# Patient Record
Sex: MALE | Race: WHITE | Employment: OTHER | URBAN - METROPOLITAN AREA
[De-identification: names, ages, dates, MRNs, and addresses within clinical notes are randomized per-mention and may not be internally consistent; named-entity substitution may affect disease eponyms.]

---

## 2018-03-26 ENCOUNTER — HOSPITAL ENCOUNTER (INPATIENT)
Age: 63
LOS: 2 days | Discharge: HOME | DRG: 432 | End: 2018-03-28
Attending: EMERGENCY MEDICINE | Admitting: INTERNAL MEDICINE
Payer: MEDICARE

## 2018-03-26 DIAGNOSIS — K29.21 GASTROINTESTINAL HEMORRHAGE ASSOCIATED WITH ALCOHOLIC GASTRITIS: Primary | ICD-10-CM

## 2018-03-26 DIAGNOSIS — Z87.19 HISTORY OF ESOPHAGEAL VARICES: ICD-10-CM

## 2018-03-26 PROBLEM — K92.2 GI BLEED: Status: ACTIVE | Noted: 2018-03-26

## 2018-03-26 LAB
ABO/RH: NORMAL
ALBUMIN SERPL-MCNC: 4.4 G/DL (ref 3.5–5.2)
ALP BLD-CCNC: 72 U/L (ref 40–129)
ALT SERPL-CCNC: 48 U/L (ref 0–40)
ANION GAP SERPL CALCULATED.3IONS-SCNC: 14 MMOL/L (ref 7–16)
ANTIBODY SCREEN: NORMAL
APTT: 32 SEC (ref 24.5–35.1)
AST SERPL-CCNC: 55 U/L (ref 0–39)
BASOPHILS ABSOLUTE: 0.02 E9/L (ref 0–0.2)
BASOPHILS RELATIVE PERCENT: 0.3 % (ref 0–2)
BILIRUB SERPL-MCNC: 1.6 MG/DL (ref 0–1.2)
BUN BLDV-MCNC: 35 MG/DL (ref 8–23)
CALCIUM SERPL-MCNC: 9.9 MG/DL (ref 8.6–10.2)
CHLORIDE BLD-SCNC: 101 MMOL/L (ref 98–107)
CO2: 24 MMOL/L (ref 22–29)
CREAT SERPL-MCNC: 0.8 MG/DL (ref 0.7–1.2)
EKG ATRIAL RATE: 124 BPM
EKG P AXIS: 68 DEGREES
EKG P-R INTERVAL: 144 MS
EKG Q-T INTERVAL: 306 MS
EKG QRS DURATION: 72 MS
EKG QTC CALCULATION (BAZETT): 439 MS
EKG R AXIS: 50 DEGREES
EKG T AXIS: 74 DEGREES
EKG VENTRICULAR RATE: 124 BPM
EOSINOPHILS ABSOLUTE: 0.02 E9/L (ref 0.05–0.5)
EOSINOPHILS RELATIVE PERCENT: 0.3 % (ref 0–6)
GFR AFRICAN AMERICAN: >60
GFR NON-AFRICAN AMERICAN: >60 ML/MIN/1.73
GLUCOSE BLD-MCNC: 104 MG/DL (ref 74–109)
HCT VFR BLD CALC: 39.6 % (ref 37–54)
HEMOGLOBIN: 13 G/DL (ref 12.5–16.5)
IMMATURE GRANULOCYTES #: 0.03 E9/L
IMMATURE GRANULOCYTES %: 0.4 % (ref 0–5)
INR BLD: 1.1
LACTIC ACID: 1.2 MMOL/L (ref 0.5–2.2)
LIPASE: 34 U/L (ref 13–60)
LYMPHOCYTES ABSOLUTE: 0.75 E9/L (ref 1.5–4)
LYMPHOCYTES RELATIVE PERCENT: 10.1 % (ref 20–42)
MCH RBC QN AUTO: 31.6 PG (ref 26–35)
MCHC RBC AUTO-ENTMCNC: 32.8 % (ref 32–34.5)
MCV RBC AUTO: 96.4 FL (ref 80–99.9)
MONOCYTES ABSOLUTE: 0.64 E9/L (ref 0.1–0.95)
MONOCYTES RELATIVE PERCENT: 8.6 % (ref 2–12)
NEUTROPHILS ABSOLUTE: 6 E9/L (ref 1.8–7.3)
NEUTROPHILS RELATIVE PERCENT: 80.3 % (ref 43–80)
PDW BLD-RTO: 14.2 FL (ref 11.5–15)
PLATELET # BLD: 147 E9/L (ref 130–450)
PMV BLD AUTO: 10.6 FL (ref 7–12)
POTASSIUM SERPL-SCNC: 4.2 MMOL/L (ref 3.5–5)
PRO-BNP: 128 PG/ML (ref 0–125)
PROTHROMBIN TIME: 13 SEC (ref 9.3–12.4)
RBC # BLD: 4.11 E12/L (ref 3.8–5.8)
SODIUM BLD-SCNC: 139 MMOL/L (ref 132–146)
TOTAL PROTEIN: 8.1 G/DL (ref 6.4–8.3)
TROPONIN: <0.01 NG/ML (ref 0–0.03)
WBC # BLD: 7.5 E9/L (ref 4.5–11.5)

## 2018-03-26 PROCEDURE — 80053 COMPREHEN METABOLIC PANEL: CPT

## 2018-03-26 PROCEDURE — 83690 ASSAY OF LIPASE: CPT

## 2018-03-26 PROCEDURE — 85025 COMPLETE CBC W/AUTO DIFF WBC: CPT

## 2018-03-26 PROCEDURE — C9113 INJ PANTOPRAZOLE SODIUM, VIA: HCPCS | Performed by: INTERNAL MEDICINE

## 2018-03-26 PROCEDURE — 2580000003 HC RX 258: Performed by: INTERNAL MEDICINE

## 2018-03-26 PROCEDURE — 84484 ASSAY OF TROPONIN QUANT: CPT

## 2018-03-26 PROCEDURE — 83880 ASSAY OF NATRIURETIC PEPTIDE: CPT

## 2018-03-26 PROCEDURE — 86901 BLOOD TYPING SEROLOGIC RH(D): CPT

## 2018-03-26 PROCEDURE — 93005 ELECTROCARDIOGRAM TRACING: CPT | Performed by: PHYSICIAN ASSISTANT

## 2018-03-26 PROCEDURE — 6360000002 HC RX W HCPCS: Performed by: EMERGENCY MEDICINE

## 2018-03-26 PROCEDURE — 6360000002 HC RX W HCPCS: Performed by: INTERNAL MEDICINE

## 2018-03-26 PROCEDURE — 2580000003 HC RX 258: Performed by: EMERGENCY MEDICINE

## 2018-03-26 PROCEDURE — 86850 RBC ANTIBODY SCREEN: CPT

## 2018-03-26 PROCEDURE — 99285 EMERGENCY DEPT VISIT HI MDM: CPT

## 2018-03-26 PROCEDURE — 2060000000 HC ICU INTERMEDIATE R&B

## 2018-03-26 PROCEDURE — 85730 THROMBOPLASTIN TIME PARTIAL: CPT

## 2018-03-26 PROCEDURE — C9113 INJ PANTOPRAZOLE SODIUM, VIA: HCPCS | Performed by: EMERGENCY MEDICINE

## 2018-03-26 PROCEDURE — 83605 ASSAY OF LACTIC ACID: CPT

## 2018-03-26 PROCEDURE — 86900 BLOOD TYPING SEROLOGIC ABO: CPT

## 2018-03-26 PROCEDURE — 2500000003 HC RX 250 WO HCPCS: Performed by: INTERNAL MEDICINE

## 2018-03-26 PROCEDURE — 85610 PROTHROMBIN TIME: CPT

## 2018-03-26 PROCEDURE — 96375 TX/PRO/DX INJ NEW DRUG ADDON: CPT

## 2018-03-26 PROCEDURE — 96374 THER/PROPH/DIAG INJ IV PUSH: CPT

## 2018-03-26 RX ORDER — LORAZEPAM 2 MG/ML
4 INJECTION INTRAMUSCULAR
Status: DISCONTINUED | OUTPATIENT
Start: 2018-03-26 | End: 2018-03-28 | Stop reason: HOSPADM

## 2018-03-26 RX ORDER — ONDANSETRON 2 MG/ML
4 INJECTION INTRAMUSCULAR; INTRAVENOUS ONCE
Status: COMPLETED | OUTPATIENT
Start: 2018-03-26 | End: 2018-03-26

## 2018-03-26 RX ORDER — NADOLOL 20 MG/1
40 TABLET ORAL DAILY
Status: DISCONTINUED | OUTPATIENT
Start: 2018-03-26 | End: 2018-03-28 | Stop reason: HOSPADM

## 2018-03-26 RX ORDER — LORAZEPAM 2 MG/ML
1 INJECTION INTRAMUSCULAR
Status: DISCONTINUED | OUTPATIENT
Start: 2018-03-26 | End: 2018-03-28 | Stop reason: HOSPADM

## 2018-03-26 RX ORDER — LORAZEPAM 1 MG/1
3 TABLET ORAL
Status: DISCONTINUED | OUTPATIENT
Start: 2018-03-26 | End: 2018-03-28 | Stop reason: HOSPADM

## 2018-03-26 RX ORDER — SODIUM CHLORIDE 0.9 % (FLUSH) 0.9 %
10 SYRINGE (ML) INJECTION PRN
Status: DISCONTINUED | OUTPATIENT
Start: 2018-03-26 | End: 2018-03-26 | Stop reason: SDUPTHER

## 2018-03-26 RX ORDER — LORAZEPAM 1 MG/1
1 TABLET ORAL
Status: DISCONTINUED | OUTPATIENT
Start: 2018-03-26 | End: 2018-03-28 | Stop reason: HOSPADM

## 2018-03-26 RX ORDER — LORAZEPAM 2 MG/ML
2 INJECTION INTRAMUSCULAR
Status: DISCONTINUED | OUTPATIENT
Start: 2018-03-26 | End: 2018-03-28 | Stop reason: HOSPADM

## 2018-03-26 RX ORDER — SODIUM CHLORIDE 0.9 % (FLUSH) 0.9 %
10 SYRINGE (ML) INJECTION EVERY 12 HOURS SCHEDULED
Status: DISCONTINUED | OUTPATIENT
Start: 2018-03-26 | End: 2018-03-28 | Stop reason: HOSPADM

## 2018-03-26 RX ORDER — SODIUM CHLORIDE 0.9 % (FLUSH) 0.9 %
10 SYRINGE (ML) INJECTION PRN
Status: DISCONTINUED | OUTPATIENT
Start: 2018-03-26 | End: 2018-03-28 | Stop reason: HOSPADM

## 2018-03-26 RX ORDER — LORAZEPAM 1 MG/1
4 TABLET ORAL
Status: DISCONTINUED | OUTPATIENT
Start: 2018-03-26 | End: 2018-03-28 | Stop reason: HOSPADM

## 2018-03-26 RX ORDER — SODIUM CHLORIDE 0.9 % (FLUSH) 0.9 %
10 SYRINGE (ML) INJECTION EVERY 12 HOURS SCHEDULED
Status: DISCONTINUED | OUTPATIENT
Start: 2018-03-26 | End: 2018-03-26 | Stop reason: SDUPTHER

## 2018-03-26 RX ORDER — LORAZEPAM 2 MG/ML
3 INJECTION INTRAMUSCULAR
Status: DISCONTINUED | OUTPATIENT
Start: 2018-03-26 | End: 2018-03-28 | Stop reason: HOSPADM

## 2018-03-26 RX ORDER — SUCRALFATE 1 G/1
1 TABLET ORAL
Status: DISCONTINUED | OUTPATIENT
Start: 2018-03-26 | End: 2018-03-26

## 2018-03-26 RX ORDER — ACETAMINOPHEN 325 MG/1
650 TABLET ORAL EVERY 4 HOURS PRN
Status: DISCONTINUED | OUTPATIENT
Start: 2018-03-26 | End: 2018-03-28 | Stop reason: HOSPADM

## 2018-03-26 RX ORDER — LORAZEPAM 1 MG/1
2 TABLET ORAL
Status: DISCONTINUED | OUTPATIENT
Start: 2018-03-26 | End: 2018-03-28 | Stop reason: HOSPADM

## 2018-03-26 RX ORDER — NADOLOL 40 MG/1
40 TABLET ORAL DAILY
Status: DISCONTINUED | OUTPATIENT
Start: 2018-03-26 | End: 2018-03-26 | Stop reason: SDUPTHER

## 2018-03-26 RX ORDER — 0.9 % SODIUM CHLORIDE 0.9 %
1000 INTRAVENOUS SOLUTION INTRAVENOUS ONCE
Status: COMPLETED | OUTPATIENT
Start: 2018-03-26 | End: 2018-03-26

## 2018-03-26 RX ORDER — ACETAMINOPHEN 325 MG/1
650 TABLET ORAL EVERY 4 HOURS PRN
Status: DISCONTINUED | OUTPATIENT
Start: 2018-03-26 | End: 2018-03-26 | Stop reason: SDUPTHER

## 2018-03-26 RX ORDER — ONDANSETRON 2 MG/ML
4 INJECTION INTRAMUSCULAR; INTRAVENOUS EVERY 6 HOURS PRN
Status: DISCONTINUED | OUTPATIENT
Start: 2018-03-26 | End: 2018-03-28 | Stop reason: HOSPADM

## 2018-03-26 RX ORDER — SODIUM CHLORIDE 9 MG/ML
INJECTION, SOLUTION INTRAVENOUS CONTINUOUS
Status: DISCONTINUED | OUTPATIENT
Start: 2018-03-26 | End: 2018-03-27

## 2018-03-26 RX ORDER — PANTOPRAZOLE SODIUM 40 MG/10ML
40 INJECTION, POWDER, LYOPHILIZED, FOR SOLUTION INTRAVENOUS ONCE
Status: COMPLETED | OUTPATIENT
Start: 2018-03-26 | End: 2018-03-26

## 2018-03-26 RX ADMIN — PANTOPRAZOLE SODIUM 40 MG: 40 INJECTION, POWDER, FOR SOLUTION INTRAVENOUS at 16:58

## 2018-03-26 RX ADMIN — SODIUM CHLORIDE 8 MG/HR: 9 INJECTION, SOLUTION INTRAVENOUS at 20:46

## 2018-03-26 RX ADMIN — OCTREOTIDE ACETATE 50 MCG/HR: 500 INJECTION, SOLUTION INTRAVENOUS; SUBCUTANEOUS at 20:47

## 2018-03-26 RX ADMIN — Medication 10 ML: at 21:21

## 2018-03-26 RX ADMIN — ONDANSETRON 4 MG: 2 INJECTION INTRAMUSCULAR; INTRAVENOUS at 16:55

## 2018-03-26 RX ADMIN — Medication 10 ML: at 21:22

## 2018-03-26 RX ADMIN — FOLIC ACID: 5 INJECTION, SOLUTION INTRAMUSCULAR; INTRAVENOUS; SUBCUTANEOUS at 21:21

## 2018-03-26 RX ADMIN — SODIUM CHLORIDE 1000 ML: 9 INJECTION, SOLUTION INTRAVENOUS at 16:54

## 2018-03-26 RX ADMIN — SODIUM CHLORIDE: 9 INJECTION, SOLUTION INTRAVENOUS at 20:47

## 2018-03-26 ASSESSMENT — PAIN SCALES - GENERAL
PAINLEVEL_OUTOF10: 0
PAINLEVEL_OUTOF10: 0

## 2018-03-26 NOTE — ED NOTES
Pt taken back to room 18, back called regarding patient status and high heart rate/diaphoretic/clammy     Rosibel Dalton RN  03/26/18 3619

## 2018-03-26 NOTE — ED PROVIDER NOTES
Department of Emergency Medicine   ED  Provider Note  Admit Date/RoomTime: 3/26/2018  3:00 PM  ED Room: 18/18      History of Present Illness:  3/26/18, Time: 4:50 PM        Tran Kamara is a 61 y.o. male presenting to the ED for hematemesis, beginning around 1:30 am today. The complaint has been persistent, moderate in severity, and worsened by nothing. Pt reports that he woke up with hematemesis and melena. Reports that he has a hx of esophageal varices and had them banded 1 year ago. Reports that his varices were due to heavy drinking. Reports that he had stopped drinking alcohol after procedure but started again in the past 2 months. Reports that he follows up with Dr. Angelica Calle for GI. Reports that he was previously on Prilosec and Carafate while he was incarcerated but since he was released, he has not been taking any kind of acid blocker. Denies chest pain, shortness of breath, fever, chills, abdominal pain, constipation, and further complaints at this time. Review of Systems:   Pertinent positives and negatives are stated within HPI, all other systems reviewed and are negative.    --------------------------------------------- PAST HISTORY ---------------------------------------------  Past Medical History:  has a past medical history of Cirrhosis ([facility] [address] Esophageal varices ([facility] GI bleed; and Hepatitis C. Past Surgical History:  has a past surgical history that includes Leg Surgery; Endoscopy, colon, diagnostic (4/30/2013); and Upper gastrointestinal endoscopy (09/02/2016). Social History:  reports that he has never smoked. He has never used smokeless tobacco. He reports that he drinks alcohol. He reports that he does not use drugs. Family History: family history is not on file. The patients home medications have been reviewed. Allergies: Patient has no known allergies.     ---------------------------------------------------PHYSICAL EXAM--------------------------------------    (Vital signs reviewed)  Constitutional: He appears well-developed and well-nourished. No acute distress. Head: Normocephalic and atraumatic. Eyes: Conjunctivae are normal. PERRL. HENT: Mucous membranes are moist.  Neck: Supple. Cardiovascular: Regular rate. Regular rhythm. No murmurs, gallops, or rubs. 2+ distal pulses. Distal pulses intact. Pulmonary/Chest: Lungs clear to auscultation bilaterally, no wheezes, rales, or rhonchi. Not in respiratory distress  Abdominal: Abdomen Soft, Non tender, Non distended. +BS. No rebound, guarding, or rigidity. No pulsatile masses  Rectal:  No tenderness. No masses. No hemorrhoids. Normal sphincter tone. Stool is dark, no definite melena; guaiac positive. Musculoskeletal: No edema. Skin: Warm and dry. Neurological: He is alert and oriented x3. CN II-XII intact. No focal deficits. No meningeal signs. Psychiatric: Normal affect. No signs or symptoms of depression or psychosis.     -------------------------------------------------- RESULTS -------------------------------------------------  I have personally reviewed all laboratory and imaging results for this patient. Results are listed below.      LABS:  Results for orders placed or performed during the hospital encounter of 03/26/18   CBC Auto Differential   Result Value Ref Range    WBC 7.5 4.5 - 11.5 E9/L    RBC 4.11 3.80 - 5.80 E12/L    Hemoglobin 13.0 12.5 - 16.5 g/dL    Hematocrit 39.6 37.0 - 54.0 %    MCV 96.4 80.0 - 99.9 fL    MCH 31.6 26.0 - 35.0 pg    MCHC 32.8 32.0 - 34.5 %    RDW 14.2 11.5 - 15.0 fL    Platelets 322 541 - 740 E9/L    MPV 10.6 7.0 - 12.0 fL    Neutrophils % 80.3 (H) 43.0 - 80.0 %    Immature Granulocytes % 0.4 0.0 - 5.0 %    Lymphocytes % 10.1 (L) 20.0 - 42.0 %    Monocytes % 8.6 2.0 - 12.0 %    Eosinophils % 0.3 0.0 - 6.0 %    Basophils % 0.3 0.0 - 2.0 %    Neutrophils # 6.00 1.80 - 7.30 E9/L    Immature Granulocytes # 0.03 E9/L    Lymphocytes # 0.75 (L) 1.50 - 4.00 E9/L    Monocytes # reviewed by me in its entirety. I confirm that the note above accurately reflects all work, treatment, procedures, and medical decision making performed by me.                  Alex Ross MD  03/26/18 6564

## 2018-03-26 NOTE — H&P
[facility] Hospitalist Group History and Physical      CHIEF COMPLAINT:  Hematemesis, melana    History of Present Illness: This is a 61year old with a history of esophageal varices and previous banding in November of last year. He stated sometime yesterday he began to vomit bright red blood and he noticed dark stools at the same time. He has not been able to eat anything since. Early this morning he went 5 times to the bathroom within 1 hour. He admitted to starting alcohol again and he drinks now 8 beers a day from last November. He denies any other complaints. No other aggravating or alleviating factors. REVIEW OF SYSTEMS:  no fevers, chills, cp, sob, ha, vision/hearing changes, wt changes, hot/cold flashes, other open skin lesions, diarrhea, constipation, dysuria/hematuria unless noted in HPI. Complete ROS performed with the patient and is otherwise negative. PMH:  Past Medical History:   Diagnosis Date    Cirrhosis ([address]     Esophageal varices (HCC)     GI bleed     UPPER- 15 MONTHS AGO    Hepatitis C        Surgical History:  Past Surgical History:   Procedure Laterality Date    ENDOSCOPY, COLON, DIAGNOSTIC  4/30/2013    LEG SURGERY      right leg    UPPER GASTROINTESTINAL ENDOSCOPY  09/02/2016    Nevarez, nonbleeding esophageal varicies and gastritis       Medications Prior to Admission:    Prior to Admission medications    Medication Sig Start Date End Date Taking?  Authorizing Provider   pantoprazole (PROTONIX) 40 MG tablet Take 1 tablet by mouth daily  Patient taking differently: Take 40 mg by mouth daily as needed  10/19/16  Yes Ashlee Vasquez MD   nadolol (CORGARD) 40 MG tablet Take 1 tablet by mouth daily 10/19/16   Ashlee Vasquez MD   ferrous sulfate (FE TABS) 325 (65 FE) MG EC tablet Take 1 tablet by mouth 2 times daily 10/19/16   Ashlee Vasquez MD   sucralfate (CARAFATE) 1 GM tablet Take 1 tablet by mouth 4 times daily 10/19/16   Ashlee Vasquez MD Allergies:    Patient has no known allergies. Social History:    reports that he has never smoked. He has never used smokeless tobacco. He reports that he drinks alcohol. He reports that he does not use drugs. Family History:   family history is not on file. PHYSICAL EXAM:  Vitals:  BP (!) 141/89   Pulse 114   Temp 98.4 °F (36.9 °C)   Resp 20   Ht 5' 9\" (1.753 m)   Wt 189 lb (85.7 kg)   SpO2 98%   BMI 27.91 kg/m²   General Appearance: alert and oriented to person, place and time  Skin: warm and dry, no rash or erythema  Head: normocephalic and atraumatic  Eyes: extraocular eye movements intact  Neck: neck supple and non tender without mass, no thyromegaly or thyroid nodules, no cervical lymphadenopathy   Pulmonary/Chest: clear to auscultation bilaterally- no wheezes, rales or rhonchi, normal air movement, no respiratory distress  Cardiovascular: normal rate and normal S1 and S2  Abdomen: soft, non-tender, non-distended, normal bowel sounds, no masses or organomegaly  Extremities: no cyanosis and no clubbing  Musculoskeletal: normal range of motion, no joint swelling, deformity or tenderness      LABS:  Recent Labs      03/26/18   1516   NA  139   K  4.2   CL  101   CO2  24   BUN  35*   CREATININE  0.8   GLUCOSE  104   CALCIUM  9.9       Recent Labs      03/26/18   1516   WBC  7.5   RBC  4.11   HGB  13.0   HCT  39.6   MCV  96.4   MCH  31.6   MCHC  32.8   RDW  14.2   PLT  147   MPV  10.6       No results for input(s): POCGLU in the last 72 hours.     CBC:   Lab Results   Component Value Date    WBC 7.5 03/26/2018    RBC 4.11 03/26/2018    RBC  09/01/2016      RBC           [id number]    transfused   09/02/16  00:19  SCC    RBC  09/01/2016      RBC           [id number]    transfused   09/02/16  19:47  SCC    HGB 13.0 03/26/2018    HCT 39.6 03/26/2018    MCV 96.4 03/26/2018    MCH 31.6 03/26/2018    MCHC 32.8 03/26/2018    RDW 14.2 03/26/2018     03/26/2018    MPV 10.6

## 2018-03-26 NOTE — LETTER
[facility] Emergency Department  [address] [place] [address]  Phone: [phone]               March 26, 2018    Patient: Irma Herndon   YOB: 1955   Date of Visit: 3/26/2018       To Whom It May Concern:    Irma Herndon was seen in in our emergency department and admitted on 3/26/2018 to [facility].  Laila Orellana [address], [place]    [facility]  C:[phone]           Signature:__________________________________

## 2018-03-26 NOTE — CARE COORDINATION
Social Work/Transition of Care:    MATILDE met with pt who reported he is to be in court tomorrow and he is going to be admitted to the hospital, pt requested a letter be faxed to his  stating he was admitted. Per pt request SW faxed a letter of his admission to  [facility] at [phone]. MATILDE gave pt a copy of the faxed letter along with his confirmation.     Electronically signed by Holley Lawler on 2/61/9520 at 6:43 PM

## 2018-03-27 ENCOUNTER — ANESTHESIA (OUTPATIENT)
Dept: ENDOSCOPY | Age: 63
DRG: 432 | End: 2018-03-27
Payer: MEDICARE

## 2018-03-27 ENCOUNTER — ANESTHESIA EVENT (OUTPATIENT)
Dept: ENDOSCOPY | Age: 63
DRG: 432 | End: 2018-03-27
Payer: MEDICARE

## 2018-03-27 VITALS
DIASTOLIC BLOOD PRESSURE: 54 MMHG | OXYGEN SATURATION: 99 % | SYSTOLIC BLOOD PRESSURE: 82 MMHG | RESPIRATION RATE: 27 BRPM

## 2018-03-27 PROBLEM — I85.00 ESOPHAGEAL VARICES (HCC): Status: ACTIVE | Noted: 2018-03-27

## 2018-03-27 LAB
ALBUMIN SERPL-MCNC: 3.6 G/DL (ref 3.5–5.2)
ALP BLD-CCNC: 52 U/L (ref 40–129)
ALT SERPL-CCNC: 52 U/L (ref 0–40)
AMMONIA: 46.7 UMOL/L (ref 16–60)
ANION GAP SERPL CALCULATED.3IONS-SCNC: 11 MMOL/L (ref 7–16)
AST SERPL-CCNC: 69 U/L (ref 0–39)
BASOPHILS ABSOLUTE: 0.01 E9/L (ref 0–0.2)
BASOPHILS RELATIVE PERCENT: 0.3 % (ref 0–2)
BILIRUB SERPL-MCNC: 2.5 MG/DL (ref 0–1.2)
BUN BLDV-MCNC: 31 MG/DL (ref 8–23)
CALCIUM SERPL-MCNC: 9 MG/DL (ref 8.6–10.2)
CHLORIDE BLD-SCNC: 107 MMOL/L (ref 98–107)
CO2: 22 MMOL/L (ref 22–29)
CREAT SERPL-MCNC: 0.9 MG/DL (ref 0.7–1.2)
EOSINOPHILS ABSOLUTE: 0.02 E9/L (ref 0.05–0.5)
EOSINOPHILS RELATIVE PERCENT: 0.7 % (ref 0–6)
GFR AFRICAN AMERICAN: >60
GFR NON-AFRICAN AMERICAN: >60 ML/MIN/1.73
GLUCOSE BLD-MCNC: 118 MG/DL (ref 74–109)
HCT VFR BLD CALC: 28.1 % (ref 37–54)
HCT VFR BLD CALC: 30.7 % (ref 37–54)
HCT VFR BLD CALC: 30.9 % (ref 37–54)
HCT VFR BLD CALC: 31.1 % (ref 37–54)
HEMOGLOBIN: 10 G/DL (ref 12.5–16.5)
HEMOGLOBIN: 9.1 G/DL (ref 12.5–16.5)
HEMOGLOBIN: 9.8 G/DL (ref 12.5–16.5)
HEMOGLOBIN: 9.9 G/DL (ref 12.5–16.5)
IMMATURE GRANULOCYTES #: 0.01 E9/L
IMMATURE GRANULOCYTES %: 0.3 % (ref 0–5)
LYMPHOCYTES ABSOLUTE: 0.63 E9/L (ref 1.5–4)
LYMPHOCYTES RELATIVE PERCENT: 21 % (ref 20–42)
MAGNESIUM: 1.8 MG/DL (ref 1.6–2.6)
MCH RBC QN AUTO: 31.8 PG (ref 26–35)
MCHC RBC AUTO-ENTMCNC: 32 % (ref 32–34.5)
MCV RBC AUTO: 99.4 FL (ref 80–99.9)
MONOCYTES ABSOLUTE: 0.28 E9/L (ref 0.1–0.95)
MONOCYTES RELATIVE PERCENT: 9.3 % (ref 2–12)
NEUTROPHILS ABSOLUTE: 2.05 E9/L (ref 1.8–7.3)
NEUTROPHILS RELATIVE PERCENT: 68.4 % (ref 43–80)
PDW BLD-RTO: 14.2 FL (ref 11.5–15)
PLATELET # BLD: 78 E9/L (ref 130–450)
PLATELET CONFIRMATION: NORMAL
PMV BLD AUTO: 11 FL (ref 7–12)
POTASSIUM REFLEX MAGNESIUM: 4.1 MMOL/L (ref 3.5–5)
RBC # BLD: 3.11 E12/L (ref 3.8–5.8)
SODIUM BLD-SCNC: 140 MMOL/L (ref 132–146)
TOTAL PROTEIN: 6.6 G/DL (ref 6.4–8.3)
WBC # BLD: 3 E9/L (ref 4.5–11.5)

## 2018-03-27 PROCEDURE — 85018 HEMOGLOBIN: CPT

## 2018-03-27 PROCEDURE — 6360000002 HC RX W HCPCS: Performed by: NURSE ANESTHETIST, CERTIFIED REGISTERED

## 2018-03-27 PROCEDURE — 36415 COLL VENOUS BLD VENIPUNCTURE: CPT

## 2018-03-27 PROCEDURE — 2580000003 HC RX 258: Performed by: INTERNAL MEDICINE

## 2018-03-27 PROCEDURE — 85025 COMPLETE CBC W/AUTO DIFF WBC: CPT

## 2018-03-27 PROCEDURE — 80053 COMPREHEN METABOLIC PANEL: CPT

## 2018-03-27 PROCEDURE — 7100000011 HC PHASE II RECOVERY - ADDTL 15 MIN: Performed by: INTERNAL MEDICINE

## 2018-03-27 PROCEDURE — 6370000000 HC RX 637 (ALT 250 FOR IP): Performed by: INTERNAL MEDICINE

## 2018-03-27 PROCEDURE — 2500000003 HC RX 250 WO HCPCS: Performed by: INTERNAL MEDICINE

## 2018-03-27 PROCEDURE — 2500000003 HC RX 250 WO HCPCS: Performed by: NURSE ANESTHETIST, CERTIFIED REGISTERED

## 2018-03-27 PROCEDURE — 06L38CZ OCCLUSION OF ESOPHAGEAL VEIN WITH EXTRALUMINAL DEVICE, VIA NATURAL OR ARTIFICIAL OPENING ENDOSCOPIC: ICD-10-PCS | Performed by: INTERNAL MEDICINE

## 2018-03-27 PROCEDURE — 2580000003 HC RX 258: Performed by: NURSE ANESTHETIST, CERTIFIED REGISTERED

## 2018-03-27 PROCEDURE — 6370000000 HC RX 637 (ALT 250 FOR IP): Performed by: NURSE PRACTITIONER

## 2018-03-27 PROCEDURE — 6360000002 HC RX W HCPCS: Performed by: INTERNAL MEDICINE

## 2018-03-27 PROCEDURE — 2720000010 HC SURG SUPPLY STERILE: Performed by: INTERNAL MEDICINE

## 2018-03-27 PROCEDURE — 2060000000 HC ICU INTERMEDIATE R&B

## 2018-03-27 PROCEDURE — 83735 ASSAY OF MAGNESIUM: CPT

## 2018-03-27 PROCEDURE — 82140 ASSAY OF AMMONIA: CPT

## 2018-03-27 PROCEDURE — 3700000001 HC ADD 15 MINUTES (ANESTHESIA): Performed by: INTERNAL MEDICINE

## 2018-03-27 PROCEDURE — 3700000000 HC ANESTHESIA ATTENDED CARE: Performed by: INTERNAL MEDICINE

## 2018-03-27 PROCEDURE — 7100000010 HC PHASE II RECOVERY - FIRST 15 MIN: Performed by: INTERNAL MEDICINE

## 2018-03-27 PROCEDURE — 3609012300 HC EGD BAND LIGATION ESOPHGEAL/GASTRIC VARICES: Performed by: INTERNAL MEDICINE

## 2018-03-27 PROCEDURE — 85014 HEMATOCRIT: CPT

## 2018-03-27 PROCEDURE — C9113 INJ PANTOPRAZOLE SODIUM, VIA: HCPCS | Performed by: INTERNAL MEDICINE

## 2018-03-27 RX ORDER — SODIUM CHLORIDE 9 MG/ML
INJECTION, SOLUTION INTRAVENOUS CONTINUOUS PRN
Status: DISCONTINUED | OUTPATIENT
Start: 2018-03-27 | End: 2018-03-27 | Stop reason: SDUPTHER

## 2018-03-27 RX ORDER — FENTANYL CITRATE 50 UG/ML
INJECTION, SOLUTION INTRAMUSCULAR; INTRAVENOUS PRN
Status: DISCONTINUED | OUTPATIENT
Start: 2018-03-27 | End: 2018-03-27 | Stop reason: SDUPTHER

## 2018-03-27 RX ORDER — EPHEDRINE SULFATE 50 MG/ML
INJECTION INTRAVENOUS PRN
Status: DISCONTINUED | OUTPATIENT
Start: 2018-03-27 | End: 2018-03-27 | Stop reason: SDUPTHER

## 2018-03-27 RX ORDER — PANTOPRAZOLE SODIUM 40 MG/1
40 TABLET, DELAYED RELEASE ORAL
Status: DISCONTINUED | OUTPATIENT
Start: 2018-03-27 | End: 2018-03-27

## 2018-03-27 RX ORDER — PROPOFOL 10 MG/ML
INJECTION, EMULSION INTRAVENOUS PRN
Status: DISCONTINUED | OUTPATIENT
Start: 2018-03-27 | End: 2018-03-27 | Stop reason: SDUPTHER

## 2018-03-27 RX ORDER — LACTULOSE 10 G/15ML
20 SOLUTION ORAL 3 TIMES DAILY
Status: DISCONTINUED | OUTPATIENT
Start: 2018-03-27 | End: 2018-03-27

## 2018-03-27 RX ADMIN — SODIUM CHLORIDE: 9 INJECTION, SOLUTION INTRAVENOUS at 11:33

## 2018-03-27 RX ADMIN — Medication 10 ML: at 20:43

## 2018-03-27 RX ADMIN — FENTANYL CITRATE 100 MCG: 50 INJECTION, SOLUTION INTRAMUSCULAR; INTRAVENOUS at 11:31

## 2018-03-27 RX ADMIN — FENTANYL CITRATE 50 MCG: 50 INJECTION, SOLUTION INTRAMUSCULAR; INTRAVENOUS at 11:40

## 2018-03-27 RX ADMIN — LACTULOSE 20 G: 10 SOLUTION ORAL at 16:35

## 2018-03-27 RX ADMIN — PANTOPRAZOLE SODIUM 40 MG: 40 TABLET, DELAYED RELEASE ORAL at 15:52

## 2018-03-27 RX ADMIN — PROPOFOL 200 MG: 10 INJECTION, EMULSION INTRAVENOUS at 11:40

## 2018-03-27 RX ADMIN — NADOLOL 40 MG: 20 TABLET ORAL at 08:54

## 2018-03-27 RX ADMIN — FOLIC ACID: 5 INJECTION, SOLUTION INTRAMUSCULAR; INTRAVENOUS; SUBCUTANEOUS at 08:54

## 2018-03-27 RX ADMIN — EPHEDRINE SULFATE 15 MG: 50 INJECTION, SOLUTION INTRAVENOUS at 11:45

## 2018-03-27 RX ADMIN — OCTREOTIDE ACETATE 50 MCG/HR: 500 INJECTION, SOLUTION INTRAVENOUS; SUBCUTANEOUS at 06:39

## 2018-03-27 RX ADMIN — SODIUM CHLORIDE 8 MG/HR: 9 INJECTION, SOLUTION INTRAVENOUS at 06:39

## 2018-03-27 RX ADMIN — FENTANYL CITRATE 50 MCG: 50 INJECTION, SOLUTION INTRAMUSCULAR; INTRAVENOUS at 11:52

## 2018-03-27 ASSESSMENT — PAIN SCALES - GENERAL
PAINLEVEL_OUTOF10: 0

## 2018-03-27 ASSESSMENT — PAIN DESCRIPTION - LOCATION
LOCATION: ABDOMEN

## 2018-03-27 NOTE — CONSULTS
Dr. Francois Becca April 2016 at [facility], for esophageal banding. Patient lost to follow up post procedure. Patient reports to incarceration during this time. EGD with Dr. Terri Melton 9/2/16: Esophageal varices - non bleeding, Gastritis. Stomach biopsy- negative.  EGD with Dr. Jade Alcocer 10/17/16: Distal esophageal varices, 2 bands applied, Unremarkable stomach and duodenum    Past Medical History:        Diagnosis Date    Cirrhosis ([address]     Esophageal varices (HCC)     GI bleed     UPPER- 15 MONTHS AGO    Hepatitis C      Past Surgical History:        Procedure Laterality Date    ENDOSCOPY, COLON, DIAGNOSTIC  4/30/2013    LEG SURGERY      right leg    UPPER GASTROINTESTINAL ENDOSCOPY  09/02/2016    Mary Sprung, nonbleeding esophageal varicies and gastritis     Current Medications:    Current Facility-Administered Medications: pantoprazole (PROTONIX) 80 mg in sodium chloride 0.9 % 100 mL infusion, 8 mg/hr, Intravenous, Continuous  sodium chloride flush 0.9 % injection 10 mL, 10 mL, Intravenous, 2 times per day  sodium chloride flush 0.9 % injection 10 mL, 10 mL, Intravenous, PRN  acetaminophen (TYLENOL) tablet 650 mg, 650 mg, Oral, Q4H PRN  magnesium hydroxide (MILK OF MAGNESIA) 400 MG/5ML suspension 30 mL, 30 mL, Oral, Daily PRN  ondansetron (ZOFRAN) injection 4 mg, 4 mg, Intravenous, Q6H PRN  0.9 % sodium chloride infusion, , Intravenous, Continuous  nadolol (CORGARD) tablet 40 mg, 40 mg, Oral, Daily  octreotide (SANDOSTATIN) 500 mcg in sodium chloride 0.9 % 100 mL infusion, 50 mcg/hr, Intravenous, Continuous  sodium chloride flush 0.9 % injection 10 mL, 10 mL, Intravenous, 2 times per day  sodium chloride flush 0.9 % injection 10 mL, 10 mL, Intravenous, PRN  sodium chloride 0.9 % 7,842 mL with folic acid 1 mg, Multi-Vitamin with vitamin k 10 mL, thiamine 100 mg, , Intravenous, Daily  LORazepam (ATIVAN) tablet 1 mg, 1 mg, Oral, Q1H PRN **OR** LORazepam (ATIVAN) injection 1 mg, 1 mg, Intravenous, Q1H PRN **OR** LORazepam

## 2018-03-27 NOTE — ANESTHESIA PRE PROCEDURE
03/27/18 0854    octreotide (SANDOSTATIN) 500 mcg in sodium chloride 0.9 % 100 mL infusion  50 mcg/hr Intravenous Continuous Cherise Calhoun MD 10 mL/hr at 03/27/18 0639 50 mcg/hr at 03/27/18 0639    sodium chloride flush 0.9 % injection 10 mL  10 mL Intravenous 2 times per day Daniela Diaz MD   10 mL at 03/26/18 2121    sodium chloride flush 0.9 % injection 10 mL  10 mL Intravenous PRN Daniela Diaz MD        sodium chloride 0.9 % 7,429 mL with folic acid 1 mg, Multi-Vitamin with vitamin k 10 mL, thiamine 100 mg   Intravenous Daily Daniela Diaz  mL/hr at 03/27/18 0854      LORazepam (ATIVAN) tablet 1 mg  1 mg Oral Q1H PRN Daniela Diaz MD        Or    LORazepam (ATIVAN) injection 1 mg  1 mg Intravenous Q1H PRN Daniela Diaz MD        Or    LORazepam (ATIVAN) tablet 2 mg  2 mg Oral Q1H PRN Daniela Diaz MD        Or    LORazepam (ATIVAN) injection 2 mg  2 mg Intravenous Q1H PRN Daniela Diaz MD        Or    LORazepam (ATIVAN) tablet 3 mg  3 mg Oral Q1H PRN Daniela Diaz MD        Or    LORazepam (ATIVAN) injection 3 mg  3 mg Intravenous Q1H PRN Daniela Diaz MD        Or    LORazepam (ATIVAN) tablet 4 mg  4 mg Oral Q1H PRN Daniela Diaz MD        Or    LORazepam (ATIVAN) injection 4 mg  4 mg Intravenous Q1H PRN Daniela Diaz MD           Allergies:  No Known Allergies    Problem List:    Patient Active Problem List   Diagnosis Code    Hematemesis with nausea K92.0    Acute blood loss anemia D62    Alcohol abuse F10.10    Gastrointestinal hemorrhage with hematemesis K92.0    Chronic superficial gastritis without bleeding K29.30    Secondary esophageal varices with bleeding (HCC) I85.11    GI bleed K92.2       Past Medical History:        Diagnosis Date    Cirrhosis ([facility] [address]     Esophageal varices (HCC)     GI bleed     UPPER- 15 MONTHS AGO    Hepatitis C        Past Surgical History:        Procedure Laterality

## 2018-03-27 NOTE — BRIEF OP NOTE
Brief Postoperative Note    Airam Escoto  YOB: 1955  [id number]    Pre-operative Diagnosis: GI bleeding, hx varices and liver disease    Post-operative Diagnosis: nonbleeding varices, normal stomach and duodenum    Procedure: EGD plus banding x 3    Anesthesia: General    Surgeons/Assistants: halima    Estimated Blood Loss: 0    Complications: None    Specimens: Was Not Obtained    Findings: see above    Electronically signed by Keny Huff MD on 3/27/2018 at 11:53 AM

## 2018-03-27 NOTE — PROGRESS NOTES
Nursing Transfer Note    Data:  Summary of patients progress: stable  Reason for transfer: inpatient    Action:  Explained reason for transfer to Patient and Family. Report given to: Emani Rojas on [address] [place] [address], using [employer].   Mode of transportation: cart    Response:  RN Recommendations:

## 2018-03-28 VITALS
DIASTOLIC BLOOD PRESSURE: 52 MMHG | SYSTOLIC BLOOD PRESSURE: 99 MMHG | HEART RATE: 87 BPM | WEIGHT: 171.8 LBS | TEMPERATURE: 98.2 F | OXYGEN SATURATION: 97 % | HEIGHT: 69 IN | BODY MASS INDEX: 25.45 KG/M2 | RESPIRATION RATE: 18 BRPM

## 2018-03-28 LAB
ALBUMIN SERPL-MCNC: 3.5 G/DL (ref 3.5–5.2)
ALP BLD-CCNC: 47 U/L (ref 40–129)
ALT SERPL-CCNC: 40 U/L (ref 0–40)
AST SERPL-CCNC: 51 U/L (ref 0–39)
BILIRUB SERPL-MCNC: 1.2 MG/DL (ref 0–1.2)
BILIRUBIN DIRECT: 0.5 MG/DL (ref 0–0.3)
BILIRUBIN, INDIRECT: 0.7 MG/DL (ref 0–1)
HCT VFR BLD CALC: 29 % (ref 37–54)
HEMOGLOBIN: 9.3 G/DL (ref 12.5–16.5)
LV EF: 65 %
LVEF MODALITY: NORMAL
MAGNESIUM: 1.8 MG/DL (ref 1.6–2.6)
POTASSIUM SERPL-SCNC: 3.9 MMOL/L (ref 3.5–5)
TOTAL PROTEIN: 6 G/DL (ref 6.4–8.3)

## 2018-03-28 PROCEDURE — 83735 ASSAY OF MAGNESIUM: CPT

## 2018-03-28 PROCEDURE — 84132 ASSAY OF SERUM POTASSIUM: CPT

## 2018-03-28 PROCEDURE — 2580000003 HC RX 258: Performed by: INTERNAL MEDICINE

## 2018-03-28 PROCEDURE — 99223 1ST HOSP IP/OBS HIGH 75: CPT | Performed by: INTERNAL MEDICINE

## 2018-03-28 PROCEDURE — 93306 TTE W/DOPPLER COMPLETE: CPT

## 2018-03-28 PROCEDURE — 36415 COLL VENOUS BLD VENIPUNCTURE: CPT

## 2018-03-28 PROCEDURE — 85014 HEMATOCRIT: CPT

## 2018-03-28 PROCEDURE — 85018 HEMOGLOBIN: CPT

## 2018-03-28 PROCEDURE — APPSS60 APP SPLIT SHARED TIME 46-60 MINUTES: Performed by: NURSE PRACTITIONER

## 2018-03-28 PROCEDURE — 80076 HEPATIC FUNCTION PANEL: CPT

## 2018-03-28 RX ORDER — NADOLOL 40 MG/1
40 TABLET ORAL DAILY
Qty: 30 TABLET | Refills: 0 | Status: SHIPPED | OUTPATIENT
Start: 2018-03-29 | End: 2020-01-01

## 2018-03-28 RX ADMIN — Medication 10 ML: at 09:00

## 2018-03-28 ASSESSMENT — PAIN SCALES - GENERAL
PAINLEVEL_OUTOF10: 0

## 2018-03-28 NOTE — PATIENT CARE CONFERENCE
[facility] Quality Flow/Interdisciplinary Rounds Progress Note        Quality Flow Rounds held on March 28, 2018    Disciplines Attending:  Bedside Nurse, ,  and Nursing Unit Leadership    Airam Escoto was admitted on 3/26/2018  3:00 PM    Anticipated Discharge Date:  Expected Discharge Date: 03/29/18    Disposition:    Misha Score:  Misha Scale Score: 22    Readmission Risk              Readmission Risk:        1.25       Age 72 or Greater:  0    Admitted from SNF or Requires Paid or Family Care:  0    Currently has CHF,COPD,ARF,CRI,or is on dialysis:  0    Takes more than 5 Prescription Medications:  0    Takes Digoxin,Insulin,Anticoagulants,Narcotics or ASA/Plavix:  [address] in Past 12 Months:  0    On Disability:  0    Patient Considers own Health:  1.25          Discussed patient goal for the day, patient clinical progression, and barriers to discharge.   The following Goal(s) of the Day/Commitment(s) have been identified:  Monitor labs, intake, and output, cardiology consulted for VT, discharge planning      Jayant Campbell  March 28, 2018

## 2018-03-28 NOTE — CONSULTS
throat. No epistaxis   · Cardiovascular: Denies chest pain, pressure or palpitations. No lower extremity swelling. · Respiratory: Denies ORTIZ, cough, orthopnea or PND. No hemoptysis   · Gastrointestinal: + hematemesis/melena. Denies anorexia. · Genitourinary: Denies urgency, dysuria or hematuria. · Musculoskeletal: Denies gait disturbance, weakness or joint complaints  · Integumentary: Denies rash, hives or pruritis   · Neurological: Denies dizziness, headaches or seizures. No numbness or tingling  · Psychiatric: Denies anxiety or depression. · Endocrine: Denies temperature intolerance. No recent weight change. .  · Hematologic/Lymphatic: Denies abnormal bruising or bleeding. No swollen lymph nodes    PHYSICAL EXAM:   BP (!) 97/58   Pulse 60   Temp 97.8 °F (36.6 °C) (Oral)   Resp 20   Ht 5' 9\" (1.753 m)   Wt 171 lb 12.8 oz (77.9 kg)   SpO2 98%   BMI 25.37 kg/m²   CONST:  Well developed,  male who appears of stated age. Awake, alert and cooperative. No apparent distress. HEENT:   Head- Normocephalic, atraumatic   Eyes- Conjunctivae pink, anicteric  Throat- Oral mucosa pink and moist  Neck-  No stridor, trachea midline, no jugular venous distention. No carotid bruit. CHEST: Chest symmetrical and non-tender to palpation. No accessory muscle use or intercostal retractions  RESPIRATORY: Lung sounds - clear throughout fields   CARDIOVASCULAR:     Heart Inspection- shows no noted pulsations  Heart Palpation- no heaves or thrills; PMI is non-displaced   Heart Ausculation- Regular rate and rhythm, no murmur. No s3, s4 or rub   PV: No lower extremity edema. No varicosities. Pedal pulses palpable, no clubbing or cyanosis   ABDOMEN: Soft, non-tender to light palpation. Bowel sounds present. No palpable masses no organomegaly; no abdominal bruit  MS: Good muscle strength and tone. No atrophy or abnormal movements.    : Deferred  SKIN: Warm and dry no statis dermatitis or ulcers   NEURO / PSYCH: Oriented to person, place and time. Speech clear and appropriate. Follows all commands. Pleasant affect     DATA:    ECG 3/26/2018:  ST rate 124 no acute STT wave changes  Tele strips: Sinus rhythm with NSVT 3/27/2018 [id number]    Diagnostic:  No radiographic studies    Intake/Output Summary (Last 24 hours) at 03/28/18 0919  Last data filed at 03/28/18 0447   Gross per 24 hour   Intake             1030 ml   Output              250 ml   Net              780 ml       Labs:   CBC:   Recent Labs      03/26/18   1516  03/27/18   0315   03/27/18   2240  03/28/18   0500   WBC  7.5  3.0*   --    --    --    HGB  13.0  9.9*   < >  9.1*  9.3*   HCT  39.6  30.9*   < >  28.1*  29.0*   PLT  147  78*   --    --    --     < > = values in this interval not displayed. BMP: Recent Labs      03/26/18   1516  03/27/18   0315   NA  139  140   K  4.2  4.1   CO2  24  22   BUN  35*  31*   CREATININE  0.8  0.9   LABGLOM  >60  >60   CALCIUM  9.9  9.0     Mag:   Recent Labs      03/27/18   0315   MG  1.8     HgA1c:   Lab Results   Component Value Date    LABA1C 4.7 (L) 04/30/2013     proBNP:   Recent Labs      03/26/18   1516   PROBNP  128*     PT/INR:   Recent Labs      03/26/18   1516   PROTIME  13.0*   INR  1.1     APTT:  Recent Labs      03/26/18   1516   APTT  32.0     CARDIAC ENZYMES:  Recent Labs      03/26/18   1516   TROPONINI  <0.01     FASTING LIPID PANEL:  Lab Results   Component Value Date    CHOL 139 04/30/2013    HDL 78.8 04/30/2013    LDLCALC 46 04/30/2013    TRIG 72 04/30/2013     LIVER PROFILE:  Recent Labs      03/27/18   0315  03/28/18   0500   AST  69*  51*   ALT  52*  40   LABALBU  3.6  3.5   ASSESSMENT  1. GIB s/p EGD 3/26/2018 s/p Nonbleeding esophageal varices, three bands. Hx of variceal banding in 2016  2. Cirrhosis/Portal HTN  3. ETOH abuse  4. HTN  5. NSVT: asymptomatic (Mag 1.8/K+ 4.1)  6. Anemia    PLAN:  1.  NSVT may occur as a manifestation of ischemia or structural heart disease, however it may occur in normal hearts due to systemic disease and hyperadrenergic states. Whether it is a marker of sudden death risk depends upon aforementioned differentiation. 2. Currently will obtain Echo, may eventually do ischemic evaluation. 3. Discussed with Dr Luis F Shaw  Electronically signed by Yaw Enrique. ISAEL Zhong on 3/28/2018 at 9:19 AM     The patient is interviewed and examined by me. He is alert pleasant and appropriate. Skin is warm and dry. Respirations are unlabored. He is normotensive. Lung fields are clear. The cardiac apex is not displaced. Grade 2 ejection sternal border. Abdomen is obese. Abdomen is normal.    The findings were confirmed in discussion with Ms. Danny Miller. Assessment and plan by me.     SHONA Gaming    I have personally interviewed the patient, independently performed a focused cardiac exam, reviewed the pertinent laboratory and diagnostic testing results with the patient, and directly participated in the medical decision-making as noted above with additions and corrections as appropriate>> Noe Rao MD

## 2018-03-28 NOTE — DISCHARGE SUMMARY
[facility] Hospitalist Physician Discharge Summary       Follow-up With  Details  Why  Ismael Quiroz MD  Schedule an appointment as soon as possible for a visit in 2 weeks  Make an appointment in 2 weeks to go over hospitalization, medications, and follow up.   [place] [phone] [facility]     Raina Santo MD  Call  hospital follow up  [address] [place] [address]  [phone]         Activity level: As tolerated    Diet: regular diet    Dispo:Home    Patient ID:  Alda Rodriguez  [id number]  61 y.o.  1955    Admit date: 3/26/2018    Discharge date and time:  3/28/2018  2:08 PM    Admission Diagnoses: Active Problems:    Acute blood loss anemia    Alcohol abuse    GI bleed    Esophageal varices (HCC)  Resolved Problems:    * No resolved hospital problems. *      Discharge Diagnoses: Active Problems:    Acute blood loss anemia    Alcohol abuse    GI bleed    Esophageal varices (HCC)  Resolved Problems:    * No resolved hospital problems. *      Consults:  IP CONSULT TO GI  IP CONSULT TO CARDIOLOGY    Procedures: EGD       CHIEF COMPLAINT:  Hematemesis, melana     History of Present Illness: This is a 61year old with a history of esophageal varices and previous banding in November of last year. He stated sometime yesterday he began to vomit bright red blood and he noticed dark stools at the same time. He has not been able to eat anything since. Early this morning he went 5 times to the bathroom within 1 hour. He admitted to starting alcohol again and he drinks now 8 beers a day from last November. He denies any other complaints. No other aggravating or alleviating factors. Hospital Course: Patient was admitted after vomiting bright red blood. He was evaluated by GI and taken for an  EGD. He was found to have 3 non-bleeding varices which were banded. He was continued on Nadolol. His H&H remained stable.  He was on a telemetry monitor where he had 18 beats of non-sustained v-tach. Cardiology was consulted and ordered an echocardiogram which was not able to completed by the time he was discharged. He was stable on the telemetry monitor so this testing will be able to be completed as an outpatient. By the day of discharge he was able to tolerate a soft diet. It was discussed with the patient that he stop drinking alcohol. He was advised to follow up with GI as an outpatient and establish a PCP. He was discharged home in stable condition with the following medications, instructions and follow-ups. Discharge Exam:  General Appearance: alert and oriented to person, place and time  Skin: warm and dry, no rash or erythema  Head: normocephalic and atraumatic  Eyes: extraocular eye movements intact  Neck: neck supple and non tender without mass  Pulmonary/Chest: clear to auscultation bilaterally  Cardiovascular: normal rate and normal S1 and S2  Abdomen: soft, non-tender, non-distended, normal bowel sounds, no masses or organomegaly  Extremities: no cyanosis and no clubbing  Musculoskeletal: normal range of motion, no joint swelling, deformity or tenderness    I/O last 3 completed shifts: In: 1030 [P.O.:480; I.V.:550]  Out: 250 [Urine:250]  I/O this shift:   In: 520 [P.O.:520]  Out: -       LABS:  Recent Labs      03/26/18   1516  03/27/18   0315  03/28/18   0500   NA  139  140   --    K  4.2  4.1  3.9   CL  101  107   --    CO2  24  22   --    BUN  35*  31*   --    CREATININE  0.8  0.9   --    GLUCOSE  104  118*   --    CALCIUM  9.9  9.0   --        Recent Labs      03/26/18   1516  03/27/18   0315   03/27/18   1555  03/27/18   2240  03/28/18   0500   WBC  7.5  3.0*   --    --    --    --    RBC  4.11  3.11*   --    --    --    --    HGB  13.0  9.9*   < >  9.8*  9.1*  9.3*   HCT  39.6  30.9*   < >  30.7*  28.1*  29.0*   MCV  96.4  99.4   --    --    --    --    MCH  31.6  31.8   --    --    --    --    MCHC  32.8  32.0   --    --    --    --    RDW  14.2 14.2   --    --    --    --    PLT  147  78*   --    --    --    --    MPV  10.6  11.0   --    --    --    --     < > = values in this interval not displayed. No results for input(s): POCGLU in the last 72 hours. Imaging:  No results found. Patient Instructions:   Current Discharge Medication List      CONTINUE these medications which have CHANGED    Details   !! nadolol (CORGARD) 40 MG tablet Take 1 tablet by mouth daily  Qty: 30 tablet, Refills: 0       !! - Potential duplicate medications found. Please discuss with provider. CONTINUE these medications which have NOT CHANGED    Details   !! nadolol (CORGARD) 40 MG tablet Take 1 tablet by mouth daily  Qty: 30 tablet, Refills: 3      ferrous sulfate (FE TABS) 325 (65 FE) MG EC tablet Take 1 tablet by mouth 2 times daily  Qty: 90 tablet, Refills: 3      sucralfate (CARAFATE) 1 GM tablet Take 1 tablet by mouth 4 times daily  Qty: 120 tablet, Refills: 0       !! - Potential duplicate medications found. Please discuss with provider.       STOP taking these medications       spironolactone (ALDACTONE) 50 MG tablet Comments:   Reason for Stopping:         lactulose (CHRONULAC) 10 GM/15ML solution Comments:   Reason for Stopping:         pantoprazole (PROTONIX) 40 MG tablet Comments:   Reason for Stopping:                 Note that more than 30 minutes was spent in preparing discharge papers, discussing discharge with patient, medication review, etc.    Signed:  Electronically signed by Sera Estes CNP on 3/28/2018 at 2:08 PM

## 2018-03-28 NOTE — OP NOTE
banding  and everything seemed to decompress. Procedure was terminated and well  tolerated. IMPRESSION:  Varices is the only potential source of bleeding. No active  bleeding. Liquids. Stay on the statin until tomorrow. Home in 24 to 48. Follow up with Dr. Clay Ware.         Ubaldo Andres MD    D: 03/27/2018 12:14:05       T: 03/27/2018 22:19:44     [email or web address]  Job#: [id number]     Doc#: [id number]    CC:

## 2018-03-28 NOTE — PLAN OF CARE
Problem: Falls - Risk of:  Goal: Will remain free from falls  Will remain free from falls   Outcome: Met This Shift      Problem: Pain:  Goal: Control of acute pain  Control of acute pain   Outcome: Met This Shift      Problem: Fluid Volume - Imbalance:  Goal: Will show no signs and symptoms of excessive bleeding  Will show no signs and symptoms of excessive bleeding   Outcome: Met This Shift

## 2018-04-05 ENCOUNTER — TELEPHONE (OUTPATIENT)
Dept: CARDIOLOGY CLINIC | Age: 63
End: 2018-04-05

## 2020-01-01 ENCOUNTER — HOSPITAL ENCOUNTER (OUTPATIENT)
Dept: INTERVENTIONAL RADIOLOGY/VASCULAR | Age: 65
Discharge: HOME | End: 2020-12-16
Payer: MEDICARE

## 2020-01-01 ENCOUNTER — HOSPITAL ENCOUNTER (OUTPATIENT)
Dept: INTERVENTIONAL RADIOLOGY/VASCULAR | Age: 65
Discharge: HOME | End: 2020-12-13
Payer: MEDICARE

## 2020-01-01 ENCOUNTER — APPOINTMENT (OUTPATIENT)
Dept: GENERAL RADIOLOGY | Age: 65
DRG: 378 | End: 2020-01-01
Payer: MEDICARE

## 2020-01-01 ENCOUNTER — OFFICE VISIT (OUTPATIENT)
Dept: HEMATOLOGY | Age: 65
End: 2020-01-01
Payer: MEDICARE

## 2020-01-01 ENCOUNTER — HOSPITAL ENCOUNTER (INPATIENT)
Age: 65
LOS: 4 days | Discharge: HOME | DRG: 378 | End: 2020-10-17
Attending: EMERGENCY MEDICINE | Admitting: INTERNAL MEDICINE
Payer: MEDICARE

## 2020-01-01 ENCOUNTER — APPOINTMENT (OUTPATIENT)
Dept: CT IMAGING | Age: 65
DRG: 378 | End: 2020-01-01
Payer: MEDICARE

## 2020-01-01 ENCOUNTER — HOSPITAL ENCOUNTER (OUTPATIENT)
Dept: INTERVENTIONAL RADIOLOGY/VASCULAR | Age: 65
Setting detail: OBSERVATION
Discharge: HOME | End: 2020-12-17
Attending: TRANSPLANT SURGERY | Admitting: TRANSPLANT SURGERY
Payer: MEDICARE

## 2020-01-01 ENCOUNTER — TELEPHONE (OUTPATIENT)
Dept: HEMATOLOGY | Age: 65
End: 2020-01-01

## 2020-01-01 ENCOUNTER — HOSPITAL ENCOUNTER (OUTPATIENT)
Dept: CT IMAGING | Age: 65
Discharge: HOME | End: 2020-12-13
Payer: MEDICARE

## 2020-01-01 ENCOUNTER — HOSPITAL ENCOUNTER (OUTPATIENT)
Age: 65
Discharge: HOME | End: 2020-12-06
Payer: MEDICARE

## 2020-01-01 ENCOUNTER — HOSPITAL ENCOUNTER (OUTPATIENT)
Dept: GENERAL RADIOLOGY | Age: 65
Discharge: HOME | End: 2020-12-12
Payer: MEDICARE

## 2020-01-01 ENCOUNTER — ANESTHESIA EVENT (OUTPATIENT)
Dept: ENDOSCOPY | Age: 65
DRG: 378 | End: 2020-01-01
Payer: MEDICARE

## 2020-01-01 ENCOUNTER — ANESTHESIA (OUTPATIENT)
Dept: ENDOSCOPY | Age: 65
DRG: 378 | End: 2020-01-01
Payer: MEDICARE

## 2020-01-01 ENCOUNTER — HOSPITAL ENCOUNTER (OUTPATIENT)
Dept: NUCLEAR MEDICINE | Age: 65
Discharge: HOME | End: 2020-12-16
Payer: MEDICARE

## 2020-01-01 ENCOUNTER — ANESTHESIA EVENT (OUTPATIENT)
Dept: INTERVENTIONAL RADIOLOGY/VASCULAR | Age: 65
End: 2020-01-01

## 2020-01-01 ENCOUNTER — TELEPHONE (OUTPATIENT)
Dept: GENERAL RADIOLOGY | Age: 65
End: 2020-01-01

## 2020-01-01 ENCOUNTER — ANESTHESIA (OUTPATIENT)
Dept: INTERVENTIONAL RADIOLOGY/VASCULAR | Age: 65
End: 2020-01-01

## 2020-01-01 VITALS
SYSTOLIC BLOOD PRESSURE: 143 MMHG | DIASTOLIC BLOOD PRESSURE: 81 MMHG | HEART RATE: 94 BPM | TEMPERATURE: 98.4 F | HEIGHT: 72 IN | WEIGHT: 187 LBS | BODY MASS INDEX: 25.33 KG/M2 | OXYGEN SATURATION: 97 % | RESPIRATION RATE: 20 BRPM

## 2020-01-01 VITALS
RESPIRATION RATE: 18 BRPM | OXYGEN SATURATION: 98 % | HEART RATE: 113 BPM | SYSTOLIC BLOOD PRESSURE: 150 MMHG | BODY MASS INDEX: 25.38 KG/M2 | DIASTOLIC BLOOD PRESSURE: 80 MMHG | TEMPERATURE: 98.4 F | HEIGHT: 72 IN | WEIGHT: 187.4 LBS

## 2020-01-01 VITALS
OXYGEN SATURATION: 100 % | DIASTOLIC BLOOD PRESSURE: 60 MMHG | RESPIRATION RATE: 16 BRPM | WEIGHT: 183.9 LBS | HEIGHT: 72 IN | TEMPERATURE: 97.5 F | BODY MASS INDEX: 24.91 KG/M2 | SYSTOLIC BLOOD PRESSURE: 132 MMHG | HEART RATE: 82 BPM

## 2020-01-01 VITALS
SYSTOLIC BLOOD PRESSURE: 128 MMHG | RESPIRATION RATE: 18 BRPM | WEIGHT: 190 LBS | HEIGHT: 72 IN | OXYGEN SATURATION: 97 % | TEMPERATURE: 99.3 F | BODY MASS INDEX: 25.73 KG/M2 | DIASTOLIC BLOOD PRESSURE: 70 MMHG | HEART RATE: 79 BPM

## 2020-01-01 VITALS — DIASTOLIC BLOOD PRESSURE: 57 MMHG | SYSTOLIC BLOOD PRESSURE: 108 MMHG | OXYGEN SATURATION: 100 %

## 2020-01-01 VITALS — OXYGEN SATURATION: 100 % | DIASTOLIC BLOOD PRESSURE: 81 MMHG | SYSTOLIC BLOOD PRESSURE: 164 MMHG

## 2020-01-01 VITALS
HEART RATE: 88 BPM | OXYGEN SATURATION: 99 % | DIASTOLIC BLOOD PRESSURE: 65 MMHG | BODY MASS INDEX: 25.33 KG/M2 | SYSTOLIC BLOOD PRESSURE: 134 MMHG | RESPIRATION RATE: 16 BRPM | HEIGHT: 72 IN | WEIGHT: 187 LBS | TEMPERATURE: 98 F

## 2020-01-01 LAB
ABO/RH: NORMAL
AFP-TUMOR MARKER: ABNORMAL NG/ML (ref 0–9)
ALBUMIN SERPL-MCNC: 2.5 G/DL (ref 3.5–5.2)
ALBUMIN SERPL-MCNC: 2.5 G/DL (ref 3.5–5.2)
ALBUMIN SERPL-MCNC: 2.7 G/DL (ref 3.5–5.2)
ALBUMIN SERPL-MCNC: 2.8 G/DL (ref 3.5–5.2)
ALBUMIN SERPL-MCNC: 3.3 G/DL (ref 3.5–5.2)
ALBUMIN SERPL-MCNC: 3.5 G/DL (ref 3.5–5.2)
ALP BLD-CCNC: 49 U/L (ref 40–129)
ALP BLD-CCNC: 54 U/L (ref 40–129)
ALP BLD-CCNC: 60 U/L (ref 40–129)
ALP BLD-CCNC: 62 U/L (ref 40–129)
ALP BLD-CCNC: 66 U/L (ref 40–129)
ALP BLD-CCNC: 90 U/L (ref 40–129)
ALT SERPL-CCNC: 15 U/L (ref 0–40)
ALT SERPL-CCNC: 15 U/L (ref 0–40)
ALT SERPL-CCNC: 18 U/L (ref 0–40)
ALT SERPL-CCNC: 22 U/L (ref 0–40)
ALT SERPL-CCNC: 24 U/L (ref 0–40)
ALT SERPL-CCNC: 6 U/L (ref 0–40)
ANION GAP SERPL CALCULATED.3IONS-SCNC: 12 MMOL/L (ref 7–16)
ANION GAP SERPL CALCULATED.3IONS-SCNC: 4 MMOL/L (ref 7–16)
ANION GAP SERPL CALCULATED.3IONS-SCNC: 4 MMOL/L (ref 7–16)
ANION GAP SERPL CALCULATED.3IONS-SCNC: 5 MMOL/L (ref 7–16)
ANION GAP SERPL CALCULATED.3IONS-SCNC: 5 MMOL/L (ref 7–16)
ANION GAP SERPL CALCULATED.3IONS-SCNC: 6 MMOL/L (ref 7–16)
ANISOCYTOSIS: ABNORMAL
ANTIBODY SCREEN: NORMAL
APTT: 32.9 SEC (ref 24.5–35.1)
AST SERPL-CCNC: 34 U/L (ref 0–39)
AST SERPL-CCNC: 40 U/L (ref 0–39)
AST SERPL-CCNC: 41 U/L (ref 0–39)
AST SERPL-CCNC: 42 U/L (ref 0–39)
AST SERPL-CCNC: 58 U/L (ref 0–39)
AST SERPL-CCNC: 64 U/L (ref 0–39)
BASOPHILIC STIPPLING: ABNORMAL
BASOPHILS ABSOLUTE: 0.01 E9/L (ref 0–0.2)
BASOPHILS ABSOLUTE: 0.02 E9/L (ref 0–0.2)
BASOPHILS ABSOLUTE: 0.03 E9/L (ref 0–0.2)
BASOPHILS RELATIVE PERCENT: 0.6 % (ref 0–2)
BASOPHILS RELATIVE PERCENT: 0.7 % (ref 0–2)
BASOPHILS RELATIVE PERCENT: 0.7 % (ref 0–2)
BASOPHILS RELATIVE PERCENT: 0.8 % (ref 0–2)
BASOPHILS RELATIVE PERCENT: 0.9 % (ref 0–2)
BASOPHILS RELATIVE PERCENT: 0.9 % (ref 0–2)
BASOPHILS RELATIVE PERCENT: 1.1 % (ref 0–2)
BILIRUB SERPL-MCNC: 0.9 MG/DL (ref 0–1.2)
BILIRUB SERPL-MCNC: 1 MG/DL (ref 0–1.2)
BILIRUB SERPL-MCNC: 1.3 MG/DL (ref 0–1.2)
BILIRUB SERPL-MCNC: 1.4 MG/DL (ref 0–1.2)
BILIRUB SERPL-MCNC: 1.5 MG/DL (ref 0–1.2)
BILIRUB SERPL-MCNC: 1.7 MG/DL (ref 0–1.2)
BILIRUBIN DIRECT: 0.3 MG/DL (ref 0–0.3)
BILIRUBIN DIRECT: 0.4 MG/DL (ref 0–0.3)
BILIRUBIN DIRECT: 0.4 MG/DL (ref 0–0.3)
BILIRUBIN DIRECT: 0.5 MG/DL (ref 0–0.3)
BILIRUBIN, INDIRECT: 0.5 MG/DL (ref 0–1)
BILIRUBIN, INDIRECT: 0.6 MG/DL (ref 0–1)
BILIRUBIN, INDIRECT: 0.8 MG/DL (ref 0–1)
BILIRUBIN, INDIRECT: 1.1 MG/DL (ref 0–1)
BLOOD BANK DISPENSE STATUS: NORMAL
BLOOD BANK PRODUCT CODE: NORMAL
BPU ID: NORMAL
BUN BLDV-MCNC: 11 MG/DL (ref 8–23)
BUN BLDV-MCNC: 18 MG/DL (ref 8–23)
BUN BLDV-MCNC: 21 MG/DL (ref 8–23)
BUN BLDV-MCNC: 25 MG/DL (ref 8–23)
BUN BLDV-MCNC: 6 MG/DL (ref 8–23)
BUN BLDV-MCNC: 9 MG/DL (ref 8–23)
CALCIUM SERPL-MCNC: 8 MG/DL (ref 8.6–10.2)
CALCIUM SERPL-MCNC: 8 MG/DL (ref 8.6–10.2)
CALCIUM SERPL-MCNC: 8.2 MG/DL (ref 8.6–10.2)
CALCIUM SERPL-MCNC: 8.5 MG/DL (ref 8.6–10.2)
CALCIUM SERPL-MCNC: 8.8 MG/DL (ref 8.6–10.2)
CALCIUM SERPL-MCNC: 9.1 MG/DL (ref 8.6–10.2)
CHLORIDE BLD-SCNC: 103 MMOL/L (ref 98–107)
CHLORIDE BLD-SCNC: 105 MMOL/L (ref 98–107)
CHLORIDE BLD-SCNC: 106 MMOL/L (ref 98–107)
CHLORIDE BLD-SCNC: 109 MMOL/L (ref 98–107)
CO2: 23 MMOL/L (ref 22–29)
CO2: 23 MMOL/L (ref 22–29)
CO2: 24 MMOL/L (ref 22–29)
CO2: 24 MMOL/L (ref 22–29)
CO2: 25 MMOL/L (ref 22–29)
CO2: 26 MMOL/L (ref 22–29)
CREAT SERPL-MCNC: 0.8 MG/DL (ref 0.7–1.2)
CREAT SERPL-MCNC: 0.8 MG/DL (ref 0.7–1.2)
CREAT SERPL-MCNC: 0.9 MG/DL (ref 0.7–1.2)
CREAT SERPL-MCNC: 1 MG/DL (ref 0.7–1.2)
CREAT SERPL-MCNC: 1 MG/DL (ref 0.7–1.2)
CREAT SERPL-MCNC: 1.1 MG/DL (ref 0.7–1.2)
DESCRIPTION BLOOD BANK: NORMAL
EKG ATRIAL RATE: 103 BPM
EKG P AXIS: 63 DEGREES
EKG P-R INTERVAL: 176 MS
EKG Q-T INTERVAL: 362 MS
EKG QRS DURATION: 74 MS
EKG QTC CALCULATION (BAZETT): 474 MS
EKG R AXIS: 42 DEGREES
EKG T AXIS: 3 DEGREES
EKG VENTRICULAR RATE: 103 BPM
EOSINOPHILS ABSOLUTE: 0.02 E9/L (ref 0.05–0.5)
EOSINOPHILS ABSOLUTE: 0.03 E9/L (ref 0.05–0.5)
EOSINOPHILS ABSOLUTE: 0.04 E9/L (ref 0.05–0.5)
EOSINOPHILS ABSOLUTE: 0.04 E9/L (ref 0.05–0.5)
EOSINOPHILS ABSOLUTE: 0.06 E9/L (ref 0.05–0.5)
EOSINOPHILS RELATIVE PERCENT: 0.6 % (ref 0–6)
EOSINOPHILS RELATIVE PERCENT: 1.1 % (ref 0–6)
EOSINOPHILS RELATIVE PERCENT: 1.7 % (ref 0–6)
EOSINOPHILS RELATIVE PERCENT: 2.7 % (ref 0–6)
EOSINOPHILS RELATIVE PERCENT: 2.9 % (ref 0–6)
EOSINOPHILS RELATIVE PERCENT: 3.4 % (ref 0–6)
EOSINOPHILS RELATIVE PERCENT: 4.3 % (ref 0–6)
GFR AFRICAN AMERICAN: >60
GFR NON-AFRICAN AMERICAN: >60 ML/MIN/1.73
GLUCOSE BLD-MCNC: 101 MG/DL (ref 74–99)
GLUCOSE BLD-MCNC: 102 MG/DL (ref 74–99)
GLUCOSE BLD-MCNC: 104 MG/DL (ref 74–99)
GLUCOSE BLD-MCNC: 86 MG/DL (ref 74–99)
GLUCOSE BLD-MCNC: 87 MG/DL (ref 74–99)
GLUCOSE BLD-MCNC: 91 MG/DL (ref 74–99)
HAV IGM SER IA-ACNC: ABNORMAL
HCT VFR BLD CALC: 21.2 % (ref 37–54)
HCT VFR BLD CALC: 21.8 % (ref 37–54)
HCT VFR BLD CALC: 22.1 % (ref 37–54)
HCT VFR BLD CALC: 23.3 % (ref 37–54)
HCT VFR BLD CALC: 25.4 % (ref 37–54)
HCT VFR BLD CALC: 25.9 % (ref 37–54)
HCT VFR BLD CALC: 26.2 % (ref 37–54)
HCT VFR BLD CALC: 26.7 % (ref 37–54)
HCT VFR BLD CALC: 27.7 % (ref 37–54)
HCT VFR BLD CALC: 28.6 % (ref 37–54)
HEMOGLOBIN: 6.3 G/DL (ref 12.5–16.5)
HEMOGLOBIN: 6.5 G/DL (ref 12.5–16.5)
HEMOGLOBIN: 6.6 G/DL (ref 12.5–16.5)
HEMOGLOBIN: 7.1 G/DL (ref 12.5–16.5)
HEMOGLOBIN: 8 G/DL (ref 12.5–16.5)
HEMOGLOBIN: 8.1 G/DL (ref 12.5–16.5)
HEMOGLOBIN: 8.1 G/DL (ref 12.5–16.5)
HEMOGLOBIN: 8.3 G/DL (ref 12.5–16.5)
HEMOGLOBIN: 8.4 G/DL (ref 12.5–16.5)
HEMOGLOBIN: 8.7 G/DL (ref 12.5–16.5)
HEPATITIS B CORE IGM ANTIBODY: ABNORMAL
HEPATITIS B SURFACE ANTIGEN INTERPRETATION: ABNORMAL
HEPATITIS C ANTIBODY INTERPRETATION: REACTIVE
HYPOCHROMIA: ABNORMAL
IMMATURE GRANULOCYTES #: 0 E9/L
IMMATURE GRANULOCYTES #: 0.01 E9/L
IMMATURE GRANULOCYTES #: 0.01 E9/L
IMMATURE GRANULOCYTES #: 0.02 E9/L
IMMATURE GRANULOCYTES %: 0 % (ref 0–5)
IMMATURE GRANULOCYTES %: 0.4 % (ref 0–5)
IMMATURE GRANULOCYTES %: 0.6 % (ref 0–5)
IMMATURE GRANULOCYTES %: 0.7 % (ref 0–5)
INR BLD: 1.3
INR BLD: 1.4
INR BLD: 1.5
INR BLD: 1.5
LACTIC ACID: 1.5 MMOL/L (ref 0.5–2.2)
LACTIC ACID: 2.1 MMOL/L (ref 0.5–2.2)
LIPASE: 29 U/L (ref 13–60)
LYMPHOCYTES ABSOLUTE: 0.16 E9/L (ref 1.5–4)
LYMPHOCYTES ABSOLUTE: 0.31 E9/L (ref 1.5–4)
LYMPHOCYTES ABSOLUTE: 0.36 E9/L (ref 1.5–4)
LYMPHOCYTES ABSOLUTE: 0.38 E9/L (ref 1.5–4)
LYMPHOCYTES ABSOLUTE: 0.39 E9/L (ref 1.5–4)
LYMPHOCYTES ABSOLUTE: 0.5 E9/L (ref 1.5–4)
LYMPHOCYTES ABSOLUTE: 0.7 E9/L (ref 1.5–4)
LYMPHOCYTES RELATIVE PERCENT: 13.6 % (ref 20–42)
LYMPHOCYTES RELATIVE PERCENT: 26.3 % (ref 20–42)
LYMPHOCYTES RELATIVE PERCENT: 27 % (ref 20–42)
LYMPHOCYTES RELATIVE PERCENT: 27.6 % (ref 20–42)
LYMPHOCYTES RELATIVE PERCENT: 27.9 % (ref 20–42)
LYMPHOCYTES RELATIVE PERCENT: 33.1 % (ref 20–42)
LYMPHOCYTES RELATIVE PERCENT: 9.6 % (ref 20–42)
MCH RBC QN AUTO: 21.8 PG (ref 26–35)
MCH RBC QN AUTO: 23.7 PG (ref 26–35)
MCH RBC QN AUTO: 23.8 PG (ref 26–35)
MCH RBC QN AUTO: 23.8 PG (ref 26–35)
MCH RBC QN AUTO: 25 PG (ref 26–35)
MCH RBC QN AUTO: 25.2 PG (ref 26–35)
MCH RBC QN AUTO: 25.4 PG (ref 26–35)
MCHC RBC AUTO-ENTMCNC: 28.9 % (ref 32–34.5)
MCHC RBC AUTO-ENTMCNC: 29.4 % (ref 32–34.5)
MCHC RBC AUTO-ENTMCNC: 29.9 % (ref 32–34.5)
MCHC RBC AUTO-ENTMCNC: 30.5 % (ref 32–34.5)
MCHC RBC AUTO-ENTMCNC: 30.5 % (ref 32–34.5)
MCHC RBC AUTO-ENTMCNC: 31.1 % (ref 32–34.5)
MCHC RBC AUTO-ENTMCNC: 31.3 % (ref 32–34.5)
MCV RBC AUTO: 75.4 FL (ref 80–99.9)
MCV RBC AUTO: 78.2 FL (ref 80–99.9)
MCV RBC AUTO: 79.2 FL (ref 80–99.9)
MCV RBC AUTO: 80.4 FL (ref 80–99.9)
MCV RBC AUTO: 81 FL (ref 80–99.9)
MCV RBC AUTO: 81.2 FL (ref 80–99.9)
MCV RBC AUTO: 82.6 FL (ref 80–99.9)
MONOCYTES ABSOLUTE: 0.06 E9/L (ref 0.1–0.95)
MONOCYTES ABSOLUTE: 0.12 E9/L (ref 0.1–0.95)
MONOCYTES ABSOLUTE: 0.14 E9/L (ref 0.1–0.95)
MONOCYTES ABSOLUTE: 0.14 E9/L (ref 0.1–0.95)
MONOCYTES ABSOLUTE: 0.19 E9/L (ref 0.1–0.95)
MONOCYTES ABSOLUTE: 0.19 E9/L (ref 0.1–0.95)
MONOCYTES ABSOLUTE: 0.41 E9/L (ref 0.1–0.95)
MONOCYTES RELATIVE PERCENT: 10.5 % (ref 2–12)
MONOCYTES RELATIVE PERCENT: 11.9 % (ref 2–12)
MONOCYTES RELATIVE PERCENT: 12.6 % (ref 2–12)
MONOCYTES RELATIVE PERCENT: 13.5 % (ref 2–12)
MONOCYTES RELATIVE PERCENT: 4.3 % (ref 2–12)
MONOCYTES RELATIVE PERCENT: 8 % (ref 2–12)
MONOCYTES RELATIVE PERCENT: 8.8 % (ref 2–12)
MRSA CULTURE ONLY: NORMAL
NEUTROPHILS ABSOLUTE: 0.6 E9/L (ref 1.8–7.3)
NEUTROPHILS ABSOLUTE: 0.62 E9/L (ref 1.8–7.3)
NEUTROPHILS ABSOLUTE: 0.77 E9/L (ref 1.8–7.3)
NEUTROPHILS ABSOLUTE: 0.83 E9/L (ref 1.8–7.3)
NEUTROPHILS ABSOLUTE: 1.07 E9/L (ref 1.8–7.3)
NEUTROPHILS ABSOLUTE: 1.34 E9/L (ref 1.8–7.3)
NEUTROPHILS ABSOLUTE: 3.94 E9/L (ref 1.8–7.3)
NEUTROPHILS RELATIVE PERCENT: 50.8 % (ref 43–80)
NEUTROPHILS RELATIVE PERCENT: 54.5 % (ref 43–80)
NEUTROPHILS RELATIVE PERCENT: 55.9 % (ref 43–80)
NEUTROPHILS RELATIVE PERCENT: 59.1 % (ref 43–80)
NEUTROPHILS RELATIVE PERCENT: 60.6 % (ref 43–80)
NEUTROPHILS RELATIVE PERCENT: 76.8 % (ref 43–80)
NEUTROPHILS RELATIVE PERCENT: 83.5 % (ref 43–80)
OVALOCYTES: ABNORMAL
PDW BLD-RTO: 19.2 FL (ref 11.5–15)
PDW BLD-RTO: 21.1 FL (ref 11.5–15)
PDW BLD-RTO: 21.3 FL (ref 11.5–15)
PDW BLD-RTO: 21.6 FL (ref 11.5–15)
PDW BLD-RTO: 21.9 FL (ref 11.5–15)
PLATELET # BLD: 101 E9/L (ref 130–450)
PLATELET # BLD: 48 E9/L (ref 130–450)
PLATELET # BLD: 49 E9/L (ref 130–450)
PLATELET # BLD: 49 E9/L (ref 130–450)
PLATELET # BLD: 52 E9/L (ref 130–450)
PLATELET # BLD: 56 E9/L (ref 130–450)
PLATELET # BLD: 78 E9/L (ref 130–450)
PLATELET CONFIRMATION: NORMAL
PMV BLD AUTO: 10.2 FL (ref 7–12)
PMV BLD AUTO: 10.2 FL (ref 7–12)
PMV BLD AUTO: 10.8 FL (ref 7–12)
PMV BLD AUTO: 11 FL (ref 7–12)
PMV BLD AUTO: ABNORMAL FL (ref 7–12)
POIKILOCYTES: ABNORMAL
POLYCHROMASIA: ABNORMAL
POTASSIUM REFLEX MAGNESIUM: 4 MMOL/L (ref 3.5–5)
POTASSIUM REFLEX MAGNESIUM: 4.3 MMOL/L (ref 3.5–5)
POTASSIUM SERPL-SCNC: 3.3 MMOL/L (ref 3.5–5)
POTASSIUM SERPL-SCNC: 3.5 MMOL/L (ref 3.5–5)
POTASSIUM SERPL-SCNC: 4.2 MMOL/L (ref 3.5–5)
POTASSIUM SERPL-SCNC: 4.3 MMOL/L (ref 3.5–5)
POTASSIUM SERPL-SCNC: 4.6 MMOL/L (ref 3.5–5)
PROTHROMBIN TIME: 14.7 SEC (ref 9.3–12.4)
PROTHROMBIN TIME: 15.3 SEC (ref 9.3–12.4)
PROTHROMBIN TIME: 15.7 SEC (ref 9.3–12.4)
PROTHROMBIN TIME: 16.3 SEC (ref 9.3–12.4)
PROTHROMBIN TIME: 17.4 SEC (ref 9.3–12.4)
PROTHROMBIN TIME: 18.2 SEC (ref 9.3–12.4)
RBC # BLD: 2.79 E12/L (ref 3.8–5.8)
RBC # BLD: 2.89 E12/L (ref 3.8–5.8)
RBC # BLD: 2.98 E12/L (ref 3.8–5.8)
RBC # BLD: 3.17 E12/L (ref 3.8–5.8)
RBC # BLD: 3.19 E12/L (ref 3.8–5.8)
RBC # BLD: 3.32 E12/L (ref 3.8–5.8)
RBC # BLD: 3.53 E12/L (ref 3.8–5.8)
SARS-COV-2, NAAT: NOT DETECTED
SARS-COV-2: NOT DETECTED
SCHISTOCYTES: ABNORMAL
SODIUM BLD-SCNC: 133 MMOL/L (ref 132–146)
SODIUM BLD-SCNC: 135 MMOL/L (ref 132–146)
SODIUM BLD-SCNC: 136 MMOL/L (ref 132–146)
SODIUM BLD-SCNC: 140 MMOL/L (ref 132–146)
SOURCE: NORMAL
STOMATOCYTES: ABNORMAL
TARGET CELLS: ABNORMAL
TOTAL PROTEIN: 5.7 G/DL (ref 6.4–8.3)
TOTAL PROTEIN: 5.8 G/DL (ref 6.4–8.3)
TOTAL PROTEIN: 6 G/DL (ref 6.4–8.3)
TOTAL PROTEIN: 6.1 G/DL (ref 6.4–8.3)
TOTAL PROTEIN: 7.3 G/DL (ref 6.4–8.3)
TOTAL PROTEIN: 7.9 G/DL (ref 6.4–8.3)
TROPONIN: <0.01 NG/ML (ref 0–0.03)
VACUOLATED NEUTROPHILS: ABNORMAL
WBC # BLD: 1.1 E9/L (ref 4.5–11.5)
WBC # BLD: 1.2 E9/L (ref 4.5–11.5)
WBC # BLD: 1.4 E9/L (ref 4.5–11.5)
WBC # BLD: 1.4 E9/L (ref 4.5–11.5)
WBC # BLD: 1.6 E9/L (ref 4.5–11.5)
WBC # BLD: 1.8 E9/L (ref 4.5–11.5)
WBC # BLD: 5.1 E9/L (ref 4.5–11.5)

## 2020-01-01 PROCEDURE — 2709999900 HC NON-CHARGEABLE SUPPLY: Performed by: INTERNAL MEDICINE

## 2020-01-01 PROCEDURE — 79445 NUCLEAR RX INTRA-ARTERIAL: CPT

## 2020-01-01 PROCEDURE — 6360000002 HC RX W HCPCS: Performed by: RADIOLOGY

## 2020-01-01 PROCEDURE — 86900 BLOOD TYPING SEROLOGIC ABO: CPT

## 2020-01-01 PROCEDURE — 85610 PROTHROMBIN TIME: CPT

## 2020-01-01 PROCEDURE — 75774 ARTERY X-RAY EACH VESSEL: CPT | Performed by: RADIOLOGY

## 2020-01-01 PROCEDURE — 71260 CT THORAX DX C+: CPT | Performed by: RADIOLOGY

## 2020-01-01 PROCEDURE — 75726 ARTERY X-RAYS ABDOMEN: CPT | Performed by: RADIOLOGY

## 2020-01-01 PROCEDURE — 80048 BASIC METABOLIC PNL TOTAL CA: CPT

## 2020-01-01 PROCEDURE — 88342 IMHCHEM/IMCYTCHM 1ST ANTB: CPT

## 2020-01-01 PROCEDURE — 82248 BILIRUBIN DIRECT: CPT

## 2020-01-01 PROCEDURE — C2616 BRACHYTX, NON-STR,YTTRIUM-90: HCPCS | Performed by: RADIOLOGY

## 2020-01-01 PROCEDURE — 2580000003 HC RX 258: Performed by: INTERNAL MEDICINE

## 2020-01-01 PROCEDURE — 85025 COMPLETE CBC W/AUTO DIFF WBC: CPT

## 2020-01-01 PROCEDURE — 3017F COLORECTAL CA SCREEN DOC REV: CPT | Performed by: TRANSPLANT SURGERY

## 2020-01-01 PROCEDURE — G8484 FLU IMMUNIZE NO ADMIN: HCPCS | Performed by: TRANSPLANT SURGERY

## 2020-01-01 PROCEDURE — C9113 INJ PANTOPRAZOLE SODIUM, VIA: HCPCS | Performed by: INTERNAL MEDICINE

## 2020-01-01 PROCEDURE — 88307 TISSUE EXAM BY PATHOLOGIST: CPT

## 2020-01-01 PROCEDURE — 6360000002 HC RX W HCPCS: Performed by: EMERGENCY MEDICINE

## 2020-01-01 PROCEDURE — 76937 US GUIDE VASCULAR ACCESS: CPT

## 2020-01-01 PROCEDURE — 96376 TX/PRO/DX INJ SAME DRUG ADON: CPT

## 2020-01-01 PROCEDURE — 3700000001 HC ADD 15 MINUTES (ANESTHESIA)

## 2020-01-01 PROCEDURE — 6370000000 HC RX 637 (ALT 250 FOR IP): Performed by: INTERNAL MEDICINE

## 2020-01-01 PROCEDURE — 93005 ELECTROCARDIOGRAM TRACING: CPT | Performed by: NURSE PRACTITIONER

## 2020-01-01 PROCEDURE — 2709999900 IR PORT PLACEMENT > 5 YEARS

## 2020-01-01 PROCEDURE — 2000000000 HC ICU R&B

## 2020-01-01 PROCEDURE — 1123F ACP DISCUSS/DSCN MKR DOCD: CPT | Performed by: TRANSPLANT SURGERY

## 2020-01-01 PROCEDURE — 2500000003 HC RX 250 WO HCPCS: Performed by: INTERNAL MEDICINE

## 2020-01-01 PROCEDURE — 99233 SBSQ HOSP IP/OBS HIGH 50: CPT | Performed by: INTERNAL MEDICINE

## 2020-01-01 PROCEDURE — 80076 HEPATIC FUNCTION PANEL: CPT

## 2020-01-01 PROCEDURE — 6360000002 HC RX W HCPCS: Performed by: INTERNAL MEDICINE

## 2020-01-01 PROCEDURE — 83605 ASSAY OF LACTIC ACID: CPT

## 2020-01-01 PROCEDURE — C9113 INJ PANTOPRAZOLE SODIUM, VIA: HCPCS | Performed by: EMERGENCY MEDICINE

## 2020-01-01 PROCEDURE — 78201 LIVER IMAGING STATIC ONLY: CPT

## 2020-01-01 PROCEDURE — 2060000000 HC ICU INTERMEDIATE R&B

## 2020-01-01 PROCEDURE — 85730 THROMBOPLASTIN TIME PARTIAL: CPT

## 2020-01-01 PROCEDURE — 6360000004 HC RX CONTRAST MEDICATION

## 2020-01-01 PROCEDURE — 36561 INSERT TUNNELED CV CATH: CPT | Performed by: RADIOLOGY

## 2020-01-01 PROCEDURE — A9540 TC99M MAA: HCPCS | Performed by: RADIOLOGY

## 2020-01-01 PROCEDURE — 99239 HOSP IP/OBS DSCHRG MGMT >30: CPT | Performed by: INTERNAL MEDICINE

## 2020-01-01 PROCEDURE — 3700000000 HC ANESTHESIA ATTENDED CARE: Performed by: INTERNAL MEDICINE

## 2020-01-01 PROCEDURE — 6360000002 HC RX W HCPCS: Performed by: STUDENT IN AN ORGANIZED HEALTH CARE EDUCATION/TRAINING PROGRAM

## 2020-01-01 PROCEDURE — 86850 RBC ANTIBODY SCREEN: CPT

## 2020-01-01 PROCEDURE — 36415 COLL VENOUS BLD VENIPUNCTURE: CPT

## 2020-01-01 PROCEDURE — G8417 CALC BMI ABV UP PARAM F/U: HCPCS | Performed by: TRANSPLANT SURGERY

## 2020-01-01 PROCEDURE — 6360000004 HC RX CONTRAST MEDICATION: Performed by: RADIOLOGY

## 2020-01-01 PROCEDURE — 96366 THER/PROPH/DIAG IV INF ADDON: CPT

## 2020-01-01 PROCEDURE — 0FB03ZX EXCISION OF LIVER, PERCUTANEOUS APPROACH, DIAGNOSTIC: ICD-10-PCS | Performed by: RADIOLOGY

## 2020-01-01 PROCEDURE — G0269 OCCLUSIVE DEVICE IN VEIN ART: HCPCS

## 2020-01-01 PROCEDURE — 4040F PNEUMOC VAC/ADMIN/RCVD: CPT | Performed by: TRANSPLANT SURGERY

## 2020-01-01 PROCEDURE — 36246 INS CATH ABD/L-EXT ART 2ND: CPT

## 2020-01-01 PROCEDURE — P9040 RBC LEUKOREDUCED IRRADIATED: HCPCS

## 2020-01-01 PROCEDURE — 75726 ARTERY X-RAYS ABDOMEN: CPT

## 2020-01-01 PROCEDURE — 2709999900 IR EMBOLIZATION TUMOR/ORGAN ISCH/INFARC

## 2020-01-01 PROCEDURE — 36430 TRANSFUSION BLD/BLD COMPNT: CPT

## 2020-01-01 PROCEDURE — 75774 ARTERY X-RAY EACH VESSEL: CPT

## 2020-01-01 PROCEDURE — 3430000000 HC RX DIAGNOSTIC RADIOPHARMACEUTICAL: Performed by: RADIOLOGY

## 2020-01-01 PROCEDURE — 1036F TOBACCO NON-USER: CPT | Performed by: TRANSPLANT SURGERY

## 2020-01-01 PROCEDURE — 37243 VASC EMBOLIZE/OCCLUDE ORGAN: CPT | Performed by: RADIOLOGY

## 2020-01-01 PROCEDURE — 36248 INS CATH ABD/L-EXT ART ADDL: CPT | Performed by: RADIOLOGY

## 2020-01-01 PROCEDURE — 71046 X-RAY EXAM CHEST 2 VIEWS: CPT

## 2020-01-01 PROCEDURE — 2709999900 CT GUIDED NEEDLE PLACEMENT

## 2020-01-01 PROCEDURE — 99284 EMERGENCY DEPT VISIT MOD MDM: CPT

## 2020-01-01 PROCEDURE — 2500000003 HC RX 250 WO HCPCS: Performed by: RADIOLOGY

## 2020-01-01 PROCEDURE — 3609013000 HC EGD TRANSORAL CONTROL BLEEDING ANY METHOD: Performed by: INTERNAL MEDICINE

## 2020-01-01 PROCEDURE — 96365 THER/PROPH/DIAG IV INF INIT: CPT

## 2020-01-01 PROCEDURE — 80053 COMPREHEN METABOLIC PANEL: CPT

## 2020-01-01 PROCEDURE — 37243 VASC EMBOLIZE/OCCLUDE ORGAN: CPT

## 2020-01-01 PROCEDURE — 2580000003 HC RX 258: Performed by: RADIOLOGY

## 2020-01-01 PROCEDURE — 99212 OFFICE O/P EST SF 10 MIN: CPT | Performed by: TRANSPLANT SURGERY

## 2020-01-01 PROCEDURE — 74175 CTA ABDOMEN W/CONTRAST: CPT

## 2020-01-01 PROCEDURE — 87081 CULTURE SCREEN ONLY: CPT

## 2020-01-01 PROCEDURE — 78201 LIVER IMAGING STATIC ONLY: CPT | Performed by: RADIOLOGY

## 2020-01-01 PROCEDURE — 36245 INS CATH ABD/L-EXT ART 1ST: CPT | Performed by: RADIOLOGY

## 2020-01-01 PROCEDURE — U0003 INFECTIOUS AGENT DETECTION BY NUCLEIC ACID (DNA OR RNA); SEVERE ACUTE RESPIRATORY SYNDROME CORONAVIRUS 2 (SARS-COV-2) (CORONAVIRUS DISEASE [COVID-19]), AMPLIFIED PROBE TECHNIQUE, MAKING USE OF HIGH THROUGHPUT TECHNOLOGIES AS DESCRIBED BY CMS-2020-01-R: HCPCS

## 2020-01-01 PROCEDURE — 96375 TX/PRO/DX INJ NEW DRUG ADDON: CPT

## 2020-01-01 PROCEDURE — 86923 COMPATIBILITY TEST ELECTRIC: CPT

## 2020-01-01 PROCEDURE — 36247 INS CATH ABD/L-EXT ART 3RD: CPT

## 2020-01-01 PROCEDURE — 82105 ALPHA-FETOPROTEIN SERUM: CPT

## 2020-01-01 PROCEDURE — G0379 DIRECT REFER HOSPITAL OBSERV: HCPCS

## 2020-01-01 PROCEDURE — 76937 US GUIDE VASCULAR ACCESS: CPT | Performed by: RADIOLOGY

## 2020-01-01 PROCEDURE — 84484 ASSAY OF TROPONIN QUANT: CPT

## 2020-01-01 PROCEDURE — 99202 OFFICE O/P NEW SF 15 MIN: CPT | Performed by: TRANSPLANT SURGERY

## 2020-01-01 PROCEDURE — 6360000004 HC RX CONTRAST MEDICATION: Performed by: INTERNAL MEDICINE

## 2020-01-01 PROCEDURE — 71260 CT THORAX DX C+: CPT

## 2020-01-01 PROCEDURE — 3700000001 HC ADD 15 MINUTES (ANESTHESIA): Performed by: INTERNAL MEDICINE

## 2020-01-01 PROCEDURE — 36246 INS CATH ABD/L-EXT ART 2ND: CPT | Performed by: RADIOLOGY

## 2020-01-01 PROCEDURE — 99204 OFFICE O/P NEW MOD 45 MIN: CPT | Performed by: TRANSPLANT SURGERY

## 2020-01-01 PROCEDURE — U0002 COVID-19 LAB TEST NON-CDC: HCPCS

## 2020-01-01 PROCEDURE — 6360000002 HC RX W HCPCS: Performed by: CLINICAL NURSE SPECIALIST

## 2020-01-01 PROCEDURE — G0378 HOSPITAL OBSERVATION PER HR: HCPCS

## 2020-01-01 PROCEDURE — 36561 INSERT TUNNELED CV CATH: CPT

## 2020-01-01 PROCEDURE — 85014 HEMATOCRIT: CPT

## 2020-01-01 PROCEDURE — 88341 IMHCHEM/IMCYTCHM EA ADD ANTB: CPT

## 2020-01-01 PROCEDURE — 99213 OFFICE O/P EST LOW 20 MIN: CPT | Performed by: RADIOLOGY

## 2020-01-01 PROCEDURE — 86901 BLOOD TYPING SEROLOGIC RH(D): CPT

## 2020-01-01 PROCEDURE — 2580000003 HC RX 258: Performed by: CLINICAL NURSE SPECIALIST

## 2020-01-01 PROCEDURE — 2720000010 HC SURG SUPPLY STERILE: Performed by: INTERNAL MEDICINE

## 2020-01-01 PROCEDURE — 2580000003 HC RX 258: Performed by: NURSE ANESTHETIST, CERTIFIED REGISTERED

## 2020-01-01 PROCEDURE — 2580000003 HC RX 258: Performed by: EMERGENCY MEDICINE

## 2020-01-01 PROCEDURE — 7100000011 HC PHASE II RECOVERY - ADDTL 15 MIN

## 2020-01-01 PROCEDURE — 83690 ASSAY OF LIPASE: CPT

## 2020-01-01 PROCEDURE — 6360000002 HC RX W HCPCS: Performed by: NURSE ANESTHETIST, CERTIFIED REGISTERED

## 2020-01-01 PROCEDURE — 80074 ACUTE HEPATITIS PANEL: CPT

## 2020-01-01 PROCEDURE — 36247 INS CATH ABD/L-EXT ART 3RD: CPT | Performed by: RADIOLOGY

## 2020-01-01 PROCEDURE — 2580000003 HC RX 258: Performed by: HOSPITALIST

## 2020-01-01 PROCEDURE — 77001 FLUOROGUIDE FOR VEIN DEVICE: CPT

## 2020-01-01 PROCEDURE — 47000 NEEDLE BIOPSY OF LIVER PERQ: CPT

## 2020-01-01 PROCEDURE — 2500000003 HC RX 250 WO HCPCS: Performed by: STUDENT IN AN ORGANIZED HEALTH CARE EDUCATION/TRAINING PROGRAM

## 2020-01-01 PROCEDURE — P9016 RBC LEUKOCYTES REDUCED: HCPCS

## 2020-01-01 PROCEDURE — 85018 HEMOGLOBIN: CPT

## 2020-01-01 PROCEDURE — 7100000010 HC PHASE II RECOVERY - FIRST 15 MIN

## 2020-01-01 PROCEDURE — 96374 THER/PROPH/DIAG INJ IV PUSH: CPT

## 2020-01-01 PROCEDURE — 3700000000 HC ANESTHESIA ATTENDED CARE

## 2020-01-01 PROCEDURE — 0W3P8ZZ CONTROL BLEEDING IN GASTROINTESTINAL TRACT, VIA NATURAL OR ARTIFICIAL OPENING ENDOSCOPIC: ICD-10-PCS | Performed by: INTERNAL MEDICINE

## 2020-01-01 PROCEDURE — C1894 INTRO/SHEATH, NON-LASER: HCPCS

## 2020-01-01 PROCEDURE — 74178 CT ABD&PLV WO CNTR FLWD CNTR: CPT

## 2020-01-01 PROCEDURE — C9113 INJ PANTOPRAZOLE SODIUM, VIA: HCPCS | Performed by: HOSPITALIST

## 2020-01-01 PROCEDURE — 99291 CRITICAL CARE FIRST HOUR: CPT | Performed by: HOSPITALIST

## 2020-01-01 PROCEDURE — 36245 INS CATH ABD/L-EXT ART 1ST: CPT

## 2020-01-01 PROCEDURE — G8427 DOCREV CUR MEDS BY ELIG CLIN: HCPCS | Performed by: TRANSPLANT SURGERY

## 2020-01-01 PROCEDURE — 79445 NUCLEAR RX INTRA-ARTERIAL: CPT | Performed by: RADIOLOGY

## 2020-01-01 PROCEDURE — P9037 PLATE PHERES LEUKOREDU IRRAD: HCPCS

## 2020-01-01 PROCEDURE — 74175 CTA ABDOMEN W/CONTRAST: CPT | Performed by: RADIOLOGY

## 2020-01-01 PROCEDURE — 6360000002 HC RX W HCPCS: Performed by: HOSPITALIST

## 2020-01-01 RX ORDER — FENTANYL CITRATE 50 UG/ML
INJECTION, SOLUTION INTRAMUSCULAR; INTRAVENOUS PRN
Status: DISCONTINUED | OUTPATIENT
Start: 2020-01-01 | End: 2020-01-01 | Stop reason: SDUPTHER

## 2020-01-01 RX ORDER — LORAZEPAM 2 MG/ML
1 INJECTION INTRAMUSCULAR
Status: DISCONTINUED | OUTPATIENT
Start: 2020-01-01 | End: 2020-01-01 | Stop reason: HOSPADM

## 2020-01-01 RX ORDER — SODIUM CHLORIDE 9 MG/ML
INJECTION, SOLUTION INTRAVENOUS CONTINUOUS PRN
Status: DISCONTINUED | OUTPATIENT
Start: 2020-01-01 | End: 2020-01-01 | Stop reason: SDUPTHER

## 2020-01-01 RX ORDER — LANOLIN ALCOHOL/MO/W.PET/CERES
100 CREAM (GRAM) TOPICAL DAILY
Qty: 30 TABLET | Refills: 3 | Status: SHIPPED | OUTPATIENT
Start: 2020-01-01 | End: 2020-01-01 | Stop reason: ALTCHOICE

## 2020-01-01 RX ORDER — HEPARIN SODIUM 10000 [USP'U]/ML
INJECTION, SOLUTION INTRAVENOUS; SUBCUTANEOUS
Status: COMPLETED | OUTPATIENT
Start: 2020-01-01 | End: 2020-01-01

## 2020-01-01 RX ORDER — SODIUM CHLORIDE, SODIUM LACTATE, POTASSIUM CHLORIDE, CALCIUM CHLORIDE 600; 310; 30; 20 MG/100ML; MG/100ML; MG/100ML; MG/100ML
INJECTION, SOLUTION INTRAVENOUS CONTINUOUS
Status: DISCONTINUED | OUTPATIENT
Start: 2020-01-01 | End: 2020-01-01

## 2020-01-01 RX ORDER — MIDAZOLAM HYDROCHLORIDE 1 MG/ML
1 INJECTION INTRAMUSCULAR; INTRAVENOUS ONCE
Status: COMPLETED | OUTPATIENT
Start: 2020-01-01 | End: 2020-01-01

## 2020-01-01 RX ORDER — ONDANSETRON 2 MG/ML
8 INJECTION INTRAMUSCULAR; INTRAVENOUS EVERY 8 HOURS
Status: DISCONTINUED | OUTPATIENT
Start: 2020-01-01 | End: 2020-01-01 | Stop reason: HOSPADM

## 2020-01-01 RX ORDER — FOLIC ACID 1 MG/1
1 TABLET ORAL DAILY
Status: DISCONTINUED | OUTPATIENT
Start: 2020-01-01 | End: 2020-01-01 | Stop reason: HOSPADM

## 2020-01-01 RX ORDER — CEFTRIAXONE 1 G/1
1 INJECTION, POWDER, FOR SOLUTION INTRAMUSCULAR; INTRAVENOUS DAILY
Status: DISCONTINUED | OUTPATIENT
Start: 2020-01-01 | End: 2020-01-01 | Stop reason: SDUPTHER

## 2020-01-01 RX ORDER — ONDANSETRON 2 MG/ML
12 INJECTION INTRAMUSCULAR; INTRAVENOUS ONCE
Status: COMPLETED | OUTPATIENT
Start: 2020-01-01 | End: 2020-01-01

## 2020-01-01 RX ORDER — OXYCODONE HYDROCHLORIDE AND ACETAMINOPHEN 5; 325 MG/1; MG/1
1 TABLET ORAL EVERY 4 HOURS PRN
Status: DISCONTINUED | OUTPATIENT
Start: 2020-01-01 | End: 2020-01-01 | Stop reason: HOSPADM

## 2020-01-01 RX ORDER — NADOLOL 40 MG/1
40 TABLET ORAL DAILY
Qty: 30 TABLET | Refills: 0 | Status: SHIPPED | OUTPATIENT
Start: 2020-01-01 | End: 2020-01-01 | Stop reason: SDUPTHER

## 2020-01-01 RX ORDER — DEXTROSE AND SODIUM CHLORIDE 5; .45 G/100ML; G/100ML
INJECTION, SOLUTION INTRAVENOUS CONTINUOUS
Status: DISCONTINUED | OUTPATIENT
Start: 2020-01-01 | End: 2020-01-01 | Stop reason: HOSPADM

## 2020-01-01 RX ORDER — 0.9 % SODIUM CHLORIDE 0.9 %
20 INTRAVENOUS SOLUTION INTRAVENOUS ONCE
Status: DISCONTINUED | OUTPATIENT
Start: 2020-01-01 | End: 2020-01-01

## 2020-01-01 RX ORDER — MORPHINE SULFATE 2 MG/ML
2 INJECTION, SOLUTION INTRAMUSCULAR; INTRAVENOUS
Status: DISCONTINUED | OUTPATIENT
Start: 2020-01-01 | End: 2020-01-01 | Stop reason: HOSPADM

## 2020-01-01 RX ORDER — THIAMINE HYDROCHLORIDE 100 MG/ML
100 INJECTION, SOLUTION INTRAMUSCULAR; INTRAVENOUS DAILY
Status: DISCONTINUED | OUTPATIENT
Start: 2020-01-01 | End: 2020-01-01

## 2020-01-01 RX ORDER — SODIUM CHLORIDE 9 MG/ML
10 INJECTION INTRAVENOUS DAILY
Status: DISCONTINUED | OUTPATIENT
Start: 2020-01-01 | End: 2020-01-01

## 2020-01-01 RX ORDER — 0.9 % SODIUM CHLORIDE 0.9 %
20 INTRAVENOUS SOLUTION INTRAVENOUS ONCE
Status: COMPLETED | OUTPATIENT
Start: 2020-01-01 | End: 2020-01-01

## 2020-01-01 RX ORDER — FOLIC ACID 1 MG/1
1 TABLET ORAL DAILY
Qty: 30 TABLET | Refills: 0 | Status: SHIPPED | OUTPATIENT
Start: 2020-01-01 | End: 2020-01-01 | Stop reason: ALTCHOICE

## 2020-01-01 RX ORDER — PROPOFOL 10 MG/ML
INJECTION, EMULSION INTRAVENOUS CONTINUOUS PRN
Status: DISCONTINUED | OUTPATIENT
Start: 2020-01-01 | End: 2020-01-01 | Stop reason: SDUPTHER

## 2020-01-01 RX ORDER — ONDANSETRON 2 MG/ML
4 INJECTION INTRAMUSCULAR; INTRAVENOUS EVERY 6 HOURS PRN
Status: DISCONTINUED | OUTPATIENT
Start: 2020-01-01 | End: 2020-01-01 | Stop reason: HOSPADM

## 2020-01-01 RX ORDER — OXYCODONE HYDROCHLORIDE AND ACETAMINOPHEN 5; 325 MG/1; MG/1
2 TABLET ORAL EVERY 4 HOURS PRN
Status: DISCONTINUED | OUTPATIENT
Start: 2020-01-01 | End: 2020-01-01 | Stop reason: HOSPADM

## 2020-01-01 RX ORDER — 0.9 % SODIUM CHLORIDE 0.9 %
250 INTRAVENOUS SOLUTION INTRAVENOUS ONCE
Status: COMPLETED | OUTPATIENT
Start: 2020-01-01 | End: 2020-01-01

## 2020-01-01 RX ORDER — SODIUM CHLORIDE 0.9 % (FLUSH) 0.9 %
10 SYRINGE (ML) INJECTION EVERY 12 HOURS SCHEDULED
Status: DISCONTINUED | OUTPATIENT
Start: 2020-01-01 | End: 2020-01-01 | Stop reason: HOSPADM

## 2020-01-01 RX ORDER — LORAZEPAM 2 MG/ML
4 INJECTION INTRAMUSCULAR
Status: DISCONTINUED | OUTPATIENT
Start: 2020-01-01 | End: 2020-01-01 | Stop reason: HOSPADM

## 2020-01-01 RX ORDER — ACETAMINOPHEN 325 MG/1
650 TABLET ORAL EVERY 4 HOURS PRN
Status: DISCONTINUED | OUTPATIENT
Start: 2020-01-01 | End: 2020-01-01 | Stop reason: HOSPADM

## 2020-01-01 RX ORDER — SODIUM CHLORIDE 9 MG/ML
INJECTION, SOLUTION INTRAVENOUS CONTINUOUS
Status: DISCONTINUED | OUTPATIENT
Start: 2020-01-01 | End: 2020-01-01 | Stop reason: SDUPTHER

## 2020-01-01 RX ORDER — FOLIC ACID 1 MG/1
1 TABLET ORAL DAILY
Qty: 30 TABLET | Refills: 0 | Status: SHIPPED | OUTPATIENT
Start: 2020-01-01 | End: 2020-01-01

## 2020-01-01 RX ORDER — MIDAZOLAM HYDROCHLORIDE 1 MG/ML
INJECTION INTRAMUSCULAR; INTRAVENOUS
Status: COMPLETED | OUTPATIENT
Start: 2020-01-01 | End: 2020-01-01

## 2020-01-01 RX ORDER — PROMETHAZINE HYDROCHLORIDE 25 MG/1
12.5 TABLET ORAL EVERY 6 HOURS PRN
Status: DISCONTINUED | OUTPATIENT
Start: 2020-01-01 | End: 2020-01-01 | Stop reason: HOSPADM

## 2020-01-01 RX ORDER — DIPHENHYDRAMINE HYDROCHLORIDE 50 MG/ML
50 INJECTION INTRAMUSCULAR; INTRAVENOUS ONCE
Status: COMPLETED | OUTPATIENT
Start: 2020-01-01 | End: 2020-01-01

## 2020-01-01 RX ORDER — SODIUM CHLORIDE 0.9 % (FLUSH) 0.9 %
10 SYRINGE (ML) INJECTION PRN
Status: DISCONTINUED | OUTPATIENT
Start: 2020-01-01 | End: 2020-01-01

## 2020-01-01 RX ORDER — FENTANYL CITRATE 50 UG/ML
INJECTION, SOLUTION INTRAMUSCULAR; INTRAVENOUS
Status: COMPLETED | OUTPATIENT
Start: 2020-01-01 | End: 2020-01-01

## 2020-01-01 RX ORDER — PANTOPRAZOLE SODIUM 40 MG/10ML
40 INJECTION, POWDER, LYOPHILIZED, FOR SOLUTION INTRAVENOUS 2 TIMES DAILY
Status: DISCONTINUED | OUTPATIENT
Start: 2020-01-01 | End: 2020-01-01

## 2020-01-01 RX ORDER — PANTOPRAZOLE SODIUM 40 MG/1
40 TABLET, DELAYED RELEASE ORAL DAILY
COMMUNITY
End: 2021-01-01 | Stop reason: ALTCHOICE

## 2020-01-01 RX ORDER — THIAMINE HYDROCHLORIDE 100 MG/ML
100 INJECTION, SOLUTION INTRAMUSCULAR; INTRAVENOUS ONCE
Status: COMPLETED | OUTPATIENT
Start: 2020-01-01 | End: 2020-01-01

## 2020-01-01 RX ORDER — SODIUM CHLORIDE 0.9 % (FLUSH) 0.9 %
10 SYRINGE (ML) INJECTION EVERY 12 HOURS SCHEDULED
Status: DISCONTINUED | OUTPATIENT
Start: 2020-01-01 | End: 2020-01-01

## 2020-01-01 RX ORDER — SODIUM CHLORIDE 9 MG/ML
INJECTION, SOLUTION INTRAVENOUS CONTINUOUS
Status: ACTIVE | OUTPATIENT
Start: 2020-01-01 | End: 2020-01-01

## 2020-01-01 RX ORDER — PANTOPRAZOLE SODIUM 40 MG/10ML
40 INJECTION, POWDER, LYOPHILIZED, FOR SOLUTION INTRAVENOUS ONCE
Status: COMPLETED | OUTPATIENT
Start: 2020-01-01 | End: 2020-01-01

## 2020-01-01 RX ORDER — LIDOCAINE HYDROCHLORIDE 20 MG/ML
INJECTION, SOLUTION INFILTRATION; PERINEURAL
Status: COMPLETED | OUTPATIENT
Start: 2020-01-01 | End: 2020-01-01

## 2020-01-01 RX ORDER — DICYCLOMINE HYDROCHLORIDE 10 MG/1
10 CAPSULE ORAL 2 TIMES DAILY
COMMUNITY
End: 2021-01-01 | Stop reason: ALTCHOICE

## 2020-01-01 RX ORDER — SODIUM CHLORIDE 9 MG/ML
10 INJECTION INTRAVENOUS 2 TIMES DAILY
Status: DISCONTINUED | OUTPATIENT
Start: 2020-01-01 | End: 2020-01-01 | Stop reason: HOSPADM

## 2020-01-01 RX ORDER — SUCRALFATE 1 G/1
1 TABLET ORAL 4 TIMES DAILY
Qty: 120 TABLET | Refills: 0 | Status: SHIPPED | OUTPATIENT
Start: 2020-01-01 | End: 2021-01-01

## 2020-01-01 RX ORDER — CEFAZOLIN SODIUM 1 G/3ML
INJECTION, POWDER, FOR SOLUTION INTRAMUSCULAR; INTRAVENOUS PRN
Status: DISCONTINUED | OUTPATIENT
Start: 2020-01-01 | End: 2020-01-01 | Stop reason: SDUPTHER

## 2020-01-01 RX ORDER — PANTOPRAZOLE SODIUM 40 MG/10ML
40 INJECTION, POWDER, LYOPHILIZED, FOR SOLUTION INTRAVENOUS 2 TIMES DAILY
Status: DISCONTINUED | OUTPATIENT
Start: 2020-01-01 | End: 2020-01-01 | Stop reason: HOSPADM

## 2020-01-01 RX ORDER — LIDOCAINE HYDROCHLORIDE 20 MG/ML
10 INJECTION, SOLUTION INFILTRATION; PERINEURAL ONCE
Status: COMPLETED | OUTPATIENT
Start: 2020-01-01 | End: 2020-01-01

## 2020-01-01 RX ORDER — LORAZEPAM 1 MG/1
1 TABLET ORAL
Status: DISCONTINUED | OUTPATIENT
Start: 2020-01-01 | End: 2020-01-01 | Stop reason: HOSPADM

## 2020-01-01 RX ORDER — ACETAMINOPHEN 325 MG/1
650 TABLET ORAL EVERY 6 HOURS PRN
Status: DISCONTINUED | OUTPATIENT
Start: 2020-01-01 | End: 2020-01-01 | Stop reason: HOSPADM

## 2020-01-01 RX ORDER — LORAZEPAM 2 MG/ML
3 INJECTION INTRAMUSCULAR
Status: DISCONTINUED | OUTPATIENT
Start: 2020-01-01 | End: 2020-01-01 | Stop reason: HOSPADM

## 2020-01-01 RX ORDER — PROPOFOL 10 MG/ML
INJECTION, EMULSION INTRAVENOUS PRN
Status: DISCONTINUED | OUTPATIENT
Start: 2020-01-01 | End: 2020-01-01 | Stop reason: SDUPTHER

## 2020-01-01 RX ORDER — LORAZEPAM 2 MG/ML
2 INJECTION INTRAMUSCULAR
Status: DISCONTINUED | OUTPATIENT
Start: 2020-01-01 | End: 2020-01-01 | Stop reason: HOSPADM

## 2020-01-01 RX ORDER — NADOLOL 40 MG/1
40 TABLET ORAL DAILY
Qty: 30 TABLET | Refills: 0 | Status: SHIPPED | OUTPATIENT
Start: 2020-01-01 | End: 2020-01-01 | Stop reason: ALTCHOICE

## 2020-01-01 RX ORDER — DEXAMETHASONE SODIUM PHOSPHATE 4 MG/ML
10 INJECTION, SOLUTION INTRA-ARTICULAR; INTRALESIONAL; INTRAMUSCULAR; INTRAVENOUS; SOFT TISSUE ONCE
Status: COMPLETED | OUTPATIENT
Start: 2020-01-01 | End: 2020-01-01

## 2020-01-01 RX ORDER — LANOLIN ALCOHOL/MO/W.PET/CERES
325 CREAM (GRAM) TOPICAL 2 TIMES DAILY
Qty: 60 TABLET | Refills: 0 | Status: ON HOLD | OUTPATIENT
Start: 2020-01-01 | End: 2020-01-01

## 2020-01-01 RX ORDER — OMEPRAZOLE 20 MG/1
40 CAPSULE, DELAYED RELEASE ORAL DAILY
Status: ON HOLD | COMMUNITY
End: 2020-01-01 | Stop reason: HOSPADM

## 2020-01-01 RX ORDER — LANOLIN ALCOHOL/MO/W.PET/CERES
100 CREAM (GRAM) TOPICAL DAILY
Qty: 30 TABLET | Refills: 3 | Status: SHIPPED | OUTPATIENT
Start: 2020-01-01 | End: 2020-01-01

## 2020-01-01 RX ORDER — SODIUM CHLORIDE 0.9 % (FLUSH) 0.9 %
10 SYRINGE (ML) INJECTION
Status: COMPLETED | OUTPATIENT
Start: 2020-01-01 | End: 2020-01-01

## 2020-01-01 RX ORDER — LORAZEPAM 1 MG/1
2 TABLET ORAL
Status: DISCONTINUED | OUTPATIENT
Start: 2020-01-01 | End: 2020-01-01 | Stop reason: HOSPADM

## 2020-01-01 RX ORDER — FOLIC ACID 5 MG/ML
1 INJECTION, SOLUTION INTRAMUSCULAR; INTRAVENOUS; SUBCUTANEOUS DAILY
Status: DISCONTINUED | OUTPATIENT
Start: 2020-01-01 | End: 2020-01-01

## 2020-01-01 RX ORDER — PROCHLORPERAZINE 25 MG
25 SUPPOSITORY, RECTAL RECTAL EVERY 8 HOURS PRN
Status: DISCONTINUED | OUTPATIENT
Start: 2020-01-01 | End: 2020-01-01 | Stop reason: HOSPADM

## 2020-01-01 RX ORDER — LORAZEPAM 1 MG/1
4 TABLET ORAL
Status: DISCONTINUED | OUTPATIENT
Start: 2020-01-01 | End: 2020-01-01 | Stop reason: HOSPADM

## 2020-01-01 RX ORDER — DEXAMETHASONE SODIUM PHOSPHATE 4 MG/ML
8 INJECTION, SOLUTION INTRA-ARTICULAR; INTRALESIONAL; INTRAMUSCULAR; INTRAVENOUS; SOFT TISSUE EVERY 8 HOURS
Status: DISCONTINUED | OUTPATIENT
Start: 2020-01-01 | End: 2020-01-01 | Stop reason: HOSPADM

## 2020-01-01 RX ORDER — ACETAMINOPHEN 650 MG/1
650 SUPPOSITORY RECTAL EVERY 6 HOURS PRN
Status: DISCONTINUED | OUTPATIENT
Start: 2020-01-01 | End: 2020-01-01 | Stop reason: HOSPADM

## 2020-01-01 RX ORDER — FENTANYL CITRATE 50 UG/ML
50 INJECTION, SOLUTION INTRAMUSCULAR; INTRAVENOUS ONCE
Status: COMPLETED | OUTPATIENT
Start: 2020-01-01 | End: 2020-01-01

## 2020-01-01 RX ORDER — THIAMINE MONONITRATE (VIT B1) 100 MG
100 TABLET ORAL DAILY
Status: DISCONTINUED | OUTPATIENT
Start: 2020-01-01 | End: 2020-01-01 | Stop reason: HOSPADM

## 2020-01-01 RX ORDER — FOLIC ACID 5 MG/ML
1 INJECTION, SOLUTION INTRAMUSCULAR; INTRAVENOUS; SUBCUTANEOUS ONCE
Status: COMPLETED | OUTPATIENT
Start: 2020-01-01 | End: 2020-01-01

## 2020-01-01 RX ORDER — SODIUM CHLORIDE 0.9 % (FLUSH) 0.9 %
10 SYRINGE (ML) INJECTION PRN
Status: DISCONTINUED | OUTPATIENT
Start: 2020-01-01 | End: 2020-01-01 | Stop reason: HOSPADM

## 2020-01-01 RX ORDER — LORAZEPAM 1 MG/1
3 TABLET ORAL
Status: DISCONTINUED | OUTPATIENT
Start: 2020-01-01 | End: 2020-01-01 | Stop reason: HOSPADM

## 2020-01-01 RX ORDER — CIPROFLOXACIN 2 MG/ML
400 INJECTION, SOLUTION INTRAVENOUS ONCE
Status: CANCELLED | OUTPATIENT
Start: 2020-01-01

## 2020-01-01 RX ORDER — SODIUM CHLORIDE 9 MG/ML
INJECTION, SOLUTION INTRAVENOUS CONTINUOUS
Status: DISCONTINUED | OUTPATIENT
Start: 2020-01-01 | End: 2020-01-01

## 2020-01-01 RX ORDER — MIDAZOLAM HYDROCHLORIDE 1 MG/ML
INJECTION INTRAMUSCULAR; INTRAVENOUS PRN
Status: DISCONTINUED | OUTPATIENT
Start: 2020-01-01 | End: 2020-01-01 | Stop reason: SDUPTHER

## 2020-01-01 RX ORDER — POLYETHYLENE GLYCOL 3350 17 G/17G
17 POWDER, FOR SOLUTION ORAL DAILY PRN
Status: DISCONTINUED | OUTPATIENT
Start: 2020-01-01 | End: 2020-01-01 | Stop reason: HOSPADM

## 2020-01-01 RX ADMIN — Medication 10 ML: at 20:09

## 2020-01-01 RX ADMIN — Medication 5000 UNITS: at 12:16

## 2020-01-01 RX ADMIN — CEFAZOLIN 500 MG: 1 INJECTION, POWDER, FOR SOLUTION INTRAMUSCULAR; INTRAVENOUS at 19:21

## 2020-01-01 RX ADMIN — FOLIC ACID 1 MG: 5 INJECTION, SOLUTION INTRAMUSCULAR; INTRAVENOUS; SUBCUTANEOUS at 08:49

## 2020-01-01 RX ADMIN — PANTOPRAZOLE SODIUM 40 MG: 40 INJECTION, POWDER, FOR SOLUTION INTRAVENOUS at 20:39

## 2020-01-01 RX ADMIN — LIDOCAINE HYDROCHLORIDE 18 ML: 20 INJECTION, SOLUTION INFILTRATION; PERINEURAL at 12:42

## 2020-01-01 RX ADMIN — SODIUM CHLORIDE, POTASSIUM CHLORIDE, SODIUM LACTATE AND CALCIUM CHLORIDE: 600; 310; 30; 20 INJECTION, SOLUTION INTRAVENOUS at 06:22

## 2020-01-01 RX ADMIN — CEFTRIAXONE 2 G: 2 INJECTION, POWDER, FOR SOLUTION INTRAMUSCULAR; INTRAVENOUS at 19:06

## 2020-01-01 RX ADMIN — ONDANSETRON 8 MG: 2 INJECTION INTRAMUSCULAR; INTRAVENOUS at 06:54

## 2020-01-01 RX ADMIN — PROPOFOL 300 MG: 10 INJECTION, EMULSION INTRAVENOUS at 15:21

## 2020-01-01 RX ADMIN — PANTOPRAZOLE SODIUM 40 MG: 40 INJECTION, POWDER, FOR SOLUTION INTRAVENOUS at 22:14

## 2020-01-01 RX ADMIN — FENTANYL CITRATE 50 MCG: 50 INJECTION, SOLUTION INTRAMUSCULAR; INTRAVENOUS at 12:11

## 2020-01-01 RX ADMIN — FENTANYL CITRATE 25 MCG: 50 INJECTION, SOLUTION INTRAMUSCULAR; INTRAVENOUS at 11:19

## 2020-01-01 RX ADMIN — SODIUM CHLORIDE: 9 INJECTION, SOLUTION INTRAVENOUS at 16:38

## 2020-01-01 RX ADMIN — FENTANYL CITRATE 25 MCG: 50 INJECTION, SOLUTION INTRAMUSCULAR; INTRAVENOUS at 11:59

## 2020-01-01 RX ADMIN — Medication 5 MILLICURIE: at 13:25

## 2020-01-01 RX ADMIN — Medication 10 ML: at 20:10

## 2020-01-01 RX ADMIN — IOPAMIDOL 90 ML: 755 INJECTION, SOLUTION INTRAVENOUS at 09:40

## 2020-01-01 RX ADMIN — SODIUM CHLORIDE, POTASSIUM CHLORIDE, SODIUM LACTATE AND CALCIUM CHLORIDE: 600; 310; 30; 20 INJECTION, SOLUTION INTRAVENOUS at 14:16

## 2020-01-01 RX ADMIN — SODIUM CHLORIDE 8 MG/HR: 9 INJECTION, SOLUTION INTRAVENOUS at 08:03

## 2020-01-01 RX ADMIN — FENTANYL CITRATE 50 MCG: 50 INJECTION, SOLUTION INTRAMUSCULAR; INTRAVENOUS at 14:51

## 2020-01-01 RX ADMIN — PROPOFOL 20 MG: 10 INJECTION, EMULSION INTRAVENOUS at 11:35

## 2020-01-01 RX ADMIN — SODIUM CHLORIDE, PRESERVATIVE FREE 10 ML: 5 INJECTION INTRAVENOUS at 08:48

## 2020-01-01 RX ADMIN — MIDAZOLAM 1 MG: 1 INJECTION INTRAMUSCULAR; INTRAVENOUS at 12:11

## 2020-01-01 RX ADMIN — CEFAZOLIN 1 G: 1 INJECTION, POWDER, FOR SOLUTION INTRAMUSCULAR; INTRAVENOUS at 09:23

## 2020-01-01 RX ADMIN — PANTOPRAZOLE SODIUM 40 MG: 40 INJECTION, POWDER, FOR SOLUTION INTRAVENOUS at 10:00

## 2020-01-01 RX ADMIN — OCTREOTIDE ACETATE 50 MCG/HR: 500 INJECTION, SOLUTION INTRAVENOUS; SUBCUTANEOUS at 13:14

## 2020-01-01 RX ADMIN — SODIUM CHLORIDE 20 ML: 9 INJECTION, SOLUTION INTRAVENOUS at 03:00

## 2020-01-01 RX ADMIN — SODIUM CHLORIDE, PRESERVATIVE FREE 10 ML: 5 INJECTION INTRAVENOUS at 20:09

## 2020-01-01 RX ADMIN — SODIUM CHLORIDE, PRESERVATIVE FREE 10 ML: 5 INJECTION INTRAVENOUS at 08:38

## 2020-01-01 RX ADMIN — SODIUM CHLORIDE: 9 INJECTION, SOLUTION INTRAVENOUS at 00:53

## 2020-01-01 RX ADMIN — DEXAMETHASONE SODIUM PHOSPHATE 10 MG: 4 INJECTION, SOLUTION INTRA-ARTICULAR; INTRALESIONAL; INTRAMUSCULAR; INTRAVENOUS; SOFT TISSUE at 09:29

## 2020-01-01 RX ADMIN — DIPHENHYDRAMINE HYDROCHLORIDE 50 MG: 50 INJECTION, SOLUTION INTRAMUSCULAR; INTRAVENOUS at 09:29

## 2020-01-01 RX ADMIN — CEFAZOLIN 2000 MG: 1 INJECTION, POWDER, FOR SOLUTION INTRAMUSCULAR; INTRAVENOUS at 12:53

## 2020-01-01 RX ADMIN — Medication 10 ML: at 21:50

## 2020-01-01 RX ADMIN — FOLIC ACID 1 MG: 5 INJECTION, SOLUTION INTRAMUSCULAR; INTRAVENOUS; SUBCUTANEOUS at 08:17

## 2020-01-01 RX ADMIN — CEFTRIAXONE 1 G: 1 INJECTION, POWDER, FOR SOLUTION INTRAMUSCULAR; INTRAVENOUS at 18:53

## 2020-01-01 RX ADMIN — THIAMINE HYDROCHLORIDE 100 MG: 100 INJECTION, SOLUTION INTRAMUSCULAR; INTRAVENOUS at 08:47

## 2020-01-01 RX ADMIN — OCTREOTIDE ACETATE 25 MCG/HR: 500 INJECTION, SOLUTION INTRAVENOUS; SUBCUTANEOUS at 19:34

## 2020-01-01 RX ADMIN — CEFAZOLIN 500 MG: 1 INJECTION, POWDER, FOR SOLUTION INTRAMUSCULAR; INTRAVENOUS at 02:09

## 2020-01-01 RX ADMIN — IOPAMIDOL 75 ML: 755 INJECTION, SOLUTION INTRAVENOUS at 16:54

## 2020-01-01 RX ADMIN — PROPOFOL 30 MG: 10 INJECTION, EMULSION INTRAVENOUS at 11:31

## 2020-01-01 RX ADMIN — METRONIDAZOLE 500 MG: 500 INJECTION, SOLUTION INTRAVENOUS at 22:56

## 2020-01-01 RX ADMIN — LORAZEPAM 2 MG: 1 TABLET ORAL at 22:18

## 2020-01-01 RX ADMIN — SODIUM CHLORIDE: 9 INJECTION, SOLUTION INTRAVENOUS at 15:11

## 2020-01-01 RX ADMIN — Medication 10 ML: at 22:14

## 2020-01-01 RX ADMIN — FOLIC ACID 1 MG: 1 TABLET ORAL at 10:00

## 2020-01-01 RX ADMIN — FOLIC ACID 1 MG: 1 TABLET ORAL at 08:38

## 2020-01-01 RX ADMIN — Medication 10 ML: at 09:40

## 2020-01-01 RX ADMIN — SODIUM CHLORIDE, POTASSIUM CHLORIDE, SODIUM LACTATE AND CALCIUM CHLORIDE 150 ML/HR: 600; 310; 30; 20 INJECTION, SOLUTION INTRAVENOUS at 21:47

## 2020-01-01 RX ADMIN — CEFTRIAXONE 1 G: 1 INJECTION, POWDER, FOR SOLUTION INTRAMUSCULAR; INTRAVENOUS at 18:40

## 2020-01-01 RX ADMIN — SODIUM CHLORIDE, PRESERVATIVE FREE 10 ML: 5 INJECTION INTRAVENOUS at 10:00

## 2020-01-01 RX ADMIN — FENTANYL CITRATE 25 MCG: 50 INJECTION, SOLUTION INTRAMUSCULAR; INTRAVENOUS at 11:28

## 2020-01-01 RX ADMIN — MIDAZOLAM HYDROCHLORIDE 1 MG: 1 INJECTION, SOLUTION INTRAMUSCULAR; INTRAVENOUS at 14:50

## 2020-01-01 RX ADMIN — SODIUM CHLORIDE, PRESERVATIVE FREE 10 ML: 5 INJECTION INTRAVENOUS at 20:39

## 2020-01-01 RX ADMIN — OCTREOTIDE ACETATE 50 MCG/HR: 500 INJECTION, SOLUTION INTRAVENOUS; SUBCUTANEOUS at 01:06

## 2020-01-01 RX ADMIN — Medication 100 MG: at 08:38

## 2020-01-01 RX ADMIN — PROPOFOL 30 MG: 10 INJECTION, EMULSION INTRAVENOUS at 11:27

## 2020-01-01 RX ADMIN — SODIUM CHLORIDE, PRESERVATIVE FREE 10 ML: 5 INJECTION INTRAVENOUS at 22:15

## 2020-01-01 RX ADMIN — Medication 100 MG: at 10:00

## 2020-01-01 RX ADMIN — CEFTRIAXONE 1 G: 1 INJECTION, POWDER, FOR SOLUTION INTRAMUSCULAR; INTRAVENOUS at 18:58

## 2020-01-01 RX ADMIN — LIDOCAINE HYDROCHLORIDE 4 ML: 20 INJECTION, SOLUTION INFILTRATION; PERINEURAL at 11:59

## 2020-01-01 RX ADMIN — PROPOFOL 125 MCG/KG/MIN: 10 INJECTION, EMULSION INTRAVENOUS at 11:27

## 2020-01-01 RX ADMIN — PANTOPRAZOLE SODIUM 40 MG: 40 INJECTION, POWDER, FOR SOLUTION INTRAVENOUS at 19:12

## 2020-01-01 RX ADMIN — SODIUM CHLORIDE, POTASSIUM CHLORIDE, SODIUM LACTATE AND CALCIUM CHLORIDE 75 ML/HR: 600; 310; 30; 20 INJECTION, SOLUTION INTRAVENOUS at 04:00

## 2020-01-01 RX ADMIN — Medication 45.08 MILLICURIE: at 12:33

## 2020-01-01 RX ADMIN — LIDOCAINE HYDROCHLORIDE: 20 INJECTION, SOLUTION INFILTRATION; PERINEURAL at 14:57

## 2020-01-01 RX ADMIN — Medication 20000 UNITS: at 12:00

## 2020-01-01 RX ADMIN — METRONIDAZOLE 500 MG: 500 INJECTION, SOLUTION INTRAVENOUS at 09:38

## 2020-01-01 RX ADMIN — Medication 10 ML: at 20:40

## 2020-01-01 RX ADMIN — DEXAMETHASONE SODIUM PHOSPHATE 8 MG: 4 INJECTION, SOLUTION INTRAMUSCULAR; INTRAVENOUS at 06:54

## 2020-01-01 RX ADMIN — Medication 10 ML: at 08:39

## 2020-01-01 RX ADMIN — PANTOPRAZOLE SODIUM 40 MG: 40 INJECTION, POWDER, FOR SOLUTION INTRAVENOUS at 20:09

## 2020-01-01 RX ADMIN — SODIUM CHLORIDE 250 ML: 9 INJECTION, SOLUTION INTRAVENOUS at 19:20

## 2020-01-01 RX ADMIN — SODIUM CHLORIDE 8 MG/HR: 9 INJECTION, SOLUTION INTRAVENOUS at 00:33

## 2020-01-01 RX ADMIN — SODIUM CHLORIDE: 9 INJECTION, SOLUTION INTRAVENOUS at 11:19

## 2020-01-01 RX ADMIN — PANTOPRAZOLE SODIUM 40 MG: 40 INJECTION, POWDER, FOR SOLUTION INTRAVENOUS at 08:38

## 2020-01-01 RX ADMIN — THIAMINE HYDROCHLORIDE 100 MG: 100 INJECTION, SOLUTION INTRAMUSCULAR; INTRAVENOUS at 08:17

## 2020-01-01 RX ADMIN — Medication 10 ML: at 09:00

## 2020-01-01 RX ADMIN — LIDOCAINE HYDROCHLORIDE 6 ML: 20 INJECTION, SOLUTION INFILTRATION; PERINEURAL at 12:13

## 2020-01-01 RX ADMIN — Medication 10 ML: at 08:17

## 2020-01-01 RX ADMIN — PANTOPRAZOLE SODIUM 40 MG: 40 INJECTION, POWDER, FOR SOLUTION INTRAVENOUS at 08:47

## 2020-01-01 RX ADMIN — IOVERSOL 50 ML: 678 INJECTION INTRA-ARTERIAL; INTRAVENOUS at 14:41

## 2020-01-01 RX ADMIN — SODIUM CHLORIDE: 9 INJECTION, SOLUTION INTRAVENOUS at 09:00

## 2020-01-01 RX ADMIN — ONDANSETRON 12 MG: 2 INJECTION INTRAMUSCULAR; INTRAVENOUS at 09:29

## 2020-01-01 RX ADMIN — LORAZEPAM 2 MG: 1 TABLET ORAL at 23:06

## 2020-01-01 RX ADMIN — FENTANYL CITRATE 25 MCG: 50 INJECTION, SOLUTION INTRAMUSCULAR; INTRAVENOUS at 12:27

## 2020-01-01 RX ADMIN — MIDAZOLAM 2 MG: 1 INJECTION INTRAMUSCULAR; INTRAVENOUS at 11:19

## 2020-01-01 SDOH — HEALTH STABILITY: MENTAL HEALTH: HOW OFTEN DO YOU HAVE A DRINK CONTAINING ALCOHOL?: 4 OR MORE TIMES A WEEK

## 2020-01-01 SDOH — HEALTH STABILITY: MENTAL HEALTH: HOW MANY STANDARD DRINKS CONTAINING ALCOHOL DO YOU HAVE ON A TYPICAL DAY?: 3 OR 4

## 2020-01-01 ASSESSMENT — PAIN SCALES - GENERAL
PAINLEVEL_OUTOF10: 0

## 2020-01-01 ASSESSMENT — ENCOUNTER SYMPTOMS
DIARRHEA: 0
SHORTNESS OF BREATH: 0
CONSTIPATION: 0
ABDOMINAL DISTENTION: 0
PHOTOPHOBIA: 0
VOMITING: 0
CHEST TIGHTNESS: 0
SHORTNESS OF BREATH: 0
BLOOD IN STOOL: 0
COUGH: 0
VOMITING: 1
BLOOD IN STOOL: 0
COLOR CHANGE: 0
BLOOD IN STOOL: 1
NAUSEA: 0
EYE DISCHARGE: 0
BACK PAIN: 0
CONSTIPATION: 0
EYE ITCHING: 0
SHORTNESS OF BREATH: 0
ABDOMINAL PAIN: 0
DIARRHEA: 0
SORE THROAT: 0
EYE DISCHARGE: 0
NAUSEA: 0
ABDOMINAL PAIN: 0
EYE PAIN: 0

## 2020-01-01 ASSESSMENT — PULMONARY FUNCTION TESTS
PIF_VALUE: 1
PIF_VALUE: 0

## 2020-01-01 ASSESSMENT — PAIN - FUNCTIONAL ASSESSMENT
PAIN_FUNCTIONAL_ASSESSMENT: 0-10
PAIN_FUNCTIONAL_ASSESSMENT: 0-10

## 2020-10-13 PROBLEM — Z87.19 H/O ESOPHAGEAL VARICES: Status: ACTIVE | Noted: 2020-01-01

## 2020-10-13 NOTE — ED PROVIDER NOTES
HPI:  10/13/20, Time: 6:37 PM EDT         Lauren Blanco is a 72 y.o. male presenting to the ED for Vomiting Bright red blood history of esophageal varices, beginning two days ago. The complaint has been persistent, moderate in severity, and worsened by nothing. Patient is a known history of esophageal varices sees Dr. Emmie Law in the past.  States he been commenting up of bright red blood started 2 days ago. Also reports of black stools. Does have a history of alcohol abuse and liver cirrhosis. He denies syncope fevers chills. He has no urinary complaints. He denies abdominal pain. He has no back pain. Is on no blood thinners. ROS:   Pertinent positives and negatives are stated within HPI, all other systems reviewed and are negative.  --------------------------------------------- PAST HISTORY ---------------------------------------------  Past Medical History:  has a past medical history of Cirrhosis ([facility] [address] Esophageal varices ([facility] [address] GI bleed, Hepatitis C, and Hypertension. Past Surgical History:  has a past surgical history that includes Leg Surgery; Endoscopy, colon, diagnostic (4/30/2013); Upper gastrointestinal endoscopy (09/02/2016); Upper gastrointestinal endoscopy (03/27/2018); and Upper gastrointestinal endoscopy (N/A, 3/27/2018). Social History:  reports that he has never smoked. He has never used smokeless tobacco. He reports current alcohol use. He reports that he does not use drugs. Family History: family history is not on file. The patients home medications have been reviewed. Allergies: Patient has no known allergies.     ---------------------------------------------------PHYSICAL EXAM--------------------------------------    Constitutional/General: Alert and oriented x3, well appearing, non toxic in NAD  Head: Normocephalic and atraumatic  Eyes: PERRL, EOMI  Mouth: Oropharynx clear, handling secretions, no trismus  Neck: Supple, full ROM, non tender to palpation in the midline, no stridor, no crepitus, no meningeal signs  Pulmonary: Lungs clear to auscultation bilaterally, no wheezes, rales, or rhonchi. Not in respiratory distress  Cardiovascular:  Regular rate. Regular rhythm. No murmurs, gallops, or rubs. 2+ distal pulses  Chest: no chest wall tenderness  Abdomen: Soft. Non tender. Non distended. +BS. No rebound, guarding, or rigidity. No pulsatile masses appreciated. Musculoskeletal: Moves all extremities x 4. Warm and well perfused, no clubbing, cyanosis, or edema. Capillary refill <3 seconds  Skin: warm and dry. No rashes. Neurologic: GCS 15, CN 2-12 grossly intact, no focal deficits, symmetric strength 5/5 in the upper and lower extremities bilaterally  Psych: Normal Affect    -------------------------------------------------- RESULTS -------------------------------------------------  I have personally reviewed all laboratory and imaging results for this patient. Results are listed below.      LABS:  Results for orders placed or performed during the hospital encounter of 10/13/20   CBC Auto Differential   Result Value Ref Range    WBC 5.1 4.5 - 11.5 E9/L    RBC 2.89 (L) 3.80 - 5.80 E12/L    Hemoglobin 6.3 (LL) 12.5 - 16.5 g/dL    Hematocrit 21.8 (L) 37.0 - 54.0 %    MCV 75.4 (L) 80.0 - 99.9 fL    MCH 21.8 (L) 26.0 - 35.0 pg    MCHC 28.9 (L) 32.0 - 34.5 %    RDW 21.9 (H) 11.5 - 15.0 fL    Platelets 828 (L) 080 - 450 E9/L    MPV 10.2 7.0 - 12.0 fL    Neutrophils % 76.8 43.0 - 80.0 %    Immature Granulocytes % 0.4 0.0 - 5.0 %    Lymphocytes % 13.6 (L) 20.0 - 42.0 %    Monocytes % 8.0 2.0 - 12.0 %    Eosinophils % 0.6 0.0 - 6.0 %    Basophils % 0.6 0.0 - 2.0 %    Neutrophils Absolute 3.94 1.80 - 7.30 E9/L    Immature Granulocytes # 0.02 E9/L    Lymphocytes Absolute 0.70 (L) 1.50 - 4.00 E9/L    Monocytes Absolute 0.41 0.10 - 0.95 E9/L    Eosinophils Absolute 0.03 (L) 0.05 - 0.50 E9/L    Basophils Absolute 0.03 0.00 - 0.20 E9/L    Anisocytosis 1+     Polychromasia 1+ Hypochromia 1+     Poikilocytes 1+     Ovalocytes 1+     Stomatocytes 1+     Target Cells 1+    Comprehensive Metabolic Panel w/ Reflex to MG   Result Value Ref Range    Sodium 135 132 - 146 mmol/L    Potassium reflex Magnesium 4.0 3.5 - 5.0 mmol/L    Chloride 106 98 - 107 mmol/L    CO2 24 22 - 29 mmol/L    Anion Gap 5 (L) 7 - 16 mmol/L    Glucose 104 (H) 74 - 99 mg/dL    BUN 21 8 - 23 mg/dL    CREATININE 0.8 0.7 - 1.2 mg/dL    GFR Non-African American >60 >=60 mL/min/1.73    GFR African American >60     Calcium 9.1 8.6 - 10.2 mg/dL    Total Protein 7.3 6.4 - 8.3 g/dL    Alb 3.3 (L) 3.5 - 5.2 g/dL    Total Bilirubin 1.7 (H) 0.0 - 1.2 mg/dL    Alkaline Phosphatase 66 40 - 129 U/L    ALT 24 0 - 40 U/L    AST 41 (H) 0 - 39 U/L   Lactic Acid, Plasma   Result Value Ref Range    Lactic Acid 2.1 0.5 - 2.2 mmol/L   Lipase   Result Value Ref Range    Lipase 29 13 - 60 U/L   Troponin   Result Value Ref Range    Troponin <0.01 0.00 - 0.03 ng/mL   APTT   Result Value Ref Range    aPTT 32.9 24.5 - 35.1 sec   Protime-INR   Result Value Ref Range    Protime 15.3 (H) 9.3 - 12.4 sec    INR 1.3    CBC WITH AUTO DIFFERENTIAL   Result Value Ref Range    WBC 1.8 (L) 4.5 - 11.5 E9/L    RBC 2.98 (L) 3.80 - 5.80 E12/L    Hemoglobin 7.1 (L) 12.5 - 16.5 g/dL    Hematocrit 23.3 (L) 37.0 - 54.0 %    MCV 78.2 (L) 80.0 - 99.9 fL    MCH 23.8 (L) 26.0 - 35.0 pg    MCHC 30.5 (L) 32.0 - 34.5 %    RDW 21.9 (H) 11.5 - 15.0 fL    Platelets 52 (L) 491 - 450 E9/L    MPV NOT CALC 7.0 - 12.0 fL    Neutrophils % 59.1 43.0 - 80.0 %    Immature Granulocytes % 0.6 0.0 - 5.0 %    Lymphocytes % 27.6 20.0 - 42.0 %    Monocytes % 10.5 2.0 - 12.0 %    Eosinophils % 1.1 0.0 - 6.0 %    Basophils % 1.1 0.0 - 2.0 %    Neutrophils Absolute 1.07 (L) 1.80 - 7.30 E9/L    Immature Granulocytes # 0.01 E9/L    Lymphocytes Absolute 0.50 (L) 1.50 - 4.00 E9/L    Monocytes Absolute 0.19 0.10 - 0.95 E9/L    Eosinophils Absolute 0.02 (L) 0.05 - 0.50 E9/L    Basophils Absolute 0.02 0.00 - 0.20 E9/L    Anisocytosis 1+     Hypochromia 1+    Basic metabolic panel   Result Value Ref Range    Sodium 136 132 - 146 mmol/L    Potassium 4.3 3.5 - 5.0 mmol/L    Chloride 106 98 - 107 mmol/L    CO2 24 22 - 29 mmol/L    Anion Gap 6 (L) 7 - 16 mmol/L    Glucose 102 (H) 74 - 99 mg/dL    BUN 25 (H) 8 - 23 mg/dL    CREATININE 1.0 0.7 - 1.2 mg/dL    GFR Non-African American >60 >=60 mL/min/1.73    GFR African American >60     Calcium 8.5 (L) 8.6 - 10.2 mg/dL   Comprehensive Metabolic Panel w/ Reflex to MG   Result Value Ref Range    Potassium reflex Magnesium 4.3 3.5 - 5.0 mmol/L    Total Protein 6.0 (L) 6.4 - 8.3 g/dL    Alb 2.8 (L) 3.5 - 5.2 g/dL    Total Bilirubin 1.5 (H) 0.0 - 1.2 mg/dL    Alkaline Phosphatase 54 40 - 129 U/L    ALT 6 0 - 40 U/L    AST 42 (H) 0 - 39 U/L   Hemoglobin and hematocrit, blood   Result Value Ref Range    Hemoglobin 6.5 (L) 12.5 - 16.5 g/dL    Hematocrit 21.2 (L) 37.0 - 54.0 %   Lactic acid, plasma   Result Value Ref Range    Lactic Acid 1.5 0.5 - 2.2 mmol/L   Platelet Confirmation   Result Value Ref Range    Platelet Confirmation CONFIRMED    PROTIME-INR   Result Value Ref Range    Protime 16.3 (H) 9.3 - 12.4 sec    INR 1.4    COVID-19   Result Value Ref Range    SARS-CoV-2, NAAT Not Detected Not Detected   EKG 12 Lead   Result Value Ref Range    Ventricular Rate 103 BPM    Atrial Rate 103 BPM    P-R Interval 176 ms    QRS Duration 74 ms    Q-T Interval 362 ms    QTc Calculation (Bazett) 474 ms    P Axis 63 degrees    R Axis 42 degrees    T Axis 3 degrees   TYPE AND SCREEN   Result Value Ref Range    ABO/Rh A POS     Antibody Screen NEG    PREPARE RBC (CROSSMATCH), 2 Units   Result Value Ref Range    Product Code Blood Bank [id number]     Description Blood Bank Red Blood Cells, Irradiated, Leuko-reduced     Unit Number [id number]     Dispense Status Blood Bank transfused     Product Code Blood Bank [id number]     Description Blood Bank Red Blood Cells, Leuko-reduced     Unit Number [id number]     Dispense Status Blood Bank transfused    PREPARE RBC (CROSSMATCH), 1 Units   Result Value Ref Range    Product Code Blood Bank [id number]     Description Blood Bank Red Blood Cells, Apheresis, Leuko-reduced     Unit Number [id number]     Dispense Status Blood Bank issued        RADIOLOGY:  Interpreted by Radiologist.  XR CHEST (2 VW)   Final Result   No acute cardiopulmonary abnormality. CT ABDOMEN PELVIS W WO CONTRAST Additional Contrast? None    (Results Pending)           ------------------------- NURSING NOTES AND VITALS REVIEWED ---------------------------   The nursing notes within the ED encounter and vital signs as below have been reviewed by myself. BP (!) 142/75   Pulse 75   Temp 98.1 °F (36.7 °C) (Oral)   Resp 18   Ht 6' (1.829 m)   Wt 174 lb 13.2 oz (79.3 kg)   SpO2 97%   BMI 23.71 kg/m²   Oxygen Saturation Interpretation: Normal    The patients available past medical records and past encounters were reviewed.         ------------------------------ ED COURSE/MEDICAL DECISION MAKING----------------------  Medications   octreotide (SANDOSTATIN) 500 mcg in sodium chloride 0.9 % 100 mL infusion (50 mcg/hr Intravenous Rate/Dose Change 10/14/20 0917)   sodium chloride flush 0.9 % injection 10 mL (10 mLs Intravenous Given 10/14/20 0817)   sodium chloride flush 0.9 % injection 10 mL (has no administration in time range)   acetaminophen (TYLENOL) tablet 650 mg (has no administration in time range)     Or   acetaminophen (TYLENOL) suppository 650 mg (has no administration in time range)   polyethylene glycol (GLYCOLAX) packet 17 g (has no administration in time range)   promethazine (PHENERGAN) tablet 12.5 mg (has no administration in time range)     Or   ondansetron (ZOFRAN) injection 4 mg (has no administration in time range)   lactated ringers infusion (150 mL/hr Intravenous New Bag 10/13/20 1516)   pantoprazole (PROTONIX) 80 mg in sodium chloride 0.9 % 100 mL infusion (8 mg/hr Intravenous New Bag 10/14/20 0803)   0.9 % sodium chloride bolus (has no administration in time range)   folic acid injection 1 mg (has no administration in time range)   thiamine (B-1) injection 100 mg (has no administration in time range)   cefTRIAXone (ROCEPHIN) 1 g in sterile water 10 mL IV syringe (has no administration in time range)   sodium chloride flush 0.9 % injection 10 mL (has no administration in time range)   sodium chloride flush 0.9 % injection 10 mL (has no administration in time range)   LORazepam (ATIVAN) tablet 1 mg (has no administration in time range)     Or   LORazepam (ATIVAN) injection 1 mg (has no administration in time range)     Or   LORazepam (ATIVAN) tablet 2 mg (has no administration in time range)     Or   LORazepam (ATIVAN) injection 2 mg (has no administration in time range)     Or   LORazepam (ATIVAN) tablet 3 mg (has no administration in time range)     Or   LORazepam (ATIVAN) injection 3 mg (has no administration in time range)     Or   LORazepam (ATIVAN) tablet 4 mg (has no administration in time range)     Or   LORazepam (ATIVAN) injection 4 mg (has no administration in time range)   pantoprazole (PROTONIX) injection 40 mg (40 mg Intravenous Given 10/13/20 1912)   0.9 % sodium chloride bolus (250 mLs Intravenous New Bag 10/13/20 1920)   cefTRIAXone (ROCEPHIN) 2 g in sterile water 20 mL IV syringe (2 g Intravenous Given 10/13/20 1906)   0.9 % sodium chloride bolus (0 mLs Intravenous Stopped 10/14/20 0500)   thiamine (B-1) injection 100 mg (100 mg Intravenous Given 65/73/51 5311)   folic acid injection 1 mg (1 mg Intravenous Given 10/14/20 0817)             Medical Decision Making:    Patient presents with esophageal varices vomiting bright red blood. Found to be severely anemic 2 units packed RBCs were ordered, patient was also ordered IV Protonix Rocephin and somatostatin. Consultation was made with patient's PCP for admission.   Also discussed admission to the ICU with intensivist and discussed patient's presentation with his GI physician. Re-Evaluations:             Re-evaluation. Patients symptoms are improving      Consultations:             Dr. Dylon Lozoya, Dr. Shaheed Caal. Hospitalist.     Critical Care:   CRITICAL CARE:   35 MINUTES. Please note that the withdrawal or failure to initiate urgent interventions for this patient would likely result in a life threatening deterioration or permanent disability. Accordingly this patient received the above mentioned time, excluding separately billable procedures. This patient's ED course included: a personal history and physicial examination, re-evaluation prior to disposition, multiple bedside re-evaluations, IV medications, cardiac monitoring, continuous pulse oximetry, complex medical decision making and emergency management, PRBCs and IV octreotide and a personal history and physicial eaxmination    This patient has remained hemodynamically stable, improved and been closely monitored during their ED course. Counseling: The emergency provider has spoken with the patient and discussed todays results, in addition to providing specific details for the plan of care and counseling regarding the diagnosis and prognosis. Questions are answered at this time and they are agreeable with the plan.       --------------------------------- IMPRESSION AND DISPOSITION ---------------------------------    IMPRESSION  1. Bleeding esophageal varices, unspecified esophageal varices type ([address]        DISPOSITION  Disposition: Admit to CCU/ICU  Patient condition is serious        NOTE: This report was transcribed using voice recognition software.  Every effort was made to ensure accuracy; however, inadvertent computerized transcription errors may be present        Sade Rod DO  10/14/20 1159

## 2020-10-13 NOTE — ED NOTES
FIRST PROVIDER CONTACT ASSESSMENT NOTE      Department of Emergency Medicine   10/13/20  4:48 PM EDT    Chief Complaint: Hematemesis (history of espageal varices, last banding 5 years ago) and Dizziness      History of Present Illness:   Corey Sam is a 72 y.o. male who presents to the ED for hematemesis. Patient also states that he is extremely dizzy. Patient has a history of esophageal varices which had needed banding of 5 years ago. Patient states that yesterday he started with very dark brown emesis and is now bright red. Patient also states that he is passing very dark black stools. Medical History:  has a past medical history of Cirrhosis ([address] Esophageal varices ([address] GI bleed, and Hepatitis C. Surgical History:  has a past surgical history that includes Leg Surgery; Endoscopy, colon, diagnostic (4/30/2013); Upper gastrointestinal endoscopy (09/02/2016); Upper gastrointestinal endoscopy (03/27/2018); and Upper gastrointestinal endoscopy (N/A, 3/27/2018). Social History:  reports that he has never smoked. He has never used smokeless tobacco. He reports current alcohol use. He reports that he does not use drugs. Family History: family history is not on file. *ALLERGIES*     Patient has no known allergies.      Physical Exam:      VS:  Pulse 114   Temp 97.7 °F (36.5 °C) (Temporal)   Resp 16   Ht 6' (1.829 m)   Wt 185 lb (83.9 kg)   SpO2 100%   BMI 25.09 kg/m²      Initial Plan of Care:  Initiate Treatment-Testing, Proceed toTreatment Area When Bed Available for ED Attending/MLP to Continue Care    -----------------[address] ASSESSMENT NOTE--------------  Electronically signed by JUAN ALBERTO Wahl CNP   DD: 10/13/20           JUAN ALBERTO Wahl CNP  10/13/20 8633

## 2020-10-14 NOTE — CONSULTS
Gastroenterology Consult Note   Vicky Patel [facility] with Claudia Borrego M.D. Consult Note        Date of Service: 10/14/2020  Reason for Consult: upper gi bleed, esophageal varices  Requesting Physician: Dr. David Hernandez, Dr. Etienne He:  Nausea, hematemesis, and dark stools    History Obtained From:  patient, electronic medical record    HISTORY OF PRESENT ILLNESS:       Addie Reed is a 72 y.o. male with significant past medical history of hepatitis C, alcohol abuse, esophageal varices with banding, cirrhosis of liver, and HTN admitted via ED for nausea, hematemesis, and dark stools. Pt reports Monday afternoon he woke up nauseated and starting throwing up dark blood, denies clots or bright red blood. Pt states he then noticed black tarry stools for 2 days. Pt states when he would stand up he would get dizzy. Pt denies SOB, fever, chills, abdominal pain, hematochezia or wt loss. Admission labs: H&H 6.3 & 21.8 (received 2 units PRBCs); RBC 2.89; MCV 75.4; MCH 21.8; MCHC 28.9; RDW 21.9; platelet 431; lymphs 13.6%; absolute lymphs 0.70; absolute eos 0.03; INR 1.3; albumin 3.3; AST 41; bilirubin 1.7; glucose 104. EGD with banding 3/27/18 with Dr Israel Hassan showed nonbleeding esophageal varices, three bands; unremarkable stomach and duodenum. EGD 10/17/16 with Dr Israel Hassan demonstrated distal esophageal varices, 2 bands applied; unremarkable stomach and duodenum. EGD 9/2/16 with Dr Shailesh Minor esophageal varices - non-bleeding and gastritis. EGD 4/30/13 with Dr Shilo Salinas - antral ulcer/distal gastritis. Colonoscopy to the cecum with snare polypectomy 5/1/13 with Dr Shilo Salinas - old blood in cecum, polyp sigmoid. Small non-bleeding internal hemorrhoids. Consultation for upper gi bleed, esophageal varices. Pt is known to Dr. Angel Brewer, last seen in office 10/14/2015, for procedure 4/2016 since has been lost to follow up. Currently, pt denies abdominal pain. He states his nausea has improved. Denies BM today.   Labs today: H&H 7.1 & 23.3; RBC 2.98; WBC 1.8; MCV 78.2; MCHC 23.8; MCHC 30.5; RDW 21.9; Plate 52; abs neut 1.75; abs lymph 0.50; abs eos 0.02; Alb 2.8; AST 42; Bili 1.5; BUN 25; ; Ca 8.5.     Past Medical History:        Diagnosis Date    Cirrhosis ([facility] [address]     Esophageal varices (HCC)     GI bleed     UPPER- 15 MONTHS AGO    Hepatitis C     Hypertension      Past Surgical History:        Procedure Laterality Date    ENDOSCOPY, COLON, DIAGNOSTIC  4/30/2013    LEG SURGERY      right leg    UPPER GASTROINTESTINAL ENDOSCOPY  09/02/2016    Leland Owusu, nonbleeding esophageal varicies and gastritis    UPPER GASTROINTESTINAL ENDOSCOPY  03/27/2018    UPPER GASTROINTESTINAL ENDOSCOPY N/A 3/27/2018    EGD BAND LIGATION performed by Trudi Dukes MD at [facility] ENDOSCOPY     Current Medications:    Current Facility-Administered Medications: 0.9 % sodium chloride bolus, 20 mL, Intravenous, Once  [START ON 20/07/7286] folic acid injection 1 mg, 1 mg, Intravenous, Daily  [START ON 10/15/2020] thiamine (B-1) injection 100 mg, 100 mg, Intravenous, Daily  cefTRIAXone (ROCEPHIN) 1 g in sterile water 10 mL IV syringe, 1 g, Intravenous, Q24H  sodium chloride flush 0.9 % injection 10 mL, 10 mL, Intravenous, 2 times per day  sodium chloride flush 0.9 % injection 10 mL, 10 mL, Intravenous, PRN  LORazepam (ATIVAN) tablet 1 mg, 1 mg, Oral, Q1H PRN **OR** LORazepam (ATIVAN) injection 1 mg, 1 mg, Intravenous, Q1H PRN **OR** LORazepam (ATIVAN) tablet 2 mg, 2 mg, Oral, Q1H PRN **OR** LORazepam (ATIVAN) injection 2 mg, 2 mg, Intravenous, Q1H PRN **OR** LORazepam (ATIVAN) tablet 3 mg, 3 mg, Oral, Q1H PRN **OR** LORazepam (ATIVAN) injection 3 mg, 3 mg, Intravenous, Q1H PRN **OR** LORazepam (ATIVAN) tablet 4 mg, 4 mg, Oral, Q1H PRN **OR** LORazepam (ATIVAN) injection 4 mg, 4 mg, Intravenous, Q1H PRN  octreotide (SANDOSTATIN) 500 mcg in sodium chloride 0.9 % 100 mL infusion, 50 mcg/hr, Intravenous, Continuous  sodium chloride flush 0.9 % injection 10 mL, 10 mL, Intravenous, 2 times per day  sodium chloride flush 0.9 % injection 10 mL, 10 mL, Intravenous, PRN  acetaminophen (TYLENOL) tablet 650 mg, 650 mg, Oral, Q6H PRN **OR** acetaminophen (TYLENOL) suppository 650 mg, 650 mg, Rectal, Q6H PRN  polyethylene glycol (GLYCOLAX) packet 17 g, 17 g, Oral, Daily PRN  promethazine (PHENERGAN) tablet 12.5 mg, 12.5 mg, Oral, Q6H PRN **OR** ondansetron (ZOFRAN) injection 4 mg, 4 mg, Intravenous, Q6H PRN  lactated ringers infusion, , Intravenous, Continuous  pantoprazole (PROTONIX) 80 mg in sodium chloride 0.9 % 100 mL infusion, 8 mg/hr, Intravenous, Continuous    Allergies:  Patient has no known allergies. Social History:    Tobacco:  Pt denies  Alcohol:  Pt reports 6-8 12oz beers/day \"which is much less I used to drink, used to be at least 12, maybe more a day. \"   Illicit Drugs: Pt denies    Family History: Mother- , pulmonary disease  Father- , dementia  Sister- , overdose  Sister- living, COPD  Sister- living, healthy  Brother-living, arthritis  Children (3) living, healty    REVIEW OF SYSTEMS:    Aside from what was mentioned in the PMH and HPI, essentially unremarkable, all others negative. PHYSICAL EXAM:      Vitals:    BP (!) 142/75   Pulse 75   Temp 98.1 °F (36.7 °C) (Oral)   Resp 18   Ht 6' (1.829 m)   Wt 174 lb 13.2 oz (79.3 kg)   SpO2 97%   BMI 23.71 kg/m²       CONSTITUTIONAL:  awake, alert, pale, cooperative, no apparent distress, and appears stated age  EYES:  pupils equal, round and reactive to light, sclera anicteric and conjunctiva pale  ENT:  normocephalic, oral pharynx with moist mucous membranes  LUNGS:  clear to auscultation bilaterally.   CARDIOVASCULAR:  regular rate and rhythm, no muurmur noted; 2+ pulses; no edema  ABDOMEN:  normal bowel sounds, soft, non-distended, non-tender, no masses palpated, no hepatosplenomegally  MUSCULOSKELETAL:  full range of motion noted  motor strength is 5 out of 5 all extremities bilaterally  NEUROLOGIC:  Mental Status Exam:  Level of Alertness:   awake  Orientation:   person, place, time  Motor Exam:  Motor exam is symmetrical 5 out of 5 all extremities bilaterally  SKIN:  pale skin color, normal texture, turgor    DATA:    CBC with Differential:    Lab Results   Component Value Date    WBC 1.8 10/14/2020    RBC 2.98 10/14/2020    RBC  09/01/2016      RBC           [id number]    transfused   09/02/16  00:19  SCC    RBC  09/01/2016      RBC           [id number]    transfused   09/02/16  19:47  SCC    HGB 7.1 10/14/2020    HCT 23.3 10/14/2020    PLT 52 10/14/2020    MCV 78.2 10/14/2020    MCH 23.8 10/14/2020    MCHC 30.5 10/14/2020    RDW 21.9 10/14/2020    SEGSPCT 64 05/01/2013    LYMPHOPCT 27.6 10/14/2020    MONOPCT 10.5 10/14/2020    BASOPCT 1.1 10/14/2020    MONOSABS 0.19 10/14/2020    LYMPHSABS 0.50 10/14/2020    EOSABS 0.02 10/14/2020    BASOSABS 0.02 10/14/2020     CMP:    Lab Results   Component Value Date     10/14/2020    K 4.3 10/14/2020    K 4.3 10/14/2020     10/14/2020    CO2 24 10/14/2020    BUN 25 10/14/2020    CREATININE 1.0 10/14/2020    GFRAA >60 10/14/2020    LABGLOM >60 10/14/2020    GLUCOSE 102 10/14/2020    PROT 6.0 10/14/2020    LABALBU 2.8 10/14/2020    CALCIUM 8.5 10/14/2020    BILITOT 1.5 10/14/2020    ALKPHOS 54 10/14/2020    AST 42 10/14/2020    ALT 6 10/14/2020     Hepatic Function Panel:    Lab Results   Component Value Date    ALKPHOS 54 10/14/2020    ALT 6 10/14/2020    AST 42 10/14/2020    PROT 6.0 10/14/2020    BILITOT 1.5 10/14/2020    BILIDIR 0.5 03/28/2018    IBILI 0.7 03/28/2018    LABALBU 2.8 10/14/2020     PT/INR:    Lab Results   Component Value Date    PROTIME 16.3 10/14/2020    INR 1.4 10/14/2020     PTT:    Lab Results   Component Value Date    APTT 32.9 10/13/2020   [APTT}  Last 3 Troponin:    Lab Results   Component Value Date    TROPONINI <0.01 10/13/2020    TROPONINI <0.01 03/26/2018    TROPONINI <0.01 10/15/2016 TSH:    Lab Results   Component Value Date    TSH 1.100 09/03/2016     VITAMIN B12:   Lab Results   Component Value Date    SCMUGUAV68 121 09/03/2016     FOLATE:    Lab Results   Component Value Date    FOLATE 14.0 09/03/2016     IRON:    Lab Results   Component Value Date    IRON 31 10/18/2016     Iron Saturation:    Lab Results   Component Value Date    LABIRON 11 10/18/2016     TIBC:    Lab Results   Component Value Date    TIBC 291 10/18/2016     FERRITIN:    Lab Results   Component Value Date    FERRITIN 22 10/18/2016       No components found for: CHLPLat  Lab Results   Component Value Date    TRIG 72 04/30/2013   e    HDL 78.8 04/30/2013 04/30/2013        Xr Chest (2 Vw)    Result Date: 10/13/2020  EXAMINATION: TWO XRAY VIEWS OF THE CHEST 10/13/2020 6:03 pm COMPARISON: October 15, 2016 HISTORY: ORDERING SYSTEM PROVIDED HISTORY: hematemesis,chest pain TECHNOLOGIST PROVIDED HISTORY: Reason for exam:->hematemesis,chest pain FINDINGS: The lungs are clear. The cardiomediastinal contour is unremarkable. No pneumothorax or pleural effusion is seen. The visualized bones are intact without fracture or focal lesion     No acute cardiopulmonary abnormality. IMPRESSION:  · Gi bleed, likely upper - hematemesis with melena  · Anemia, microcytic, hypochromic  · Cirrhosis of liver, alcohol and HCV  · Esophageal varices  · Abnormal lfts  · Nausea and vomiting  · Dizziness   · Hepatitis C, chronic  · Alcohol abuse     RECOMMENDATIONS:    · Critical Care management per ICU  · EGD possible banding today with Dr Angy Ayala. Procedure details for EGD discussed in detail. Complications including but not limited to, perforation, bleeding and infection were discussed in great detail. Risks, benefits, and alternatives explained. Pt has understood the information and has agreed to proceed.    · Octreotide and Protonix gtts as ordered  · CT Abd/pelvis IV contrast   · Medicate for nausea as ordered  · Serial H&H,

## 2020-10-14 NOTE — FLOWSHEET NOTE
admitted to hospital on Oct 13 202 from E.R . Alert and cooperative. Belongings are 1 shirt. 1 pants . Pair of shoes. [employer] cell phone and . patient lives with daughter in private house, 15 steps from first floor to second floor. bilateral handrail

## 2020-10-14 NOTE — PROGRESS NOTES
[facility] Hospitalist   Progress Note    Admitting Date and Time: 10/13/2020  5:44 PM  Admit Dx: H/O esophageal varices [Z87.19]  H/O esophageal varices [Z87.19]    Subjective: Admitted in the evening of 13th, presented with hematemesis, patient with known hep C, alcohol use, prior banding for esophageal varices, came with hematemesis and dark stools. Hemoglobin of 7. Was started on PPI, octreotide drip, and PRBC transfusion. Admitted to ICU. Seen by GI, EGD with banding planned. Patient was admitted with H/O esophageal varices [Z87.19]  H/O esophageal varices [Z87.19]. Patient feels better, at this time awake, alert, in ICU, back from EGD. Does communicate well says that he had this done 4 times before, he also says some kind of MRI this is being planned from the subspecialty decided. Per RN: Bleeding was noted and patient was treated. ROS: denies fever, chills, cp, sob, n/v, HA unless stated above.      sodium chloride  20 mL Intravenous Once    [START ON 07/12/7322] folic acid  1 mg Intravenous Daily    [START ON 10/15/2020] thiamine  100 mg Intravenous Daily    cefTRIAXone (ROCEPHIN) IV  1 g Intravenous Q24H    sodium chloride flush  10 mL Intravenous 2 times per day    sodium chloride flush  10 mL Intravenous 2 times per day     sodium chloride flush, 10 mL, PRN  LORazepam, 1 mg, Q1H PRN    Or  LORazepam, 1 mg, Q1H PRN    Or  LORazepam, 2 mg, Q1H PRN    Or  LORazepam, 2 mg, Q1H PRN    Or  LORazepam, 3 mg, Q1H PRN    Or  LORazepam, 3 mg, Q1H PRN    Or  LORazepam, 4 mg, Q1H PRN    Or  LORazepam, 4 mg, Q1H PRN  sodium chloride flush, 10 mL, PRN  acetaminophen, 650 mg, Q6H PRN    Or  acetaminophen, 650 mg, Q6H PRN  polyethylene glycol, 17 g, Daily PRN  promethazine, 12.5 mg, Q6H PRN    Or  ondansetron, 4 mg, Q6H PRN         Objective:    BP (!) 142/75   Pulse 75   Temp 98.1 °F (36.7 °C) (Oral)   Resp 18   Ht 6' (1.829 m)   Wt 174 lb 13.2 oz (79.3 kg)   SpO2 97%   BMI 23.71 kg/m²   General Appearance: alert and oriented to person, place and time, well-developed and well-nourished, in no acute distress  Skin: warm and dry, no rash or erythema  Head: normocephalic and atraumatic  Eyes: pupils equal, round, and reactive to light, extraocular eye movements intact, conjunctivae normal  ENT: tympanic membrane, external ear and ear canal normal bilaterally, oropharynx clear and moist with normal mucous membranes  Neck: neck supple and non tender without mass, no thyromegaly or thyroid nodules, no cervical lymphadenopathy   Pulmonary/Chest: clear to auscultation bilaterally- no wheezes, rales or rhonchi, normal air movement, no respiratory distress  Cardiovascular: normal rate, normal S1 and S2, no gallops, intact distal pulses and no carotid bruits  Abdomen: soft, non-tender, non-distended, normal bowel sounds, no masses or organomegaly      Recent Labs     10/13/20  1708 10/14/20  0540    136   K 4.0 4.3  4.3    106   CO2 24 24   BUN 21 25*   CREATININE 0.8 1.0   GLUCOSE 104* 102*   CALCIUM 9.1 8.5*       Recent Labs     10/13/20  1708 10/14/20  0152 10/14/20  0540   WBC 5.1  --  1.8*   RBC 2.89*  --  2.98*   HGB 6.3* 6.5* 7.1*   HCT 21.8* 21.2* 23.3*   MCV 75.4*  --  78.2*   MCH 21.8*  --  23.8*   MCHC 28.9*  --  30.5*   RDW 21.9*  --  21.9*   *  --  52*   MPV 10.2  --  NOT CALC       Last hemoglobin 6.6  WBC 1.4    Radiology:   XR CHEST (2 VW)   Final Result   No acute cardiopulmonary abnormality. CT ABDOMEN PELVIS W WO CONTRAST Additional Contrast? None    (Results Pending)       Assessment:    Active Problems:    H/O esophageal varices  Resolved Problems:    * No resolved hospital problems. *      Plan:  1. GI bleed, did have EGD, official report pending, as per available information the active bleeding is treated, patient on IV PPI as well as octreotide. 2.         Patient with known cirrhosis, psoriasis, followed by GI.   Patient has been on Corgard at home, to be resumed. 3.         Severe anemia, moderate blood loss, needs to be transfused to keep hemoglobin more than 7.  4.         Patient does remain on thiamine as well as folic acid supplement, as well as at this time on Rocephin also. 5.         Remains in ICU, with critical care on board.         Electronically signed by Isela Reed MD on 10/14/2020 at 12:41 PM

## 2020-10-14 NOTE — CONSULTS
Critical Care Admit/Consult Note         Patient - Dolores Lynn   MRN -  [id number]   Acct # - [de-identified]   - 1955      Date of Admission -  10/13/2020  5:44 PM  Date of evaluation -  10/14/2020  [id number]   Hospital Day - 1            ADMIT/CONSULT DETAILS     Reason for Admit/Consult   GI Johnny Castillo MD  Primary Care Physician - No primary care provider on file. HPI   The patient is a 72 y.o. male with significant past medical history of alcohol abuse, GI bleeding, esophageal varices and banding, hepatitis C. Patient presented to the emergency department with hematemesis since yesterday. Stated that he had significant amount of hematemesis yesterday as well as melena. Patient has had variceal banding in the past most recently approximately 2 years ago. He is a chronic drinker. Drinks approximately a sixpack a day. Last drink was about 2 days ago.   Patient denies any history of seizures or going into the withdrawal.      Past Medical History         Diagnosis Date    Cirrhosis ([address]     Esophageal varices (HCC)     GI bleed     UPPER- 15 MONTHS AGO    Hepatitis C     Hypertension         Past Surgical History           Procedure Laterality Date    ENDOSCOPY, COLON, DIAGNOSTIC  2013    LEG SURGERY      right leg    UPPER GASTROINTESTINAL ENDOSCOPY  2016    Nevarez, nonbleeding esophageal varicies and gastritis    UPPER GASTROINTESTINAL ENDOSCOPY  2018    UPPER GASTROINTESTINAL ENDOSCOPY N/A 3/27/2018    EGD BAND LIGATION performed by [facility]amie Walker [facility] at [facility]   Peripheral access    Current Medications   Current Medications    thiamine  100 mg Intravenous Once    folic acid  1 mg Intravenous Once    sodium chloride  250 mL Intravenous Once    sodium chloride flush  10 mL Intravenous 2 times per day    cefTRIAXone (ROCEPHIN) IV  1 g Intravenous Q24H     sodium chloride flush, acetaminophen **OR** acetaminophen, polyethylene glycol, promethazine **OR** ondansetron  IV Drips/Infusions   octreotide (SANDOSTATIN) infusion 25 mcg/hr (10/14/20 0039)    lactated ringers 150 mL/hr (10/13/20 2147)    pantoprozole (PROTONIX) infusion 8 mg/hr (10/14/20 0033)     Home Medications  No medications prior to admission. Diet/Nutrition   Diet NPO Effective Now    Allergies   Patient has no known allergies. Social History   Tobacco   reports that he has never smoked. He has never used smokeless tobacco.    Alcohol     reports current alcohol use. Occupational history :    Family History   History reviewed. No pertinent family history. ROS     REVIEW OF SYSTEMS:  Review of Systems   Constitutional: Negative for fever and unexpected weight change. HENT: Negative for congestion. Respiratory: Negative for shortness of breath. Cardiovascular: Negative for chest pain. Gastrointestinal: Positive for blood in stool and vomiting (Hematemesis). Negative for abdominal distention. Genitourinary: Negative for difficulty urinating. Musculoskeletal: Negative for arthralgias. Skin: Negative for wound. Neurological: Negative for dizziness. Psychiatric/Behavioral: Negative for agitation. Mechanical Ventilation Data   VENT SETTINGS (Comprehensive)  Vent Information  SpO2: 99 %  Additional Respiratory  Assessments  Pulse: 73  Resp: 14  SpO2: 99 %    ABG  No results found for: PH, PCO2, PO2, HCO3, O2SAT  No results found for: IFIO2, MODE, SETTIDVOL, SETPEEP        Vitals    height is 6' (1.829 m) and weight is 174 lb 13.2 oz (79.3 kg). His oral temperature is 98.4 °F (36.9 °C). His blood pressure is 112/62 and his pulse is 73. His respiration is 14 and oxygen saturation is 99%.        Temperature Range: Temp: 98.4 °F (36.9 °C) Temp  Av.1 °F (36.7 °C)  Min: 97.4 °F (36.3 °C)  Max: 98.8 °F (37.1 °C)  BP Range:  Systolic (06EWL), LPM:030 , Min:112 , KAH:080     Diastolic (24hrs), Av, Min:56, Max:77    Pulse Range: Pulse  Av.9  Min: 72  Max: 121  Respiration Range: Resp  Avg: 15.7  Min: 8  Max: 32  Current Pulse Ox[de-identified]  SpO2: 99 %  24HR Pulse Ox Range:  SpO2  Av.1 %  Min: 96 %  Max: 100 %  Oxygen Amount and Delivery:        I/O (24 Hours)    Patient Vitals for the past 8 hrs:   BP Temp Temp src Pulse Resp SpO2 Weight   10/14/20 0600 112/62 -- -- 73 14 99 % --   10/14/20 0500 137/61 -- -- 72 21 98 % --   10/14/20 0400 (!) 145/65 98.4 °F (36.9 °C) Oral 78 12 99 % --   10/14/20 0301 (!) 116/59 98.5 °F (36.9 °C) Oral 79 12 97 % --   10/14/20 0300 (!) 116/59 -- -- 79 13 -- --   10/14/20 0200 (!) 147/71 -- -- 98 (!) 32 96 % --   10/14/20 0100 (!) 144/68 -- -- 83 12 99 % --   10/14/20 0031 134/64 97.5 °F (36.4 °C) -- 88 16 -- --   10/14/20 0000 (!) 121/56 97.4 °F (36.3 °C) Oral 88 14 100 % 174 lb 13.2 oz (79.3 kg)       Intake/Output Summary (Last 24 hours) at 10/14/2020 0719  Last data filed at 10/14/2020 0500  Gross per 24 hour   Intake 1620 ml   Output 550 ml   Net 1070 ml     I/O last 3 completed shifts: In: 0340 [I.V.:912; Blood:708]  Out: 550 [Urine:550]   Date 10/14/20 0000 - 10/14/20 2359   Shift 3430-1191 2741-2218 3027-2213 24 Hour Total   INTAKE   I.V.(mL/kg) 912(11.5)   912(11.5)   Blood(mL/kg) 708(8.9)   708(8.9)   Shift Total(mL/kg) 1620(20.4)   1620(20.4)   OUTPUT   Urine(mL/kg/hr) 550   550   Shift Total(mL/kg) 550(6.9)   550(6.9)   Weight (kg) 79.3 79.3 79.3 79.3     Patient Vitals for the past 96 hrs (Last 3 readings):   Weight   10/14/20 0000 174 lb 13.2 oz (79.3 kg)   10/13/20 2129 174 lb 13.2 oz (79.3 kg)   10/13/20 1648 185 lb (83.9 kg)       Exam         PHYSICAL EXAM:    General appearance - alert, well appearing, and in no distress and oriented to person, place, and time.    Mental status -normal mood, behavior, speech  Eyes - pupils equal and reactive, extraocular eye movements intact, sclera anicteric, conjunctival pallor  Ears - external ear normal  Nose - normal and patent, no erythema, discharge or polyps  Mouth - mucous membranes moist, pharynx normal without lesions  Neck - supple, no significant adenopathy  Chest - clear to auscultation, no wheezes, rales or rhonchi, symmetric air entry  Heart - normal rate, regular rhythm, no murmurs, rubs, clicks or gallops  Abdomen - soft, nontender, nondistended, no fluid wave, no caput medusa  Neurological - alert, oriented, normal speech, no focal findings or movement disorder noted  Extremities - peripheral pulses normal, no clubbing or cyanosis. No edema. Skin - normal coloration and turgor, no rashes, no palmar erythema, no spider angiomata.     Data   Old records and images have been reviewed    Lab Results   CBC     Lab Results   Component Value Date    WBC 1.8 10/14/2020    RBC 2.98 10/14/2020    RBC  09/01/2016      RBC           [id number]    transfused   09/02/16  00:19  SCC    RBC  09/01/2016      RBC           [id number]    transfused   09/02/16  19:47  SCC    HGB 7.1 10/14/2020    HCT 23.3 10/14/2020    PLT 52 10/14/2020    MCV 78.2 10/14/2020    MCH 23.8 10/14/2020    MCHC 30.5 10/14/2020    RDW 21.9 10/14/2020    SEGSPCT 64 05/01/2013    LYMPHOPCT 27.6 10/14/2020    MONOPCT 10.5 10/14/2020    BASOPCT 1.1 10/14/2020    MONOSABS 0.19 10/14/2020    LYMPHSABS 0.50 10/14/2020    EOSABS 0.02 10/14/2020    BASOSABS 0.02 10/14/2020       BMP   Lab Results   Component Value Date     10/14/2020    K 4.3 10/14/2020    K 4.0 10/13/2020     10/14/2020    CO2 24 10/14/2020    BUN 25 10/14/2020    CREATININE 1.0 10/14/2020    GLUCOSE 102 10/14/2020    CALCIUM 8.5 10/14/2020       LFTS  Lab Results   Component Value Date    ALKPHOS 54 10/14/2020    ALT 6 10/14/2020    AST 42 10/14/2020    PROT 6.0 10/14/2020    BILITOT 1.5 10/14/2020    BILIDIR 0.5 03/28/2018    IBILI 0.7 03/28/2018    LABALBU 2.8 10/14/2020       INR  Recent Labs     10/13/20  1709   PROTIME 15.3*   INR 1.3 cells  -Repeat hemoglobin this morning 7.1  -Continue trending hemoglobin    Endocrine   Monitor blood sugar regularly  Keep glucose between 120 and 180    Social/Spiritual/DNR/Other   Full code  NPO      Jennyfer Dennis DO, PGY1.                       10/14/2020, 7:19 AM    Addendum:    I personally saw, examined and provided care for the patient. Radiographs, labs and medication list were reviewed by me independently. Patient was taken for EGD. Findings showed GERD with grade 2 out of 4 varices. And stomach there was gastritis and portal hypertensive gastropathy changes with oozing from a small vascular area which a hemiclip was applied with excellent hemostasis. Patient has history of pancytopenia. Most likely from chronic alcoholism. He currently has acute on chronic anemia. H&H dropped to 6.6. We will transfuse 2 units packed RBC. We will continue the octreotide drip, switch Protonix to twice daily. Not the patient can start clear liquids will cut down his IV fluids at 75 an hour. I spoke with bedside nursing, therapists and consultants. Critical care services and times documented are independent of procedures and multidisciplinary rounds with Residents. Additionally comprehensive, multidisciplinary rounds were conducted with the MICU team. The case was discussed in detail and plans for care were established. Review of Residents documentation was conducted and revisions were made as appropriate. I agree with the above documented exam, problem list and plan of care.       Thu Ruiz MD  CCT excluding procedures: 39'

## 2020-10-14 NOTE — ANESTHESIA PRE PROCEDURE
Department of Anesthesiology  Preprocedure Note       Name:  Sol Mention   Age:  72 y.o.  :  1955                                          MRN:  [id number]         Date:  10/14/2020      Surgeon: Concepcion Correia):  Davin Hui MD    Procedure: Procedure(s):  EGD BIOPSY    Medications prior to admission:   Prior to Admission medications    Not on File       Current medications:    Current Facility-Administered Medications   Medication Dose Route Frequency Provider Last Rate Last Dose    0.9 % sodium chloride bolus  20 mL Intravenous Once JUAN ALBERTO Strickland - CNS        [START ] folic acid injection 1 mg  1 mg Intravenous Daily MD Bernardo Trotter [START ON 10/15/2020] thiamine (B-1) injection 100 mg  100 mg Intravenous Daily Butch Urena MD        cefTRIAXone (ROCEPHIN) 1 g in sterile water 10 mL IV syringe  1 g Intravenous Q24H Butch Urena MD        sodium chloride flush 0.9 % injection 10 mL  10 mL Intravenous 2 times per day Butch Urena MD   Stopped at 10/14/20 1316    sodium chloride flush 0.9 % injection 10 mL  10 mL Intravenous PRN Butch Urena MD        LORazepam (ATIVAN) tablet 1 mg  1 mg Oral Q1H PRN Butch Urena MD        Or    LORazepam (ATIVAN) injection 1 mg  1 mg Intravenous Q1H PRN Butch Urena MD        Or    LORazepam (ATIVAN) tablet 2 mg  2 mg Oral Q1H PRN Butch Urena MD        Or    LORazepam (ATIVAN) injection 2 mg  2 mg Intravenous Q1H PRN Butch Urena MD        Or    LORazepam (ATIVAN) tablet 3 mg  3 mg Oral Q1H PRN Butch Urena MD        Or    LORazepam (ATIVAN) injection 3 mg  3 mg Intravenous Q1H PRN Butch Urena MD        Or    LORazepam (ATIVAN) tablet 4 mg  4 mg Oral Q1H PRN Butch Urena MD        Or    LORazepam (ATIVAN) injection 4 mg  4 mg Intravenous Q1H PRN Butch Urena MD        0.9 % sodium chloride bolus  20 mL Intravenous Once Brielle Schwab DO        octreotide (SANDOSTATIN) 500 mcg in sodium chloride 0.9 % 100 mL infusion  50 mcg/hr Intravenous Continuous Jordon Gene, APRN - CNS 10 mL/hr at 10/14/20 1314 50 mcg/hr at 10/14/20 1314    sodium chloride flush 0.9 % injection 10 mL  10 mL Intravenous 2 times per day Jayde Luis MD   10 mL at 10/14/20 0817    sodium chloride flush 0.9 % injection 10 mL  10 mL Intravenous PRN Jayde Luis MD        acetaminophen (TYLENOL) tablet 650 mg  650 mg Oral Q6H PRN Jayde Luis MD        Or   Lilliana Ping acetaminophen (TYLENOL) suppository 650 mg  650 mg Rectal Q6H PRN Jayde Luis MD        polyethylene glycol (GLYCOLAX) packet 17 g  17 g Oral Daily PRN Jayde Luis MD        promethazine (PHENERGAN) tablet 12.5 mg  12.5 mg Oral Q6H PRN Jayde Luis MD        Or    ondansetron [facility]) injection 4 mg  4 mg Intravenous Q6H PRN Jayde Luis MD        lactated ringers infusion   Intravenous Continuous Jayde Luis  mL/hr at 10/14/20 1416      pantoprazole (PROTONIX) 80 mg in sodium chloride 0.9 % 100 mL infusion  8 mg/hr Intravenous Continuous Jayde Luis MD 10 mL/hr at 10/14/20 0803 8 mg/hr at 10/14/20 5572       Allergies:  No Known Allergies    Problem List:    Patient Active Problem List   Diagnosis Code    Hematemesis with nausea K92.0    Acute blood loss anemia D62    Alcohol abuse F10.10    Gastrointestinal hemorrhage with hematemesis K92.0    Chronic superficial gastritis without bleeding K29.30    Secondary esophageal varices with bleeding (HCC) I85.11    GI bleed K92.2    Esophageal varices (HCC) I85.00    H/O esophageal varices Z87.19       Past Medical History:        Diagnosis Date    Cirrhosis ([address]     Esophageal varices (HCC)     GI bleed     UPPER- 15 MONTHS AGO    Hepatitis C     Hypertension        Past Surgical History:        Procedure Laterality Date    ENDOSCOPY, COLON, DIAGNOSTIC  4/30/2013    LEG SURGERY      right leg  UPPER GASTROINTESTINAL ENDOSCOPY  09/02/2016    Dimitrios Stuart, nonbleeding esophageal varicies and gastritis    UPPER GASTROINTESTINAL ENDOSCOPY  03/27/2018    UPPER GASTROINTESTINAL ENDOSCOPY N/A 3/27/2018    EGD BAND LIGATION performed by Rubi Feliciano MD at [facility] History:    Social History     Tobacco Use    Smoking status: Never Smoker    Smokeless tobacco: Never Used   Substance Use Topics    Alcohol use: Yes     Comment: at least a 6 pack daily                                Counseling given: Not Answered      Vital Signs (Current):   Vitals:    10/14/20 1242 10/14/20 1300 10/14/20 1400 10/14/20 1445   BP: (!) 142/66 (!) 142/66 (!) 141/68 (!) 145/59   Pulse: 78 83 74 80   Resp: 16 19     Temp: 98.2 °F (36.8 °C)      TempSrc: Oral      SpO2: 99% 99% 100% 99%   Weight:       Height:                                                  BP Readings from Last 3 Encounters:   10/14/20 (!) 145/59   03/28/18 (!) 99/52   03/27/18 (!) 82/54       NPO Status:                                                                                 BMI:   Wt Readings from Last 3 Encounters:   10/14/20 174 lb 13.2 oz (79.3 kg)   03/28/18 171 lb 12.8 oz (77.9 kg)   10/18/16 189 lb 13.1 oz (86.1 kg)     Body mass index is 23.71 kg/m².     CBC:   Lab Results   Component Value Date    WBC 1.4 10/14/2020    RBC 2.79 10/14/2020    RBC  09/01/2016      RBC           [id number]    transfused   09/02/16  00:19  SCC    RBC  09/01/2016      RBC           [id number]    transfused   09/02/16  19:47  SCC    HGB 6.6 10/14/2020    HCT 22.1 10/14/2020    MCV 79.2 10/14/2020    RDW 21.6 10/14/2020    PLT 56 10/14/2020       CMP:   Lab Results   Component Value Date     10/14/2020    K 4.3 10/14/2020    K 4.3 10/14/2020     10/14/2020    CO2 24 10/14/2020    BUN 25 10/14/2020    CREATININE 1.0 10/14/2020    GFRAA >60 10/14/2020    LABGLOM >60 10/14/2020    GLUCOSE 102 10/14/2020    PROT 6.0 10/14/2020 CALCIUM 8.5 10/14/2020    BILITOT 1.5 10/14/2020    ALKPHOS 54 10/14/2020    AST 42 10/14/2020    ALT 6 10/14/2020       POC Tests: No results for input(s): POCGLU, POCNA, POCK, POCCL, POCBUN, POCHEMO, POCHCT in the last 72 hours. Coags:   Lab Results   Component Value Date    PROTIME 16.3 10/14/2020    INR 1.4 10/14/2020    APTT 32.9 10/13/2020       HCG (If Applicable): No results found for: PREGTESTUR, PREGSERUM, HCG, HCGQUANT     ABGs: No results found for: PHART, PO2ART, GPB4ABF, LBI3BWE, BEART, K3SXSODA     Type & Screen (If Applicable):  No results found for: LABABO, LABRH    Drug/Infectious Status (If Applicable):  No results found for: HIV, HEPCAB    COVID-19 Screening (If Applicable):   Lab Results   Component Value Date    COVID19 Not Detected 10/14/2020         Anesthesia Evaluation  Patient summary reviewed  Airway: Mallampati: II  TM distance: >3 FB   Neck ROM: full  Mouth opening: > = 3 FB Dental: normal exam         Pulmonary:Negative Pulmonary ROS breath sounds clear to auscultation                             Cardiovascular:    (+) hypertension:,         Rhythm: regular  Rate: normal                    Neuro/Psych:   (+) psychiatric history:            GI/Hepatic/Renal:   (+) hepatitis: C, liver disease:,           Endo/Other: Negative Endo/Other ROS                    Abdominal:       Abdomen: soft. Vascular:                                        Anesthesia Plan      MAC     ASA 3     (GI bleed Hgb 6.6 INR 1.4)  Induction: intravenous. Anesthetic plan and risks discussed with patient. Plan discussed with CRNA.                   Hortensia Galeazzi, MD   10/14/2020

## 2020-10-14 NOTE — CARE COORDINATION
COVID negative 10/14. Met w/ patient. Explained role of  and plan of care. Lives alone in a 2 story house. Independent PTA. Hx ETOH- admits to drinking 6 beers /day. [facility] has a PCP but has not seen them yet- name unknown. Pharmacy is [facility]. [facility] list offered and declined- does not want to speak with [facility]. On CIWA protocol. Sandostatin and Protonix drips continued. For EGD . Per pt, plan is to return home on discharge- [facility] has no needs. Will follow.  Amy

## 2020-10-14 NOTE — H&P
[facility] History and Physical      CHIEF COMPLAINT:   Hematemesis     History of Present Illness:      72 M with history of hep C, alcohol use disorder and esophageal varices s/p banding x4 presents with hematemesis and dark stools. In ED hemoglobin was under 7. Placed on PPI, octreotide drip and 2 unit prbc ordered . ER contacted ICU doctor and GI doctor     Informant(s) for H&P: patient     REVIEW OF SYSTEMS:  A comprehensive review of systems was negative except for: what is in the HPI       PMH:  Past Medical History:   Diagnosis Date    [address] [address]     Esophageal varices [address] [address]     GI bleed     UPPER- 15 MONTHS AGO    Hepatitis C        Surgical History:  Past Surgical History:   Procedure Laterality Date    ENDOSCOPY, COLON, DIAGNOSTIC  4/30/2013    LEG SURGERY      right leg    UPPER GASTROINTESTINAL ENDOSCOPY  09/02/2016    Nevarez, nonbleeding esophageal varicies and gastritis    UPPER GASTROINTESTINAL ENDOSCOPY  03/27/2018    UPPER GASTROINTESTINAL ENDOSCOPY N/A 3/27/2018    EGD BAND LIGATION performed by Michael Pavon MD at [facility] ENDOSCOPY       Medications Prior to Admission:    Prior to Admission medications    Medication Sig Start Date End Date Taking? Authorizing Provider   nadolol (CORGARD) 40 MG tablet Take 1 tablet by mouth daily 3/29/18   JUAN ALBERTO Prabhakar - CNP   nadolol (CORGARD) 40 MG tablet Take 1 tablet by mouth daily 10/19/16   Rex Bowden MD   ferrous sulfate (FE TABS) 325 (65 FE) MG EC tablet Take 1 tablet by mouth 2 times daily 10/19/16   Rex Bowden MD   sucralfate (CARAFATE) 1 GM tablet Take 1 tablet by mouth 4 times daily 10/19/16   Rex Bowden MD       Allergies:    Patient has no known allergies. Social History:    reports that he has never smoked. He has never used smokeless tobacco. He reports current alcohol use. He reports that he does not use drugs. Family History:   family history is not on file. PHYSICAL EXAM:  Vitals:  /76   Pulse 104   Temp 98.2 °F (36.8 °C)   Resp 16   Ht 6' (1.829 m)   Wt 185 lb (83.9 kg)   SpO2 99%   BMI 25.09 kg/m²      General Appearance: alert and oriented to person, place and time and in no acute distress  Skin: warm and dry  Head: normocephalic and atraumatic  Eyes: pupils equal, round, and reactive to light, extraocular eye movements intact, conjunctivae normal  Neck: neck supple and non tender without mass   Pulmonary/Chest: clear to auscultation bilaterally- no wheezes, rales or rhonchi, normal air movement, no respiratory distress  Cardiovascular: normal rate, normal S1 and S2 and no carotid bruits  Abdomen: soft, non-tender, non-distended, normal bowel sounds, no masses or organomegaly  Extremities: no cyanosis, no clubbing and no edema  Neurologic: no cranial nerve deficit and speech normal        LABS:  Recent Labs     10/13/20  1708      K 4.0      CO2 24   BUN 21   CREATININE 0.8   GLUCOSE 104*   CALCIUM 9.1       Recent Labs     10/13/20  1708   WBC 5.1   RBC 2.89*   HGB 6.3*   HCT 21.8*   MCV 75.4*   MCH 21.8*   MCHC 28.9*   RDW 21.9*   *   MPV 10.2       No results for input(s): POCGLU in the last 72 hours. Radiology:   XR CHEST (2 VW)   Final Result   No acute cardiopulmonary abnormality. EKG: Sinus tachycardia     ASSESSMENT:      Active Problems:    H/O esophageal varices  Resolved Problems:    * No resolved hospital problems. *    Upper GI bleed  hematemesis with nausea   Alcohol use disorder   Cirrhosis of liver     PLAN:    PPI drip, IV LR . Getting 2 unit prbc in ED. Recheck H.H . Octreotide drip. GI consult, intensivist consult  . Empiric rocephin    Code Status: full    DVT prophylaxis: SCD    Critical care time spent 40 minutes     NOTE: This report was transcribed using voice recognition software.  Every effort was made to ensure accuracy; however, inadvertent computerized transcription errors may be present.   Electronically signed by Alfonzo Ellis MD on 10/13/2020 at 8:45 PM

## 2020-10-14 NOTE — PLAN OF CARE
Intensive Care Daily Quality Rounding Checklist      ICU Team Members:     ICU Day #: 2    Intubation Date: N/A    Ventilator Day #: N/A    Central Line Insertion Date: N/A        Day #:      Arterial Line Insertion Date: N/A      Day #:     DVT Prophylaxis:pcd    GI Prophylaxis: Protonix gtt    Ramesh Catheter Insertion Date: N/A         Continued need (if yes, reason documented and discussed with physician):     Skin Issues/ Wounds and ordered treatment discussed on rounds:    Goals/ Plans for the Day: monitor labs, replace blood as needed, scope and follow up p/GI, check pt for COVID, CIWAW protocol,

## 2020-10-14 NOTE — BRIEF OP NOTE
Brief Postoperative Note    Julia Paz  YOB: 1955  [id number]    Procedure: EGD with biopsy    Anesthesia: [facility]    Surgeon:  Jossy Allison MD    Findings:       Esophagus:  GERD. GRADE 2/4 VARICES      Stomach:  Gastritis. PORTAL HTNSIVE GASTROPATHY CHANGES WITH OOZING FROM A SMALL VASCULAR AREA .  LENORA CLIP WAS APPLIED WITH EXCELLENT HEMOSTASIS    Duodenum:  Normal        Complications: None      Estimated blood loss: none      Ramandeep Lee MD

## 2020-10-15 NOTE — PROGRESS NOTES
[] pending)    VASOPRESSORS:  [x] No    [] Yes    If yes -   [] Levophed       [] Dopamine     [] Vasopressin       [] Dobutamine  [] Phenylephrine         [] Epinephrine    CENTRAL LINES:     [x] No   [] Yes   (Date of Insertion:   )           If yes -     [] Right IJ     [] Left IJ [] Right Femoral [] Left Femoral                   [] Right Subclavian [] Left Subclavian       MATOS'S CATHETER:   [x] No   [] Yes  (Date of Insertion:   )     URINE OUTPUT:            [x] Good   [] Low              [] Anuric      OBJECTIVE:     VITAL SIGNS:  BP (!) 122/53   Pulse 63   Temp 98 °F (36.7 °C) (Oral)   Resp 11   Ht 6' (1.829 m)   Wt 182 lb 15.7 oz (83 kg)   SpO2 97%   BMI 24.82 kg/m²   Tmax over 24 hours:  Temp (24hrs), Av.2 °F (36.8 °C), Min:97.9 °F (36.6 °C), Max:98.5 °F (36.9 °C)      Patient Vitals for the past 6 hrs:   BP Temp Temp src Pulse Resp   10/15/20 0500 (!) 122/53 -- -- 63 11   10/15/20 0400 (!) 119/33 98 °F (36.7 °C) Oral 73 16   10/15/20 0300 113/62 -- -- 88 17   10/15/20 0200 120/66 -- -- 66 21   10/15/20 0118 120/66 -- -- 69 --   10/15/20 0100 123/63 -- -- 73 13         Intake/Output Summary (Last 24 hours) at 10/15/2020 0627  Last data filed at 10/15/2020 0537  Gross per 24 hour   Intake 5485 ml   Output 1675 ml   Net 3810 ml     Wt Readings from Last 2 Encounters:   10/15/20 182 lb 15.7 oz (83 kg)   18 171 lb 12.8 oz (77.9 kg)     Body mass index is 24.82 kg/m². PHYSICAL EXAMINATION:  BP (!) 122/53   Pulse 63   Temp 98 °F (36.7 °C) (Oral)   Resp 11   Ht 6' (1.829 m)   Wt 182 lb 15.7 oz (83 kg)   SpO2 97%   BMI 24.82 kg/m²   PHYSICAL EXAM:     General appearance - alert, well appearing, and in no distress and oriented to person, place, and time.    Mental status -normal mood, behavior, speech  Eyes - pupils equal and reactive, extraocular eye movements intact, sclera anicteric, conjunctival pallor  Ears - external ear normal  Nose - normal and Assessments  Pulse: 63  Resp: 11  SpO2: 97 %    ABGs:     No results for input(s): PH, PCO2, PO2, HCO3, BE, O2SAT in the last 72 hours.       Laboratory findings:    Complete Blood Count:   Recent Labs     10/14/20  0540 10/14/20  1355 10/15/20  0005 10/15/20  0550   WBC 1.8* 1.4*  --  1.4*   HGB 7.1* 6.6* 8.1* 8.3*   HCT 23.3* 22.1* 25.4* 26.7*   PLT 52* 56*  --  48*        Last 3 Blood Glucose:   Recent Labs     10/13/20  1708 10/14/20  0540 10/15/20  0550   GLUCOSE 104* 102* 87        PT/INR:    Lab Results   Component Value Date    PROTIME 15.7 10/15/2020    INR 1.3 10/15/2020     PTT:    Lab Results   Component Value Date    APTT 32.9 10/13/2020       Comprehensive Metabolic Profile:   Recent Labs     10/13/20  1708 10/14/20  0540 10/15/20  0550    136 133   K 4.0 4.3  4.3 4.6    106 103   CO2 24 24 26   BUN 21 25* 18   CREATININE 0.8 1.0 1.1   GLUCOSE 104* 102* 87   CALCIUM 9.1 8.5* 8.2*   PROT 7.3 6.0* 6.1*   LABALBU 3.3* 2.8* 2.7*   BILITOT 1.7* 1.5* 1.4*   ALKPHOS 66 54 49   AST 41* 42* 64*   ALT 24 6 22      Magnesium:   Lab Results   Component Value Date    MG 1.8 03/28/2018     Phosphorus:   Lab Results   Component Value Date    PHOS 3.5 10/18/2016     Ionized Calcium: No results found for: CAION     Urinalysis:     Troponin:   Recent Labs     10/13/20  1708   TROPONINI <0.01       Microbiology:    Cultures during this admission:     Blood cultures:                 [] None drawn      [] Negative             []  Positive (Details:  )  Urine Culture:                   [] None drawn      [] Negative             []  Positive (Details:  )  Sputum Culture:               [] None drawn       [] Negative             []  Positive (Details:  )   Endotracheal aspirate:     [] None drawn       [] Negative             []  Positive (Details:  )     Other pertinent Labs:       Radiology/Imaging:   Ct Abdomen Pelvis W Wo Contrast Additional Contrast? None    Result Date: 10/14/2020  EXAMINATION: CT OF THE ABDOMEN AND PELVIS WITH AND WITHOUT CONTRAST 10/14/2020 4:50 pm TECHNIQUE: CT of the abdomen and pelvis was performed with and without the administration of intravenous contrast.  Multiplanar reformatted images are provided for review. Dose modulation, iterative reconstruction, and/or weight based adjustment of the mA/kV was utilized to reduce the radiation dose to as low as reasonably achievable. COMPARISON: None. HISTORY: ORDERING SYSTEM PROVIDED HISTORY: cirrhosis, gi bleed, TECHNOLOGIST PROVIDED HISTORY: Additional Contrast?->None Reason for exam:->cirrhosis, gi bleed, FINDINGS: Lower Chest: The lung bases are clear. The heart is normal in size. Small periesophageal varices are seen. Organs: Liver: The liver is mildly atrophic with an irregular contour consistent with cirrhosis. Along the inferior margin of the right lobe of the liver, there is a 5.4 x 5.3 x 6.3 cm rounded mass suspicious for malignancy. No biliary ductal dilatation. The appendix and portal veins are patent. Gallbladder: Cholelithiasis is seen. Gallbladder wall thickening is nonspecific in the setting of cirrhosis. Pancreas: Unremarkable. Spleen:  Prominently enlarged, measuring approximately 19.8 cm in length. Adrenals: Unremarkable. Kidneys: 2 mm nonobstructive calculus within an inferior pole calyx of the left kidney. Otherwise, unremarkable. GI/Bowel: No gross bowel wall thickening obstruction. Normal appendix. 2.0 x 0.2 cm linear density is seen within the stomach. Or body in the stomach seen on CT band ligation. Pelvis: The urinary bladder and the prostate are grossly unremarkable. Peritoneum/Retroperitoneum: Small volume ascites. No free air. No lymphadenopathy. Small varices are seen along the distal esophagus, gastroesophageal junction as well as the lesser curvature of the stomach. Varices are also seen along the splenic hilum. Bones/Soft Tissues:     1.  Cirrhotic liver with 5.4 x 5.3 x 6.3 cm mass in the right lobe suspicious for malignancy. No evidence of metastatic disease. 2. Splenomegaly, varices and small volume ascites consistent with portal hypertension. 3. Linear foreign body in the stomach, which may represent a surgical clip or an ingested foreign body. Clinical correlation is needed. 4. Cholelithiasis and left nephrolithiasis. Xr Chest (2 Vw)    Result Date: 10/13/2020  EXAMINATION: TWO XRAY VIEWS OF THE CHEST 10/13/2020 6:03 pm COMPARISON: October 15, 2016 HISTORY: ORDERING SYSTEM PROVIDED HISTORY: hematemesis,chest pain TECHNOLOGIST PROVIDED HISTORY: Reason for exam:->hematemesis,chest pain FINDINGS: The lungs are clear. The cardiomediastinal contour is unremarkable. No pneumothorax or pleural effusion is seen. The visualized bones are intact without fracture or focal lesion     No acute cardiopulmonary abnormality. Chest Xray (10/15/2020):    ASSESSMENT:        Neuro   Patient is alert and oriented x3     Chronic alcohol abuse  -Thiamine and folate daily     Mild tremors on exam  -CIWA protocol in place     Respiratory   Patient is saturating well on room air no shortness of breath  Wean oxygen as tolerated. Keep O2 sat 90-92%     Cardiovascular   Telemetry monitoring     Gastrointestinal   GI bleeding  Resolved  -On Protonix  -Stopped Octreotide today  -GI following     Portal hypertensive gastropathy  -GI following  -Endoscopy showed grade 2/4 esophageal varices, portal hypertensive gastropathy with oozing AVM. Hemoclip applied to AVM.       Hepatitis C  -Ordered acute hepatitis panel  -Alk phos 54, ALT 6, AST 42, bilirubin 1.5    Liver Mass  Likely malignant  -IR guided liver biopsy today        Renal   Creatinine of 1.0 and elevated BUN at 25  Continue trending CMP     Infectious Disease   SBP prophylaxis  -Rocephin day 2  -Talked to GI if able to be stopped today       Hematology/Oncology   Hemoglobin of 8.3 today  -Received 3 units of packed red blood cells  -Continue trending hemoglobin     Endocrine   Monitor blood sugar regularly  Keep glucose between 120 and 180     Social/Spiritual/DNR/Other   Full code  CLD, but needs to be NPO for liver biopsy      PLAN:     WEAN PER PROTOCOL:  [] No   [] Yes  [x] N/A    DISCONTINUE ANY LABS:   [x] No   [] Yes    ICU PROPHYLAXIS:  Stress ulcer:  [] PPI Agent  [] [id number] [] Sucralfate  [] Other:  VTE:   [] Enoxaparin  [] Unfract. Heparin Subcut  [] EPC Cuffs    NUTRITION:  [] NPO [] Tube Feeding (Specify: ) [] TPN  [x] PO (Diet: DIET CLEAR LIQUID;)    HOME MEDICATIONS RECONCILED: [x] No  [] Yes    INSULIN DRIP:   [x] No   [] Yes    CONSULTATION NEEDED:  [x] No   [] Yes    FAMILY UPDATED:    [] No   [x] Yes    TRANSFER OUT OF ICU:   [] No   [x] Yes    ADDITIONAL PLAN:    Transfer out of ICU today    Jeremias Baker MD, PGY-2                    10/15/2020, 6:27 AM     I personally saw, examined and provided care for the patient. Radiographs, labs and medication list were reviewed by me independently. I spoke with bedside nursing, therapists and consultants. Critical care services and times documented are independent of procedures and multidisciplinary rounds with Residents. Additionally comprehensive, multidisciplinary rounds were conducted with the MICU team. The case was discussed in detail and plans for care were established. Review of Residents documentation was conducted and revisions were made as appropriate. I agree with the above documented exam, problem list and plan of care.   Tran Hayes MD   CCT excluding procedures:38'

## 2020-10-15 NOTE — PROGRESS NOTES
Pt. Returned from IR awake alert and oriented without issues. VSS denies needs. Set up for lunch. Dressing to the back is clean dry and intact.

## 2020-10-15 NOTE — PLAN OF CARE
Intensive Care Daily Quality Rounding Checklist      ICU Team Members: Dr. iTff Penaloza, Dr. Ginger Cummings (resident), clinical pharmacist, charge nurse, bedside nurse    ICU Day #: 2    Intubation Date:  n/a    Ventilator Day #: n/a    Central Line Insertion Date: n/a        Day #: n/a     Arterial Line Insertion Date: n/a      Day #: n/a    DVT Prophylaxis: PCDs    GI Prophylaxis: Protonix    Ramesh Catheter Insertion Date: n/a       Day #: n/a      Continued need (if yes, reason documented and discussed with physician): n/a    Skin Issues/ Wounds and ordered treatment discussed on rounds: no issues    Goals/ Plans for the Day: Daily labs, advance diet and activity as tolerated, serial H&H q8hrs, transfer to floor, IR procedure for liver biopsy

## 2020-10-15 NOTE — PROGRESS NOTES
on CT abd/pelvis 10/15; given hx untreated HCV / cirrhosis likely [address] IR consulted for bx    Code status  Full   DVT prophylaxis PCDs  Disposition  To be determined    Electronically signed by Jennifer Valadez DO on 10/15/2020 at 1:27 PM

## 2020-10-15 NOTE — PROGRESS NOTES
IRFLOWSHEET    Date: 10/15/2020    Time: 2:37 PM     Exam: Image Guided Liver Biopsy    Radiologist Performing Procedure: Dr Tatiana Lenz    Nurse Reporting/Phone: [phone]     Nurse Receiving: Mariam Mcwilliams    Permit signed:      Yes    ID Band Checklist: Yes    Allergies: Patient has no known allergies. TIME OUT: 0283    PROCEDURE START TIME: 9402    Puncture Site: right abdomen    Puncture Time: 1457    Catheters: 18x10 Bard    LABS:   Lab Results   Component Value Date    INR 1.3 10/15/2020    PROTIME 15.7 (H) 10/15/2020           Lab Results   Component Value Date    CREATININE 1.1 10/15/2020    BUN 18 10/15/2020          Lab Results   Component Value Date    HGB 8.7 (L) 10/15/2020    HCT 27.7 (L) 10/15/2020    PLT 48 (L) 10/15/2020         PROCEDURE END TIME: 1510    Total Contrast: n/a    Fluoroscopy Time: n/a    Complications:  None    Comments: Procedure explained to pt by Dr Tatiana Lenz and Nurse and consent confirmed. Placed supine on CT table with monitor and oxygen for sedation. 5 core biopy. No complaints of pain report called to floor nurse.

## 2020-10-15 NOTE — PROGRESS NOTES
PROGRESS NOTE    Patient Presents with/Seen in Consultation For      *upper gi bleed, esophageal varices  CHIEF COMPLAINT:  Nausea, hematemesis, and dark stools    Subjective:     Patient off unit for liver biopsy. Chart/Labs/Rad reviewed. Review of Systems  Aside from what was mentioned in the PMH and HPI, essentially unremarkable, all others negative. Objective:     /82   Pulse 67   Temp 97.8 °F (36.6 °C) (Oral)   Resp 18   Ht 6' (1.829 m)   Wt 182 lb 15.7 oz (83 kg)   SpO2 100%   BMI 24.82 kg/m²     Deferred, pt off unit for bx.     [START ON 10/16/2020] vitamin B-1 (THIAMINE) tablet 100 mg, Daily  [START ON 89/30/6095] folic acid (FOLVITE) tablet 1 mg, Daily  cefTRIAXone (ROCEPHIN) 1 g in sterile water 10 mL IV syringe, Q24H  sodium chloride flush 0.9 % injection 10 mL, 2 times per day  sodium chloride flush 0.9 % injection 10 mL, PRN  LORazepam (ATIVAN) tablet 1 mg, Q1H PRN    Or  LORazepam (ATIVAN) injection 1 mg, Q1H PRN    Or  LORazepam (ATIVAN) tablet 2 mg, Q1H PRN    Or  LORazepam (ATIVAN) injection 2 mg, Q1H PRN    Or  LORazepam (ATIVAN) tablet 3 mg, Q1H PRN    Or  LORazepam (ATIVAN) injection 3 mg, Q1H PRN    Or  LORazepam (ATIVAN) tablet 4 mg, Q1H PRN    Or  LORazepam (ATIVAN) injection 4 mg, Q1H PRN  sodium chloride (PF) 0.9 % injection 10 mL, BID    And  pantoprazole (PROTONIX) injection 40 mg, BID  acetaminophen (TYLENOL) tablet 650 mg, Q6H PRN    Or  acetaminophen (TYLENOL) suppository 650 mg, Q6H PRN  polyethylene glycol (GLYCOLAX) packet 17 g, Daily PRN  promethazine (PHENERGAN) tablet 12.5 mg, Q6H PRN    Or  ondansetron (ZOFRAN) injection 4 mg, Q6H PRN  lactated ringers infusion, Continuous         Data Review  CBC:   Lab Results   Component Value Date    WBC 1.4 10/15/2020    RBC 3.32 10/15/2020    RBC  09/01/2016      RBC           [id number]    transfused   09/02/16  00:19  SCC    RBC  09/01/2016      RBC           [id number]    transfused   09/02/16  19:47 [facility]    HGB 8.7 10/15/2020    HCT 27.7 10/15/2020    MCV 80.4 10/15/2020    MCH 25.0 10/15/2020    MCHC 31.1 10/15/2020    RDW 21.1 10/15/2020    PLT 48 10/15/2020    MPV NOT CALC 10/15/2020     CMP:    Lab Results   Component Value Date     10/15/2020    K 4.6 10/15/2020    K 4.3 10/14/2020     10/15/2020    CO2 26 10/15/2020    BUN 18 10/15/2020    CREATININE 1.1 10/15/2020    GFRAA >60 10/15/2020    LABGLOM >60 10/15/2020    GLUCOSE 87 10/15/2020    PROT 6.1 10/15/2020    LABALBU 2.7 10/15/2020    CALCIUM 8.2 10/15/2020    BILITOT 1.4 10/15/2020    ALKPHOS 49 10/15/2020    AST 64 10/15/2020    ALT 22 10/15/2020     Hepatic Function Panel:    Lab Results   Component Value Date    ALKPHOS 49 10/15/2020    ALT 22 10/15/2020    AST 64 10/15/2020    PROT 6.1 10/15/2020    BILITOT 1.4 10/15/2020    BILIDIR 0.3 10/15/2020    IBILI 1.1 10/15/2020    LABALBU 2.7 10/15/2020     No components found for: CHLPL    Lab Results   Component Value Date    TRIG 72 04/30/2013       Lab Results   Component Value Date    HDL 78.8 04/30/2013       Lab Results   Component Value Date    LDLCALC 46 04/30/2013     No results found for: LABVLDL   PT/INR:    Lab Results   Component Value Date    PROTIME 15.7 10/15/2020    INR 1.3 10/15/2020     IRON:    Lab Results   Component Value Date    IRON 31 10/18/2016       FERRITIN:    Lab Results   Component Value Date    FERRITIN 22 10/18/2016         Assessment:     ? 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 Anemia, microcytic, hypochromic  ? Cirrhosis of liver, alcohol and hepatitis c  ? Esophageal varices  ? Abnormal lfts  ? Nausea and vomiting  ? Dizziness   ? Alcohol abuse   ? Chronic Hepatitis C  ? EGD 10/14/2020Simeon Ketan, Grade 2/4 varices, portal hypertensive gastropathy changes with AVM and hemiclip  ? Liver mass, worrisome for HCC - bx ordered    Plan:     ? IR CT Liver biopsy of mass  ? Critical Care management per ICU  ?  DC Octreotide and Protonix gtts as ordered  ? Protonix IV 40 mg bid  ? CT Abd/pelvis IV contrast- results noted   ? Medicate for nausea as ordered  ? Serial H&H, transfuse per ICU  ? Monitor CMP, CBC, PT INR daily  ? Alcohol Cessation  ? Supportive care  ? Continue to monitor      Note: This report was completed utilizing computer voice recognition software. Every effort has been made to ensure accuracy, however; inadvertent computerized transcription errors may be present.      Discussed with Dr. Enoch Yadav per Dr. Mac Ha [id number], FNP-BC 10/15/2020 6:28 PM For Dr. Angel Brewer

## 2020-10-15 NOTE — OP NOTE
[address] [address]                                OPERATIVE REPORT    PATIENT NAME: Cynthia Solomon                        :        1955  MED REC NO:   [id number]                            ROOM:       0210  ACCOUNT NO:   [de-identified]                           ADMIT DATE: 10/13/2020  PROVIDER:     Ace Solomon MD    DATE OF PROCEDURE:  10/14/2020    PROCEDURES PERFORMED:  Upper endoscopy with hemoclip application. PREOPERATIVE DIAGNOSIS:  Patient with liver cirrhosis was admitted with  hematemesis and melena. Procedure is done for evaluation for GI bleed. POSTOPERATIVE DIAGNOSES:  Grade 2/4 esophageal varices, portal  hypertensive gastropathy changes with spot that was oozing consistent  with AVM. Hemoclip was applied with good hemostasis. No biopsies were  done in view of patient's coagulopathy. Duodenum showed superficial  duodenitis; however, no active bleeding was seen. ANESTHESIA:  LMAC. ESTIMATED BLOOD LOSS:  N/A    NOTE:  Prior to the procedure an informed consent was obtained from the  patient after explaining the benefits as well as the risks,  alternatives, and complications of the procedure to the patient, who  understood and agreed. PROCEDURE:  With the patient in the left lateral decubitus position, the  Olympus GIF-100 forward-viewing videoscope was introduced into the  esophagus, the evaluation of which showed grade 2/4 esophageal varices. No bleeding was seen. The scope was then advanced through the gastroesophageal junction into  the gastric body, along the greater curvature. Evaluation of the body  of the stomach showed portal hypertensive gastropathy changes and oozing  from an AV malformation at the cardia of the stomach. Hemoclip applied  with excellent hemostasis. Biopsies were taken from this area to rule  out Helicobacter pylori infection.     The scope was then advanced through the pylorus into the duodenal bulb  and second portion of the duodenum, which showed mild duodenitis. No  active bleeding was seen. The scope was then retrieved and retroflexed  in the prepyloric antrum, with thorough evaluation of the cardiac and  fundal portions of the stomach, which appeared to be within normal  limits. The scope was then straightened, the area deflated, and the procedure  was terminated by withdrawing the scope and conducting a second look on  the way out, which was essentially the same. The patient tolerated the procedure well.         Adeline Aguiar MD    D: 10/14/2020 16:01:48       T: 10/14/2020 16:08:53     [email or web address]  Job#: [id number]     Doc#: [id number]    CC:  Claudia Borrego MD

## 2020-10-16 NOTE — PATIENT CARE CONFERENCE
Intensive Care Daily Quality Rounding Checklist        ICU Team Members: Charge nurse, bedside nurse     ICU Day #: 2     Intubation Date:  n/a     Ventilator Day #: n/a     Central Line Insertion Date: n/a                                              Day #: n/a      Arterial Line Insertion Date: n/a                             Day #: n/a     DVT Prophylaxis: PCDs    GI Prophylaxis: Protonix     Ramesh Catheter Insertion Date: n/a                               Day #: n/a                             Continued need (if yes, reason documented and discussed with physician): n/a     Skin Issues/ Wounds and ordered treatment discussed on rounds: no issues     Goals/ Plans for the Day: Daily labs, up ad haider, discharge soon

## 2020-10-16 NOTE — PROGRESS NOTES
PROGRESS NOTE    Patient Presents with/Seen in Consultation For      *upper gi bleed, esophageal varices  CHIEF COMPLAINT:  Nausea, hematemesis, and dark stools    Subjective:     Patient seen laying in bed, in NAD. Awaiting bed to transfer out of ICU. States he is feeling well, asking about returning home. Denies any N/V. Tolerated his meal this AM. Has had no BM for 2 days, +flatus. Denies feeling constipated. POC reviewed with the patient, all questions answered. Review of Systems  Aside from what was mentioned in the PMH and HPI, essentially unremarkable, all others negative. Objective:     /68   Pulse 94   Temp 97.6 °F (36.4 °C) (Axillary)   Resp 14   Ht 6' (1.829 m)   Wt 185 lb 6.5 oz (84.1 kg)   SpO2 100%   BMI 25.15 kg/m²     CONSTITUTIONAL:  awake, alert, pale, cooperative, no apparent distress, and appears stated age  EYES:  pupils equal, round and reactive to light, sclera anicteric and conjunctiva pale  ENT:  normocephalic, oral pharynx with moist mucous membranes  LUNGS:  clear to auscultation bilaterally.   CARDIOVASCULAR:  regular rate and rhythm, no muurmur noted; 2+ pulses; no edema  ABDOMEN:  normal bowel sounds, soft, non-distended, non-tender, no masses palpated, no hepatosplenomegally  MUSCULOSKELETAL:  full range of motion noted  motor strength is 5 out of 5 all extremities bilaterally  NEUROLOGIC:  Mental Status Exam:  Level of Alertness:   awake  Orientation:   person, place, time  Motor Exam:  Motor exam is symmetrical 5 out of 5 all extremities bilaterally  SKIN:  pale skin color, normal texture, turgor    vitamin B-1 (THIAMINE) tablet 890 mg, Daily  folic acid (FOLVITE) tablet 1 mg, Daily  cefTRIAXone (ROCEPHIN) 1 g in sterile water 10 mL IV syringe, Q24H  sodium chloride flush 0.9 % injection 10 mL, 2 times per day  sodium chloride flush 0.9 % injection 10 mL, PRN  LORazepam (ATIVAN) tablet 1 mg, Q1H PRN    Or  LORazepam (ATIVAN) injection 1 mg, Q1H PRN    Or  LORazepam (ATIVAN) tablet 2 mg, Q1H PRN    Or  LORazepam (ATIVAN) injection 2 mg, Q1H PRN    Or  LORazepam (ATIVAN) tablet 3 mg, Q1H PRN    Or  LORazepam (ATIVAN) injection 3 mg, Q1H PRN    Or  LORazepam (ATIVAN) tablet 4 mg, Q1H PRN    Or  LORazepam (ATIVAN) injection 4 mg, Q1H PRN  sodium chloride (PF) 0.9 % injection 10 mL, BID    And  pantoprazole (PROTONIX) injection 40 mg, BID  acetaminophen (TYLENOL) tablet 650 mg, Q6H PRN    Or  acetaminophen (TYLENOL) suppository 650 mg, Q6H PRN  polyethylene glycol (GLYCOLAX) packet 17 g, Daily PRN  promethazine (PHENERGAN) tablet 12.5 mg, Q6H PRN    Or  ondansetron (ZOFRAN) injection 4 mg, Q6H PRN         Data Review  CBC:   Lab Results   Component Value Date    WBC 1.1 10/16/2020    RBC 3.19 10/16/2020    RBC  09/01/2016      RBC           [id number]    transfused   09/02/16  00:19  SCC    RBC  09/01/2016      RBC           [id number]    transfused   09/02/16  19:47  SCC    HGB 8.1 10/16/2020    HCT 25.9 10/16/2020    MCV 81.2 10/16/2020    MCH 25.4 10/16/2020    MCHC 31.3 10/16/2020    RDW 21.3 10/16/2020    PLT 49 10/16/2020    MPV 10.8 10/16/2020     CMP:    Lab Results   Component Value Date     10/16/2020    K 3.5 10/16/2020    K 4.3 10/14/2020     10/16/2020    CO2 25 10/16/2020    BUN 11 10/16/2020    CREATININE 1.0 10/16/2020    GFRAA >60 10/16/2020    LABGLOM >60 10/16/2020    GLUCOSE 101 10/16/2020    PROT 5.8 10/16/2020    LABALBU 2.5 10/16/2020    CALCIUM 8.0 10/16/2020    BILITOT 1.0 10/16/2020    ALKPHOS 60 10/16/2020    AST 40 10/16/2020    ALT 18 10/16/2020     Hepatic Function Panel:    Lab Results   Component Value Date    ALKPHOS 60 10/16/2020    ALT 18 10/16/2020    AST 40 10/16/2020    PROT 5.8 10/16/2020    BILITOT 1.0 10/16/2020    BILIDIR 0.4 10/16/2020    IBILI 0.6 10/16/2020    LABALBU 2.5 10/16/2020     No components found for: CHLPLat  Lab Results   Component Value Date    TRIG 72 04/30/2013   e    HDL 78.8 04/30/2013 04/30/2013     No results found for: LABVLDL   PT/INR:    Lab Results   Component Value Date    PROTIME 17.4 10/16/2020    INR 1.5 10/16/2020     IRON:    Lab Results   Component Value Date    IRON 31 10/18/2016       FERRITIN:    Lab Results   Component Value Date    FERRITIN 22 10/18/2016     Ct Abdomen Pelvis W Wo Contrast Additional Contrast? None    Result Date: 10/14/2020  EXAMINATION: CT OF THE ABDOMEN AND PELVIS WITH AND WITHOUT CONTRAST 10/14/2020 4:50 pm TECHNIQUE: CT of the abdomen and pelvis was performed with and without the administration of intravenous contrast.  Multiplanar reformatted images are provided for review. Dose modulation, iterative reconstruction, and/or weight based adjustment of the mA/kV was utilized to reduce the radiation dose to as low as reasonably achievable. COMPARISON: None. HISTORY: ORDERING SYSTEM PROVIDED HISTORY: cirrhosis, gi bleed, TECHNOLOGIST PROVIDED HISTORY: Additional Contrast?->None Reason for exam:->cirrhosis, gi bleed, FINDINGS: Lower Chest: The lung bases are clear. The heart is normal in size. Small periesophageal varices are seen. Organs: Liver: The liver is mildly atrophic with an irregular contour consistent with cirrhosis. Along the inferior margin of the right lobe of the liver, there is a 5.4 x 5.3 x 6.3 cm rounded mass suspicious for malignancy. No biliary ductal dilatation. The appendix and portal veins are patent. Gallbladder: Cholelithiasis is seen. Gallbladder wall thickening is nonspecific in the setting of cirrhosis. Pancreas: Unremarkable. Spleen:  Prominently enlarged, measuring approximately 19.8 cm in length. Adrenals: Unremarkable. Kidneys: 2 mm nonobstructive calculus within an inferior pole calyx of the left kidney. Otherwise, unremarkable. GI/Bowel: No gross bowel wall thickening obstruction. Normal appendix. 2.0 x 0.2 cm linear density is seen within the stomach. Or body in the stomach seen on CT band ligation. Pelvis:  The urinary bladder and the prostate are grossly unremarkable. Peritoneum/Retroperitoneum: Small volume ascites. No free air. No lymphadenopathy. Small varices are seen along the distal esophagus, gastroesophageal junction as well as the lesser curvature of the stomach. Varices are also seen along the splenic hilum. Bones/Soft Tissues:     1. Cirrhotic liver with 5.4 x 5.3 x 6.3 cm mass in the right lobe suspicious for malignancy. No evidence of metastatic disease. 2. Splenomegaly, varices and small volume ascites consistent with portal hypertension. 3. Linear foreign body in the stomach, which may represent a surgical clip or an ingested foreign body. Clinical correlation is needed. 4. Cholelithiasis and left nephrolithiasis. Ct Needle Biopsy Liver Percutaneous    Result Date: 10/15/2020  PROCEDURE: CT NEEDLE BIOPSY LIVER PERCUTANEOUS MODERATE CONSCIOUS SEDATION 10/15/2020 HISTORY: ORDERING SYSTEM PROVIDED HISTORY: liver bx TECHNOLOGIST PROVIDED HISTORY: Reason for exam:->liver bx TECHNIQUE: Dose modulation, iterative reconstruction, and/or weight based adjustment of the mA/kV was utilized to reduce the radiation dose to as low as reasonably achievable. CONTRAST: 50 cc of iodinated contrast was administered intravenously at the onset of the procedure. SEDATION: 1 mgversed and 50 mcg fentanyl were titrated intravenously for moderate sedation monitored under my direction. Total intraservice time of sedation was 20 minutes. The patient's vital signs were monitored throughout the procedure and recorded in the patient's medical record by the nurse. FLUOROSCOPY DOSE AND TYPE OR TIME AND EXPOSURES: No fluoroscopy time was utilized. DESCRIPTION OF PROCEDURE: Informed consent was obtained after a detailed explanation of the procedure including risks, benefits, and alternatives. Universal protocol was observed.   The patient was placed supine on the CT table and radiopaque grade affixed to the patient's right upper abdomen laterally. A skin entrance site was selected and the skin was marked, prepped and draped in usual sterile fashion (maximum sterile barrier technique). 2% lidocaine was used as local anesthetic. Under intermittent CT guidance, a 17 gauge guide needle was advanced into the enhancing mass in the inferior aspect of the right hepatic lobe. When needle positioning was confirmed to be within the mass, a total of five 18 gauge core biopsy samples were obtained and placed directly into formalin. The samples were taken directly to the pathology department after the procedure. The needle was subsequently removed and a completion CT scan performed. This demonstrated no evidence of significant hemorrhage or other obvious complication of the procedure. A sterile dressing was placed over the skin entrance site. The patient tolerated the procedure well there were no immediate complications. FINDINGS: Large, heterogeneously enhancing mass in the inferior aspect of the right hepatic lobe. Status post CT-guided biopsy of a heterogeneous but predominantly hypodense mass in the inferior aspect of the right hepatic lobe, as described. Assessment:     ? 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 Anemia, microcytic, hypochromic  ? Cirrhosis of liver, alcohol and hepatitis c  ? Esophageal varices  ? Abnormal lfts  ? Nausea and vomiting  ? Dizziness   ? Alcohol abuse   ? Chronic Hepatitis C  ? EGD 10/14/2020Jeolayinka Lantigua, Grade 2/4 varices, portal hypertensive gastropathy changes with AVM and hemiclip  ? Liver mass, worrisome for [address] - bx ordered      Plan:     ? Liver biopsy- pending  ? Protonix IV 40 mg bid  ? Medicate for nausea as ordered  ? Monitor CMP, CBC, PT INR daily  ? Alcohol Cessation  ? Supportive care  ? Continue to monitor      Discussed with Dr. Sterling Roman per Dr. Karina Romero NP-C 10/16/2020 12:00 PM For Dr. Hernandez Haynes.  I HAD A FACE TO FACE

## 2020-10-16 NOTE — CARE COORDINATION
COVID negative 10/14. Downgraded to intermediate LOC yesterday. S/p EGD-varices w/ AVM clipping,  liver biopsy per IR yesterday- await pathology. Plan remains to return home on discharge-states has no needs. Hx ETOH- drinks 6 beers /day- refusing addiction recovery resource/ peer recovery information. Currently on iv abx.  Will follow Patrice De Los Santos

## 2020-10-16 NOTE — PLAN OF CARE
Problem: Discharge Planning:  Goal: Participates in care planning  Description: Participates in care planning  Outcome: Met This Shift  Goal: Discharged to appropriate level of care  Description: Discharged to appropriate level of care  Outcome: Met This Shift     Problem: Fluid Volume - Imbalance:  Goal: Absence of imbalanced fluid volume signs and symptoms  Description: Absence of imbalanced fluid volume signs and symptoms  Outcome: Met This Shift     Problem: Tissue Perfusion, Altered:  Goal: Circulatory function within specified parameters  Description: Circulatory function within specified parameters  Outcome: Met This Shift

## 2020-10-16 NOTE — PROGRESS NOTES
[facility]) Hospitalist Progress Note    Admission Date  10/13/2020  5:44 PM  Chief Complaint Hematemesis, dizziness  Admit Dx   H/O esophageal varices [Z87.19]    Subjective  History of Present Illness  10/15 Yuliana Barber is a 65M w PMH HCV untreated, cirrhosis, esophageal varices with past bleed, continued EtOH abuse who was admitted 10/13 with hematemesis and dark stools and found to be with hgb 7. Pt was admitted to ICU, started on PPI and octreotide gtt. EGD 10/14 showed grade 2/4 varices and portal hypertensive gastropathy with AVM that was clipped. Now off octreotide gtt. CT abd/pelv ordered which unfortunately showed ~5x5x6 cm R hepatic lobe mass, and IR has been consulted for biopsy for tissue diagnosis. Pt for tx out of ICU today. 10/16 Pt seen at bedside, feeling well, no significant abdominal pain or nausea. Says he is feeling largely back to baseline. He is 1 day s/p EGD w AVM clipping and also liver biopsy. Awaiting path but pt does not necessarily need to be here for that. On IV Rocephin per critical care and IV Protonix, otherwise no acute interventions. I feel pt would be stable for dc once off these. Was downgraded from ICU status yesterday.     Review of Systems - 12-point review of systems has been reviewed and is otherwise negative except as listed in the HPI    Objective  Physical Exam  Vitals: /68   Pulse 94   Temp 97.6 °F (36.4 °C) (Axillary)   Resp 14   Ht 6' (1.829 m)   Wt 185 lb 6.5 oz (84.1 kg)   SpO2 100%   BMI 25.15 kg/m²   General: well-developed, well-nourished, no acute distress, cooperative  Skin: warm, dry, intact, normal color without cyanosis  HEENT: normocephalic, atraumatic, mucous membranes normal  Respiratory: clear to auscultation bilaterally without respiratory distress  Cardiovascular: regular rate and rhythm without murmur / rub / gallop  Abdominal: soft, nontender, nondistended, normoactive bowel sounds  Extremities: no mottling, pulses intact, no edema  Neurologic: awake, alert, no focal deficits  Psychiatric: normal affect, cooperative    Assessment / Plan  Acute anemia d/t GI bleed - hx HCV, cirrhosis, varices, continued EtOH abuse; PPI / octreotide gtts - octreotide now stopped and Protonix now IVP; transfuse to keep hgb >7, has needed 5 U so far; hold chemical DVT ppx; GI on and EGD 10/15 showed grade 2/4 varices and AVM that was clipped.   Hgb 8.1 this AM.  F/u GI recs    Liver mass - ~5x5x6 cm R hepatic lobe mass noted on CT abd/pelvis 10/15; given hx untreated HCV / cirrhosis likely HCC; s/p biopsy by IR 10/15    Thrombocytopenia, chronic - plt 49 this AM, monitor on CBC    Code status  Full   DVT prophylaxis PCDs  Disposition  To be determined    Electronically signed by Odell Wells DO on 10/16/2020 at 12:16 PM

## 2020-10-17 NOTE — PROGRESS NOTES
PROGRESS NOTE    Patient Presents with/Seen in Consultation For      *upper gi bleed, esophageal varices  CHIEF COMPLAINT:  Nausea, hematemesis, and dark stools    Subjective:     Patient seen up in bathroom getting cleaned up. States he is feeling well, no complaints at this time. Tolerating meals. Had a BM yesterday \"formed, dark\". Denies black/blood appearance. POC reviewed with the patient, all questions answered. Review of Systems  Aside from what was mentioned in the PMH and HPI, essentially unremarkable, all others negative. Objective:     /68   Pulse 94   Temp 97.6 °F (36.4 °C) (Axillary)   Resp 14   Ht 6' (1.829 m)   Wt 185 lb 6.5 oz (84.1 kg)   SpO2 100%   BMI 25.15 kg/m²     CONSTITUTIONAL:  awake, alert, pale, cooperative, no apparent distress, and appears stated age,up in bathroom brushing teeth  EYES:  pupils equal, round and reactive to light, sclera anicteric and conjunctiva pale  ENT:  normocephalic, oral pharynx with moist mucous membranes  LUNGS:  clear to auscultation bilaterally.   CARDIOVASCULAR:  regular rate and rhythm, no muurmur noted; 2+ pulses; no edema  ABDOMEN:  normal bowel sounds, soft, non-distended, non-tender, no masses palpated, no hepatosplenomegally  MUSCULOSKELETAL:  full range of motion noted  motor strength is 5 out of 5 all extremities bilaterally  NEUROLOGIC:  Mental Status Exam:  Level of Alertness:   awake  Orientation:   person, place, time  Motor Exam:  Motor exam is symmetrical 5 out of 5 all extremities bilaterally  SKIN:  pale skin color, normal texture, turgor    vitamin B-1 (THIAMINE) tablet 907 mg, Daily  folic acid (FOLVITE) tablet 1 mg, Daily  cefTRIAXone (ROCEPHIN) 1 g in sterile water 10 mL IV syringe, Q24H  sodium chloride flush 0.9 % injection 10 mL, 2 times per day  sodium chloride flush 0.9 % injection 10 mL, PRN  LORazepam (ATIVAN) tablet 1 mg, Q1H PRN    Or  LORazepam (ATIVAN) injection 1 mg, Q1H PRN    Or  LORazepam (ATIVAN) tablet 2 mg, Q1H PRN    Or  LORazepam (ATIVAN) injection 2 mg, Q1H PRN    Or  LORazepam (ATIVAN) tablet 3 mg, Q1H PRN    Or  LORazepam (ATIVAN) injection 3 mg, Q1H PRN    Or  LORazepam (ATIVAN) tablet 4 mg, Q1H PRN    Or  LORazepam (ATIVAN) injection 4 mg, Q1H PRN  sodium chloride (PF) 0.9 % injection 10 mL, BID    And  pantoprazole (PROTONIX) injection 40 mg, BID  acetaminophen (TYLENOL) tablet 650 mg, Q6H PRN    Or  acetaminophen (TYLENOL) suppository 650 mg, Q6H PRN  polyethylene glycol (GLYCOLAX) packet 17 g, Daily PRN  promethazine (PHENERGAN) tablet 12.5 mg, Q6H PRN    Or  ondansetron (ZOFRAN) injection 4 mg, Q6H PRN         Data Review  CBC:   Lab Results   Component Value Date    WBC 1.1 10/16/2020    RBC 3.19 10/16/2020    RBC  09/01/2016      RBC           [id number]    transfused   09/02/16  00:19  SCC    RBC  09/01/2016      RBC           [id number]    transfused   09/02/16  19:47  [facility]    HGB 8.1 10/16/2020    HCT 25.9 10/16/2020    MCV 81.2 10/16/2020    MCH 25.4 10/16/2020    MCHC 31.3 10/16/2020    RDW 21.3 10/16/2020    PLT 49 10/16/2020    MPV 10.8 10/16/2020     CMP:    Lab Results   Component Value Date     10/16/2020    K 3.5 10/16/2020    K 4.3 10/14/2020     10/16/2020    CO2 25 10/16/2020    BUN 11 10/16/2020    CREATININE 1.0 10/16/2020    GFRAA >60 10/16/2020    LABGLOM >60 10/16/2020    GLUCOSE 101 10/16/2020    PROT 5.8 10/16/2020    LABALBU 2.5 10/16/2020    CALCIUM 8.0 10/16/2020    BILITOT 1.0 10/16/2020    ALKPHOS 60 10/16/2020    AST 40 10/16/2020    ALT 18 10/16/2020     Hepatic Function Panel:    Lab Results   Component Value Date    ALKPHOS 60 10/16/2020    ALT 18 10/16/2020    AST 40 10/16/2020    PROT 5.8 10/16/2020    BILITOT 1.0 10/16/2020    BILIDIR 0.4 10/16/2020    IBILI 0.6 10/16/2020    LABALBU 2.5 10/16/2020     No components found for: CHLPLat  Lab Results   Component Value Date    TRIG 72 04/30/2013   e    HDL 78.8 04/30/2013 04/30/2013 No results found for: LABVLDL   PT/INR:    Lab Results   Component Value Date    PROTIME 17.4 10/16/2020    INR 1.5 10/16/2020     IRON:    Lab Results   Component Value Date    IRON 31 10/18/2016       FERRITIN:    Lab Results   Component Value Date    FERRITIN 22 10/18/2016       Assessment:     ? 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 Neutropenia   ? Cirrhosis of liver, alcohol and hepatitis c  ? Esophageal varices  ? Abnormal lfts  ? Nausea and vomiting  ? Dizziness   ? Alcohol abuse   ? Chronic Hepatitis C  ? EGD 10/14/2020Marylouise Lay, Grade 2/4 varices, portal hypertensive gastropathy changes with AVM and hemiclip  ? Liver mass, worrisome for [address] - bx ordered      Plan:     ? Liver biopsy- pending  ? Low fiber diet as tolerated   ? Protonix IV 40 mg bid  ? Medicate for nausea as ordered  ? Monitor CMP, CBC, PT INR daily  ? Alcohol Cessation  ? Supportive care  ?  Continue to monitor      Discussed with Dr. Kalia Marquez per Dr. Nayeli Bruno NP-C 10/17/2020 9:15 AM For Dr. Scar Gomes

## 2020-10-17 NOTE — PROGRESS NOTES
Call placed to Dr. Dalila Kramer via answering service to check discharge. Awaiting call back/ new orders.

## 2020-10-17 NOTE — PLAN OF CARE
Problem: Fluid Volume - Imbalance:  Goal: Absence of imbalanced fluid volume signs and symptoms  Description: Absence of imbalanced fluid volume signs and symptoms  Outcome: Met This Shift     Problem: Tissue Perfusion, Altered:  Goal: Circulatory function within specified parameters  Description: Circulatory function within specified parameters  Outcome: Met This Shift

## 2020-10-17 NOTE — DISCHARGE SUMMARY
[facility] Physician Discharge Summary     MD [id number]  [phone]    Activity level   As tolerated    Disposition   Home      Condition on discharge Stable    Patient ID   Chance  y. o.male /  1955  / MRN [id number]    Admit date   10/13/2020    Discharge date  10/17/2020  2:35 PM    Admission diagnoses Active Problems:    H/O esophageal varices  Resolved Problems:    * No resolved hospital problems. *    Discharge diagnoses Same    Consults   IP CONSULT TO CRITICAL CARE  IP CONSULT TO GI  IP CONSULT TO CRITICAL CARE  IP CONSULT TO SOCIAL WORK    Procedures   See hospital course    Hospital Course  Hemal Minor is a 70M w PMH HCV untreated, cirrhosis, esophageal varices with past bleed, continued EtOH abuse who was admitted 10/13 with hematemesis and dark stools and found to be with hgb 7. Pt was admitted to ICU, started on PPI and octreotide gtt. EGD 10/14 showed grade 2/4 varices and portal hypertensive gastropathy with AVM that was clipped. Acute anemia d/t GI bleed - hx HCV, cirrhosis, varices, continued EtOH abuse; PPI / octreotide gtts - octreotide now stopped and Protonix now IVP; transfuse to keep hgb >7, has needed 5 U so far; hold chemical DVT ppx; GI on and EGD 10/15 showed grade 2/4 varices and AVM that was clipped.   Hgb 8.1 this AM.  F/u GI recs     Liver mass - ~5x5x6 cm R hepatic lobe mass noted on CT abd/pelvis 10/15; given hx untreated HCV / cirrhosis likely HCC; s/p biopsy by IR 10/15 - will need outpt f/u for this     Thrombocytopenia, chronic - plt 49 this AM, monitor on CBC    Discharge Exam  /60   Pulse 82   Temp 97.5 °F (36.4 °C) (Oral)   Resp 16   Ht 6' (1.829 m)   Wt 183 lb 14.4 oz (83.4 kg)   SpO2 100%   BMI 24.94 kg/m²   General Appearance: alert and oriented to person, place and time and in no acute distress  Skin: warm and dry  Head: normocephalic and atraumatic  Eyes: pupils equal, round, and reactive to light, extraocular eye movements intact, conjunctivae normal  Neck: neck supple and non tender without mass   Pulmonary/Chest: clear to auscultation bilaterally- no wheezes, rales or rhonchi, normal air movement, no respiratory distress  Cardiovascular: normal rate, normal S1 and S2 and no carotid bruits  Abdomen: soft, non-tender, non-distended, normal bowel sounds, no masses or organomegaly  Extremities: no cyanosis, no clubbing and no edema  Neurologic: no cranial nerve deficit and speech normal    I/O last 3 completed shifts: In: 360 [P.O.:360]  Out: 700 [Urine:700]  I/O this shift:  In: 240 [P.O.:240]  Out: -     Labs  Recent Labs     10/15/20  0550 10/15/20  1422 10/16/20  0631 10/17/20  0345     --  136 136   K 4.6  --  3.5 3.3*     --  106 109*   CO2 26  --  25 23   BUN 18  --  11 9   CREATININE 1.1  --  1.0 0.9   GLUCOSE 87  --  101* 91   CALCIUM 8.2*  --  8.0* 8.0*   WBC 1.4*  --  1.1* 1.2*   RBC 3.32*  --  3.19* 3.17*   HGB 8.3* 8.7* 8.1* 8.0*   HCT 26.7* 27.7* 25.9* 26.2*   MCV 80.4  --  81.2 82.6   MCH 25.0*  --  25.4* 25.2*   MCHC 31.1*  --  31.3* 30.5*   RDW 21.1*  --  21.3* 21.9*   PLT 48*  --  49* 49*   MPV NOT CALC  --  10.8 11.0       Imaging  Ct Abdomen Pelvis W Wo Contrast Additional Contrast? None  Result Date: 10/14/2020  1. Cirrhotic liver with 5.4 x 5.3 x 6.3 cm mass in the right lobe suspicious for malignancy. No evidence of metastatic disease. 2. Splenomegaly, varices and small volume ascites consistent with portal hypertension. 3. Linear foreign body in the stomach, which may represent a surgical clip or an ingested foreign body. Clinical correlation is needed. 4. Cholelithiasis and left nephrolithiasis. Xr Chest (2 Vw)  Result Date: 10/13/2020  No acute cardiopulmonary abnormality.      Ct Guided Needle Placement  Result Date: 10/15/2020  Status post CT-guided biopsy of a heterogeneous but predominantly hypodense mass in the inferior aspect of the right hepatic lobe, as described. Ct Needle Biopsy Liver Percutaneous  Result Date: 10/15/2020  Status post CT-guided biopsy of a heterogeneous but predominantly hypodense mass in the inferior aspect of the right hepatic lobe, as described. Patient Instructions     Medication List      START taking these medications    folic acid 1 MG tablet  Commonly known as:  FOLVITE  Take 1 tablet by mouth daily  Start taking on:  October 18, 2020     thiamine 100 MG tablet  Take 1 tablet by mouth daily  Start taking on:  October 18, 2020        CHANGE how you take these medications    nadolol 40 MG tablet  Commonly known as:  Corgard  Take 1 tablet by mouth daily  What changed:  Another medication with the same name was removed. Continue taking this medication, and follow the directions you see here.         CONTINUE taking these medications    ferrous sulfate 325 (65 Fe) MG EC tablet  Commonly known as:  Fe Tabs  Take 1 tablet by mouth 2 times daily     sucralfate 1 GM tablet  Commonly known as:  Carafate  Take 1 tablet by mouth 4 times daily           Where to Get Your Medications      These medications were sent to [address] [place] [address]    Phone:  [phone]   · ferrous sulfate 325 (65 Fe) MG EC tablet  · folic acid 1 MG tablet  · nadolol 40 MG tablet  · sucralfate 1 GM tablet  · thiamine 100 MG tablet       Note that more than 30 minutes was spent in preparing discharge papers, discussing discharge with patient, medication review, etc.    Electronically signed by Michelle Cabrera DO on 10/17/2020 at 2:35 PM

## 2020-10-17 NOTE — PROGRESS NOTES
Call placed to Dr. Louisa Hurt answering service to check discharge. Awaiting call back/ new orders.

## 2020-12-04 NOTE — PROGRESS NOTES
Hepatobiliary and Pancreatic Surgery Attending History and Physical    Patient's Name/Date of Birth: Andre Kelly /1955 (65 y.o.)    Date: December 4, 2020     CC: hepatocellular carcinoma    HPI:  Patient is a very pleasant 72year old male whom has had hepatitis C cirrhosis. He is a patient of Dr. Chris Mishra whom has been undergoing esophageal banding for the past 5 - 6 years. He has never had a paracentesis nor hepatic encephalopathy. He does drink 4 beers daily. He had imaging performed his last hospital admission which showed a 6cm segment 6 hepatocellular carcinoma - biopsy proven. His platelet count is 22Z and he does have low volume ascites. His MELD Na = 8 and he's a Child Haile A (6). He denies any weight loss or abdominal pain. His AFP is 65k.       Past Medical History:   Diagnosis Date    Cirrhosis ([address]     Esophageal varices (HCC)     GI bleed     UPPER- 15 MONTHS AGO    Hepatitis C     Hypertension        Past Surgical History:   Procedure Laterality Date    CT NEEDLE BIOPSY LIVER PERCUTANEOUS  10/15/2020    CT NEEDLE BIOPSY LIVER PERCUTANEOUS 10/15/2020 [facility]    ENDOSCOPY, COLON, DIAGNOSTIC  4/30/2013    LEG SURGERY      right leg    UPPER GASTROINTESTINAL ENDOSCOPY  09/02/2016    Nevarez, nonbleeding esophageal varicies and gastritis    UPPER GASTROINTESTINAL ENDOSCOPY  03/27/2018    UPPER GASTROINTESTINAL ENDOSCOPY N/A 3/27/2018    EGD BAND LIGATION performed by Esperanza Garcia MD at [facility] N/A 10/14/2020    EGD CONTROL HEMORRHAGE performed by Nicolas Subramanian MD at [facility] ENDOSCOPY       Current Outpatient Medications   Medication Sig Dispense Refill    ferrous sulfate (FE TABS) 325 (65 Fe) MG EC tablet Take 1 tablet by mouth 2 times daily 60 tablet 0    sucralfate (CARAFATE) 1 GM tablet Take 1 tablet by mouth 4 times daily 120 tablet 0    nadolol (CORGARD) 40 MG tablet Take 1 tablet by mouth daily 30 tablet 0    folic acid photophobia, pain and discharge. Respiratory: Negative for shortness of breath. Cardiovascular: Negative for palpitations and leg swelling. Gastrointestinal: Negative for abdominal pain, blood in stool, constipation, diarrhea, nausea and vomiting. Endocrine: Negative for polydipsia. Genitourinary: Negative for frequency, hematuria and urgency. Musculoskeletal: Negative for back pain and neck pain. Skin: Negative for rash. Allergic/Immunologic: Negative for environmental allergies. Neurological: Negative for tremors and seizures. Psychiatric/Behavioral: Negative for hallucinations and suicidal ideas. The patient is not nervous/anxious. Physical Exam:  Vitals:    12/04/20 1056   BP: 128/70   Pulse: 79   Resp: 18   Temp: 99.3 °F (37.4 °C)   SpO2: 97%       PSYCH: mood and affect normal, alert and oriented x 3  CONSTITUTIONAL: No apparent distress, comfortable  EYES: Sclera white, pupils equal round and reactive to light  ENMT:  Hearing normal, trachea midline, ears externally intact  LYMPH: no lympadenopathy in neck. Nolympadenopathy in groins  RESP: Breath sounds were clear and equal with no rales, wheezes, or rhonchi. Respiratory effort was normal with no retractions or use of accessory muscles. CV: Heart soundswere normal with a regular rate and rhythm.    No pedal edema  GI/ Abdomen: Soft, nondistended, nontender, no guarding, no peritoneal signs    MSK: no clubbing/ no cyanosis/ gaitnormal       Assessment/Plan:  Hepatocellular carcinoma (6cm) segment 6 without portal vein invasion, Child's A (6)/B(7), MELD NA = 8, portal hypertension with esophageal varices  - he is well compensated and his portal vein appears open  - will plan for a triphasic CT scan of his abdomen with Chest CT  - we also discussed that he is outside CONSTANTINE criteria for Transplant, and although he is compensated he does have significant portal hypertension and therefore that precludes resection  - his bilirubin is low and he appears to be a good candidate for y90 along with systemic therapy  - will refer him to IR for potentional y90 vs. TACE  - agree with systemic chemotherapy  - will plan to place mediport  - Patient was made aware of the risks, benefits, alternatives, and complications of a mediport insertion and wish to proceed with surgery. 39 Minutes of which greater than 50% was spent counseling or coordinating his care. Thank you for the consultation allowing me to take part in Mr. Lexy Garcia' care. Please send a copy of my note to Dr. Estefania Ramirez M.D.  12/4/2020  11:00 AM

## 2020-12-04 NOTE — TELEPHONE ENCOUNTER
Patient scheduled for Port insertion on 12/10/20 at 9:30am at [facility]. I called patient and made him aware of this date and time. I told him PAT will be calling with further instructions and I told him he needs covid tested today and I gave him the location for the testing and time they are there till. He verbalized understanding and confirmed this surgery date. Electronically signed by Adrienne Ruiz RN on 12/4/2020 at 1:00 PM      I called [employer] and no auth needed for cpt code 73448 with [facility]ZS#[id number] from [address] [place] [address].     Electronically signed by Adrienne Ruiz RN on 12/7/2020 at 8:37 AM

## 2020-12-08 NOTE — TELEPHONE ENCOUNTER
I called Zackery Garcia was obtained for cpt codes [phone] and [phone] with Dariela Gift #[id number] with dates approved 12/7/20-1/6/20. I called radiology and scheduled patient for his CT chest and CTA liver on 12/15/20 at 9:00am at [facility] with arrival time of 8:30am.  I will call patient and make him aware of this date and time and instruct him to remain NPO 4 hours prior to his scan and to enter main entrance with mask and bring photo ID, and insurance card with him.       Electronically signed by Fabian Rubio RN on 12/8/2020 at 8:31 AM

## 2020-12-09 NOTE — H&P (VIEW-ONLY)
Hepatobiliary and Pancreatic Surgery Attending History and Physical     Patient's Name/Date of Birth: Corey Sam /1955 (29 y.o.)     Date: December 4, 2020      CC: hepatocellular carcinoma     HPI:  Patient is a very pleasant 72year old male whom has had hepatitis C cirrhosis. He is a patient of Dr. Tamara Woods whom has been undergoing esophageal banding for the past 5 - 6 years. He has never had a paracentesis nor hepatic encephalopathy. He does drink 4 beers daily. He had imaging performed his last hospital admission which showed a 6cm segment 6 hepatocellular carcinoma - biopsy proven. His platelet count is 20I and he does have low volume ascites. His MELD Na = 8 and he's a Child Haile A (6). He denies any weight loss or abdominal pain. His AFP is 65k.         Past Medical History        Past Medical History:   Diagnosis Date    [facility] [address]      Esophageal varices (HCC)      GI bleed       UPPER- 15 MONTHS AGO    Hepatitis C      Hypertension             Past Surgical History         Past Surgical History:   Procedure Laterality Date    CT NEEDLE BIOPSY LIVER PERCUTANEOUS   10/15/2020     CT NEEDLE BIOPSY LIVER PERCUTANEOUS 10/15/2020 [facility]    ENDOSCOPY, COLON, DIAGNOSTIC   4/30/2013    LEG SURGERY         right leg    UPPER GASTROINTESTINAL ENDOSCOPY   09/02/2016     Nevarez, nonbleeding esophageal varicies and gastritis    UPPER GASTROINTESTINAL ENDOSCOPY   03/27/2018    UPPER GASTROINTESTINAL ENDOSCOPY N/A 3/27/2018     [facility]GD [facility] N/A 10/14/2020     EGD CONTROL HEMORRHAGE performed by Lolita Markham MD at [facility] ENDOSCOPY           Current Facility-Administered Medications          Current Outpatient Medications   Medication Sig Dispense Refill    ferrous sulfate (FE TABS) 325 (65 Fe) MG EC tablet Take 1 tablet by mouth 2 times daily 60 tablet 0    sucralfate (CARAFATE) 1 GM tablet Take 1 tablet by mouth 4 times daily 120 tablet 0    nadolol (CORGARD) 40 MG tablet Take 1 tablet by mouth daily 30 tablet 0    folic acid (FOLVITE) 1 MG tablet Take 1 tablet by mouth daily 30 tablet 0    thiamine 100 MG tablet Take 1 tablet by mouth daily 30 tablet 3      No current facility-administered medications for this visit.            No Known Allergies     Family History   No family history on file.        Social History               Socioeconomic History    Marital status: Single       Spouse name: Not on file    Number of children: Not on file    Years of education: Not on file    Highest education level: Not on file   Occupational History    Not on file   Social Needs    Financial resource strain: Not on file    Food insecurity       Worry: Not on file       Inability: Not on file    Transportation needs       Medical: Not on file       Non-medical: Not on file   Tobacco Use    Smoking status: Never Smoker    Smokeless tobacco: Never Used   Substance and Sexual Activity    Alcohol use:  Yes       Frequency: 4 or more times a week       Drinks per session: 3 or 4       Binge frequency: Daily or almost daily       Comment: at least a 6 pack daily    Drug use: No    Sexual activity: Yes   Lifestyle    Physical activity       Days per week: Not on file       Minutes per session: Not on file    Stress: Not on file   Relationships    Social connections       Talks on phone: Not on file       Gets together: Not on file       Attends [employer] service: Not on file       Active member of club or organization: Not on file       Attends meetings of clubs or organizations: Not on file       Relationship status: Not on file    Intimate partner violence       Fear of current or ex partner: Not on file       Emotionally abused: Not on file       Physically abused: Not on file       Forced sexual activity: Not on file   Other Topics Concern    Not on file   Social History Narrative    Not on file    ROS:   Review of Systems   Constitutional: Negative for chills, diaphoresis and fever. HENT: Negative for congestion, ear discharge, ear pain, hearing loss, nosebleeds and tinnitus. Eyes: Negative for photophobia, pain and discharge. Respiratory: Negative for shortness of breath. Cardiovascular: Negative for palpitations and leg swelling. Gastrointestinal: Negative for abdominal pain, blood in stool, constipation, diarrhea, nausea and vomiting. Endocrine: Negative for polydipsia. Genitourinary: Negative for frequency, hematuria and urgency. Musculoskeletal: Negative for back pain and neck pain. Skin: Negative for rash. Allergic/Immunologic: Negative for environmental allergies. Neurological: Negative for tremors and seizures. Psychiatric/Behavioral: Negative for hallucinations and suicidal ideas. The patient is not nervous/anxious.          Physical Exam:      Vitals:     12/04/20 1056   BP: 128/70   Pulse: 79   Resp: 18   Temp: 99.3 °F (37.4 °C)   SpO2: 97%         PSYCH: mood and affect normal, alert and oriented x 3  CONSTITUTIONAL: No apparent distress, comfortable  EYES: Sclera white, pupils equal round and reactive to light  ENMT:  Hearing normal, trachea midline, ears externally intact  LYMPH: no lympadenopathy in neck. Nolympadenopathy in groins  RESP: Breath sounds were clear and equal with no rales, wheezes, or rhonchi. Respiratory effort was normal with no retractions or use of accessory muscles. CV: Heart soundswere normal with a regular rate and rhythm.    No pedal edema  GI/ Abdomen: Soft, nondistended, nontender, no guarding, no peritoneal signs     MSK: no clubbing/ no cyanosis/ gaitnormal     Assessment/Plan:  Hepatocellular carcinoma (6cm) segment 6 without portal vein invasion, Child's A (6)/B(7), MELD NA = 8, portal hypertension with esophageal varices  - he is well compensated and his portal vein appears open  - will plan for a triphasic CT scan of his abdomen with Chest CT  - we also discussed that he is outside CONSTANTINE criteria for Transplant, and although he is compensated he does have significant portal hypertension and therefore that precludes resection  - his bilirubin is low and he appears to be a good candidate for y90 along with systemic therapy  - will refer him to IR for potentional y90 vs. TACE  - agree with systemic chemotherapy  - will plan to place mediport  - Patient was made aware of the risks, benefits, alternatives, and complications of a mediport insertion and wish to proceed with surgery.       45 Minutes of which greater than 50% was spent counseling or coordinating his care.     Thank you for the consultation allowing me to take part in Mr. Theresa Marrero' care.      Please send a copy of my note to Dr. Maria E Flanagan.  Aysha Bustos M.D.  12/4/2020  11:00 AM

## 2020-12-09 NOTE — TELEPHONE ENCOUNTER
I called the patient's primary insurance [employer] and I spoke with Patricia Alanis authorization rep., she stated no authorization is required for CPT codes 36895, [id number],. Approval given to Erika. Patient will be called and scheduled by IR.            Electronically signed by Dash Eckert on 12/9/20 at 10:34 AM EST

## 2020-12-09 NOTE — H&P (VIEW-ONLY)
Hepatobiliary and Pancreatic Surgery Attending History and Physical     Patient's Name/Date of Birth: Julia Paz /1955 (33 y.o.)     Date: December 4, 2020      CC: hepatocellular carcinoma     HPI:  Patient is a very pleasant 72year old male whom has had hepatitis C cirrhosis. He is a patient of Dr. Mauricio Peter whom has been undergoing esophageal banding for the past 5 - 6 years. He has never had a paracentesis nor hepatic encephalopathy. He does drink 4 beers daily. He had imaging performed his last hospital admission which showed a 6cm segment 6 hepatocellular carcinoma - biopsy proven. His platelet count is 85X and he does have low volume ascites. His MELD Na = 8 and he's a Child Haile A (6). He denies any weight loss or abdominal pain. His AFP is 65k.         Past Medical History        Past Medical History:   Diagnosis Date    Cirrhosis ([address]      Esophageal varices (HCC)      GI bleed       UPPER- 15 MONTHS AGO    Hepatitis C      Hypertension             Past Surgical History         Past Surgical History:   Procedure Laterality Date    CT NEEDLE BIOPSY LIVER PERCUTANEOUS   10/15/2020     CT NEEDLE BIOPSY LIVER PERCUTANEOUS 10/15/2020 [facility]    ENDOSCOPY, COLON, DIAGNOSTIC   4/30/2013    LEG SURGERY         right leg    UPPER GASTROINTESTINAL ENDOSCOPY   09/02/2016     Nevarez, nonbleeding esophageal varicies and gastritis    UPPER GASTROINTESTINAL ENDOSCOPY   03/27/2018    UPPER GASTROINTESTINAL ENDOSCOPY N/A 3/27/2018     EGD BAND LIGATION performed by Leesa Lam MD at [address] [place] [address] N/A 10/14/2020     EGD CONTROL HEMORRHAGE performed by Ramandeep Lee MD at [facility] ENDOSCOPY           Current Facility-Administered Medications          Current Outpatient Medications   Medication Sig Dispense Refill    ferrous sulfate (FE TABS) 325 (65 Fe) MG EC tablet Take 1 tablet by mouth 2 times daily 60 tablet 0    sucralfate (CARAFATE) 1 GM tablet Take 1 tablet by mouth 4 times daily 120 tablet 0    nadolol (CORGARD) 40 MG tablet Take 1 tablet by mouth daily 30 tablet 0    folic acid (FOLVITE) 1 MG tablet Take 1 tablet by mouth daily 30 tablet 0    thiamine 100 MG tablet Take 1 tablet by mouth daily 30 tablet 3      No current facility-administered medications for this visit.            No Known Allergies     Family History   No family history on file.        Social History               Socioeconomic History    Marital status: Single       Spouse name: Not on file    Number of children: Not on file    Years of education: Not on file    Highest education level: Not on file   Occupational History    Not on file   Social Needs    Financial resource strain: Not on file    Food insecurity       Worry: Not on file       Inability: Not on file    Transportation needs       Medical: Not on file       Non-medical: Not on file   Tobacco Use    Smoking status: Never Smoker    Smokeless tobacco: Never Used   Substance and Sexual Activity    Alcohol use:  Yes       Frequency: 4 or more times a week       Drinks per session: 3 or 4       Binge frequency: Daily or almost daily       Comment: at least a 6 pack daily    Drug use: No    Sexual activity: Yes   Lifestyle    Physical activity       Days per week: Not on file       Minutes per session: Not on file    Stress: Not on file   Relationships    Social connections       Talks on phone: Not on file       Gets together: Not on file       Attends Islam service: Not on file       Active member of club or organization: Not on file       Attends meetings of clubs or organizations: Not on file       Relationship status: Not on file    Intimate partner violence       Fear of current or ex partner: Not on file       Emotionally abused: Not on file       Physically abused: Not on file       Forced sexual activity: Not on file   Other Topics Concern    Not on file   Social History Narrative    Not on file    ROS:   Review of Systems   Constitutional: Negative for chills, diaphoresis and fever. HENT: Negative for congestion, ear discharge, ear pain, hearing loss, nosebleeds and tinnitus. Eyes: Negative for photophobia, pain and discharge. Respiratory: Negative for shortness of breath. Cardiovascular: Negative for palpitations and leg swelling. Gastrointestinal: Negative for abdominal pain, blood in stool, constipation, diarrhea, nausea and vomiting. Endocrine: Negative for polydipsia. Genitourinary: Negative for frequency, hematuria and urgency. Musculoskeletal: Negative for back pain and neck pain. Skin: Negative for rash. Allergic/Immunologic: Negative for environmental allergies. Neurological: Negative for tremors and seizures. Psychiatric/Behavioral: Negative for hallucinations and suicidal ideas. The patient is not nervous/anxious.          Physical Exam:      Vitals:     12/04/20 1056   BP: 128/70   Pulse: 79   Resp: 18   Temp: 99.3 °F (37.4 °C)   SpO2: 97%         PSYCH: mood and affect normal, alert and oriented x 3  CONSTITUTIONAL: No apparent distress, comfortable  EYES: Sclera white, pupils equal round and reactive to light  ENMT:  Hearing normal, trachea midline, ears externally intact  LYMPH: no lympadenopathy in neck. Nolympadenopathy in groins  RESP: Breath sounds were clear and equal with no rales, wheezes, or rhonchi. Respiratory effort was normal with no retractions or use of accessory muscles. CV: Heart soundswere normal with a regular rate and rhythm.    No pedal edema  GI/ Abdomen: Soft, nondistended, nontender, no guarding, no peritoneal signs     MSK: no clubbing/ no cyanosis/ gaitnormal     Assessment/Plan:  Hepatocellular carcinoma (6cm) segment 6 without portal vein invasion, Child's A (6)/B(7), MELD NA = 8, portal hypertension with esophageal varices  - he is well compensated and his portal vein appears open  - will plan for a triphasic CT scan of his abdomen with Chest CT  - we also discussed that he is outside CONSTANTINE criteria for Transplant, and although he is compensated he does have significant portal hypertension and therefore that precludes resection  - his bilirubin is low and he appears to be a good candidate for y90 along with systemic therapy  - will refer him to IR for potentional y90 vs. TACE  - agree with systemic chemotherapy  - will plan to place mediport  - Patient was made aware of the risks, benefits, alternatives, and complications of a mediport insertion and wish to proceed with surgery.       45 Minutes of which greater than 50% was spent counseling or coordinating his care.     Thank you for the consultation allowing me to take part in Mr. Soniya Villarreal' care.      Please send a copy of my note to Dr. Brandon Hernandez.  Robert Cesar M.D.  12/4/2020  11:00 AM

## 2020-12-11 NOTE — PROGRESS NOTES
Patient here for Y90 Consultation. Dr Serenity Rosado explained the procedure, risks and benefits to the patient. Patient chose to move forward. All lab work was drawn. Patient scheduled for Mapping on Monday 12/14/2020  With the procedure to take place on Wednesday 12/16/20  Patient given instruction to be NPO after midnight, only to take tylenol for pain. No NSAIDS or thinners.

## 2020-12-12 NOTE — PROGRESS NOTES
Hepatobiliary and Pancreatic Surgery Attending History and Physical    Patient's Name/Date of Birth: Prem Gutierrez /1955 (49 y.o.)    Date: December 12, 2020     CC: hepatocellular carcinoma    HPI:  Patient is a very pleasant 72year old male whom has had hepatitis C cirrhosis. He is a patient of Dr. Sim Mcguire whom has been undergoing esophageal banding for the past 5 - 6 years. He has never had a paracentesis nor hepatic encephalopathy. He does drink 4 beers daily. He had imaging performed his last hospital admission which showed a 6cm segment 6 hepatocellular carcinoma - biopsy proven. His platelet count is 82U and he does have low volume ascites. His MELD Na = 8 and he's a Child Haile A (6). He denies any weight loss or abdominal pain. His AFP is 65k. He had a triphasic CT scan of his liver which showed that the tumor was actually 9cm in diameter with new onset ascites. He also had a CT chest which showed two new pulmonary nodules. He was scheduled to havea port placement last week with me, but did not show up for it. Physical Exam:  Vitals:    12/11/20 1105   BP: (!) 150/80   Pulse: 113   Resp: 18   Temp: 98.4 °F (36.9 °C)   SpO2: 98%       PSYCH: mood and affect normal, alert and oriented x 3  CONSTITUTIONAL: No apparent distress, comfortable  EYES: Sclera white, pupils equal round and reactive to light  ENMT:  Hearing normal, trachea midline, ears externally intact  LYMPH: no lympadenopathy in neck. Nolympadenopathy in groins  RESP: Breath sounds were clear and equal with no rales, wheezes, or rhonchi. Respiratory effort was normal with no retractions or use of accessory muscles. CV: Heart soundswere normal with a regular rate and rhythm.    No pedal edema  GI/ Abdomen: Soft, nondistended, nontender, no guarding, no peritoneal signs    MSK: no clubbing/ no cyanosis/ gaitnormal       Assessment/Plan: Hepatocellular carcinoma (9cm) segment 6 without portal vein invasion, Child's A (6)/B(7), MELD NA = 8, portal hypertension with esophageal varices  - we also discussed that he is outside CONSTANTINE criteria for Transplant, and although he is compensated he does have significant portal hypertension and therefore that precludes resection  - his bilirubin is low   - he saw IR whom is planning on mapping and y90 delivery next week. - agree with systemic chemotherapy with Dr. Isaac Hope  - still needs a mediport    10 Minutes of which greater than 50% was spent counseling or coordinating his care. Thank you for the consultation allowing me to take part in Mr. Booker Isabel' care. Please send a copy of my note to Dr. Harshad Golden.  Theo Cortes M.D.  12/12/2020  8:24 AM

## 2020-12-14 NOTE — ANESTHESIA POSTPROCEDURE EVALUATION
Department of Anesthesiology  Postprocedure Note    Patient: Ariel Salcido  MRN: [id number]  YOB: 1955  Date of evaluation: 12/14/2020  Time:  1:52 PM     Procedure Summary     Date: 12/14/20 Room / Location: [address] [place] [address] [address] Procedures; [address] Radiology    Anesthesia Start: 1119 Anesthesia Stop: 4229    Procedure: IR FLUOROSCOPY [address] VENOUS ACCESS DEVICE PLACEMENT Diagnosis:       Hepatocellular carcinoma ([address]      (Port placement for chemo therapy)    Scheduled Providers: [place] Radiologist Responsible Provider: Alex Bauer MD    Anesthesia Type: MAC ASA Status: 3          Anesthesia Type: MAC    Dona Phase I:      Dona Phase II:      Last vitals: Reviewed and per EMR flowsheets.        Anesthesia Post Evaluation    Patient location during evaluation: PACU  Patient participation: complete - patient participated  Level of consciousness: awake  Pain score: 3  Airway patency: patent  Nausea & Vomiting: no nausea and no vomiting  Complications: no  Cardiovascular status: blood pressure returned to baseline  Respiratory status: acceptable  Hydration status: euvolemic

## 2020-12-14 NOTE — BRIEF OP NOTE
Brief Postoperative Note    Rosario Dumas  YOB: 1955  [id number]    Pre-operative Diagnosis and Procedure: mediport and terry    Post-operative Diagnosis: Same    Anesthesia: Local    Estimated Blood Loss: < 10 cc    Surgeon: Livan FLOREZ     Complications: none    Specimen obtained: none    Findings: none   Joel Platt II   12/14/2020 11:51 AM

## 2020-12-14 NOTE — PROGRESS NOTES
Patient arrived to Radiology department for Y90 mapping. Allergies, home medications, H&P and fasting instructions reviewed with patient. Vital signs taken. IV placed, blood obtained, IV flushed and prn adapter attached. Procedural instructions given, questions answered, understanding expressed and consent signed. Patient given fluoroscopy education, no questions at this time    ID Band Check: Yes    ALLERGIES: Patient has no known allergies.     LABS:   Lab Results   Component Value Date    INR 1.3 12/11/2020    PROTIME 14.7 (H) 12/11/2020           Lab Results   Component Value Date    CREATININE 0.8 12/11/2020    BUN 6 (L) 12/11/2020          Lab Results   Component Value Date    HGB 8.4 (L) 12/11/2020    HCT 28.6 (L) 12/11/2020    PLT 78 (L) 12/11/2020

## 2020-12-14 NOTE — ANESTHESIA PRE PROCEDURE
Department of Anesthesiology  Preprocedure Note       Name:  David Ochoa   Age:  72 y.o.  :  1955                                          MRN:  [id number]         Date:  2020      Surgeon: * No surgeons listed *    Procedure: * No procedures listed *    Medications prior to admission:   Prior to Admission medications    Medication Sig Start Date End Date Taking? Authorizing Provider   dicyclomine (BENTYL) 10 MG capsule Take 10 mg by mouth 2 times daily    Historical Provider, MD   pantoprazole (PROTONIX) 40 MG tablet Take 40 mg by mouth daily    Historical Provider, MD   omeprazole (PRILOSEC) 20 MG delayed release capsule Take 40 mg by mouth daily    Historical Provider, MD   ferrous sulfate (FE TABS) 325 (65 Fe) MG EC tablet Take 1 tablet by mouth 2 times daily 10/17/20 12/11/20  Yandel Almodovar DO   sucralfate (CARAFATE) 1 GM tablet Take 1 tablet by mouth 4 times daily 10/17/20   Gerry Roberts DO       Current medications:    Current Outpatient Medications   Medication Sig Dispense Refill    dicyclomine (BENTYL) 10 MG capsule Take 10 mg by mouth 2 times daily      pantoprazole (PROTONIX) 40 MG tablet Take 40 mg by mouth daily      omeprazole (PRILOSEC) 20 MG delayed release capsule Take 40 mg by mouth daily      ferrous sulfate (FE TABS) 325 (65 Fe) MG EC tablet Take 1 tablet by mouth 2 times daily 60 tablet 0    sucralfate (CARAFATE) 1 GM tablet Take 1 tablet by mouth 4 times daily 120 tablet 0     No current facility-administered medications for this encounter.         Allergies:  No Known Allergies    Problem List:    Patient Active Problem List   Diagnosis Code    Hematemesis with nausea K92.0    Acute blood loss anemia D62    Alcohol abuse F10.10    Gastrointestinal hemorrhage with hematemesis K92.0    Chronic superficial gastritis without bleeding K29.30    Secondary esophageal varices with bleeding (HCC) I85.11    GI bleed K92.2    Esophageal varices (HCC) I85.00    H/O esophageal varices Z87.19       Past Medical History:        Diagnosis Date    [facility] [address]     Esophageal varices (HCC)     GI bleed     UPPER- 15 MONTHS AGO    Hepatitis C     Hepatocellular carcinoma (HCC)     per Dr. Pavel Briones note    Hypertension        Past Surgical History:        Procedure Laterality Date    CT NEEDLE BIOPSY LIVER PERCUTANEOUS  10/15/2020    CT NEEDLE BIOPSY LIVER PERCUTANEOUS 10/15/2020 [facility]    ENDOSCOPY, COLON, DIAGNOSTIC  4/30/2013    LEG SURGERY      right leg    UPPER GASTROINTESTINAL ENDOSCOPY  09/02/2016    Nevarez, nonbleeding esophageal varicies and gastritis    UPPER GASTROINTESTINAL ENDOSCOPY  03/27/2018    UPPER GASTROINTESTINAL ENDOSCOPY N/A 3/27/2018    EGD BAND LIGATION performed by Maggie Joya MD at [address] [facility] N/A 10/14/2020    EGD CONTROL HEMORRHAGE performed by Todd Gomez MD at [address] History:    Social History     Tobacco Use    Smoking status: Never Smoker    Smokeless tobacco: Never Used   Substance Use Topics    Alcohol use: Yes     Frequency: 4 or more times a week     Drinks per session: 3 or 4     Binge frequency: Daily or almost daily     Comment: at least a 6 pack daily                                Counseling given: Not Answered      Vital Signs (Current): There were no vitals filed for this visit.                                            BP Readings from Last 3 Encounters:   12/14/20 (!) 142/66   12/11/20 (!) 150/80   12/04/20 128/70       NPO Status:  12/13/2020 Nothing to eat since 1700, nothing to drink since 2100                                                                              BMI:   Wt Readings from Last 3 Encounters:   12/14/20 187 lb (84.8 kg)   12/11/20 187 lb 6.4 oz (85 kg)   12/04/20 190 lb (86.2 kg)     There is no height or weight on file to calculate BMI.    CBC:   Lab Results   Component Value Date    WBC 1.6 12/11/2020    RBC 3.53 12/11/2020    RBC  09/01/2016      RBC           [id number]    transfused   09/02/16  00:19  SCC    RBC  09/01/2016      RBC           [id number]    transfused   09/02/16  19:47  SCC    HGB 8.4 12/11/2020    HCT 28.6 12/11/2020    MCV 81.0 12/11/2020    RDW 19.2 12/11/2020    PLT 78 12/11/2020       CMP:   Lab Results   Component Value Date     12/11/2020    K 4.2 12/11/2020    K 4.3 10/14/2020     12/11/2020    CO2 23 12/11/2020    BUN 6 12/11/2020    CREATININE 0.8 12/11/2020    GFRAA >60 12/11/2020    LABGLOM >60 12/11/2020    GLUCOSE 86 12/11/2020    PROT 7.9 12/11/2020    CALCIUM 8.8 12/11/2020    BILITOT 1.3 12/11/2020    ALKPHOS 90 12/11/2020    AST 58 12/11/2020    ALT 15 12/11/2020       POC Tests: No results for input(s): POCGLU, POCNA, POCK, POCCL, POCBUN, POCHEMO, POCHCT in the last 72 hours. Coags:   Lab Results   Component Value Date    PROTIME 14.7 12/11/2020    INR 1.3 12/11/2020    APTT 32.9 10/13/2020       HCG (If Applicable): No results found for: PREGTESTUR, PREGSERUM, HCG, HCGQUANT     ABGs: No results found for: PHART, PO2ART, FBQ2LJT, JTZ6LPR, BEART, T5AMDAUR     Type & Screen (If Applicable):  No results found for: LABABO, LABRH    Drug/Infectious Status (If Applicable):  No results found for: HIV, HEPCAB    COVID-19 Screening (If Applicable):   Lab Results   Component Value Date    COVID19 Not Detected 12/04/2020     EKG 10/13/2020  Sinus tachycardia, R 103  Otherwise normal ECG  When compared with ECG of 26-MAR-2018 15:10,  T wave inversion now evident in Inferior leads  Confirmed by Nacho Coil ([id number]) on 10/14/2020 6:33:59 AM    ECHO 3/28/2020  Findings      Left Ventricle   Normal left ventricle size. Mild concentric left ventricular hypertrophy. No wall motion abnormalities. Ejection fraction is visually estimated at 60-70%. Right Ventricle   Normal right ventricular size and function. Left Atrium   Normal left atrium.    L atrial pressure not elevated      Right Atrium   Normal right atrium size. Mitral Valve   Structurally normal mitral valve. Tricuspid Valve   Normal tricuspid valve structure and function. Aortic Valve   Structurally normal aortic valve. Pulmonic Valve   The pulmonic valve was not well visualized. Pericardial Effusion   No pericardial effusion. Conclusions      Summary   No wall motion abnormalities. Ejection fraction is visually estimated at 60-70%. Normal left atrium. L atrial pressure not elevated   Structurally normal mitral valve. Structurally normal aortic valve. No pericardial effusion. Signature      ----------------------------------------------------------------   Electronically signed by Kalpesh Bear MD(Interpreting   physician) on 03/28/2018 07:28 PM    CT CHEST W CONTRAST 12/11/2020  FINDINGS:   Demonstrate 2 pulmonary nodules at the left lung base one of which   measures 10 x 9 mm second which measures 7 x 6 mm. These are in the   left lower lobe. This is superimposed on extensive pleural thickening   and mild central emphysema. No definite pulmonary mass is identified   No significant pleural fluid is identified. The heart is unremarkable   The aorta appears to be unremarkable   The mediastinum is unremarkable   The abdomen findings were reported on the CT scan of the abdomen   report. Please refer to the CT scan of the abdomen report for   findings. Anesthesia Evaluation  Patient summary reviewed and Nursing notes reviewed  Airway: Mallampati: II  TM distance: >3 FB   Neck ROM: full  Mouth opening: > = 3 FB Dental:      Comment: Extremely poor dentition, several missing teeth and chipped teeth. PT denies any loose or cracked teeth.     Pulmonary:Negative Pulmonary ROS breath sounds clear to auscultation                             Cardiovascular:  Exercise tolerance: good (>4 METS),   (+) hypertension: mild,       ECG reviewed  Rhythm: regular  Rate: normal           Beta Blocker:  Not on Beta Blocker         Neuro/Psych:   (+) psychiatric history:            GI/Hepatic/Renal:   (+) hepatitis: C, liver disease: esophageal varices,          ROS comment: Hx: cirrhosis, Upper GI bleed 15 months ago, chronic superficial gastritis, alcohol abuse. Endo/Other:    (+) blood dyscrasia: anemia:., malignancy/cancer. Electrolyte problem: Hepatocellular carcinoma                 ROS comment: Hx: acute blood loss anemia Abdominal:           Vascular: negative vascular ROS. Anesthesia Plan      MAC     ASA 3     (20g LFA PIV)  Induction: intravenous. Anesthetic plan and risks discussed with patient. Use of blood products discussed with patient whom consented to blood products. Plan discussed with CRNA and attending. Deborah Herrera RN   12/14/2020        Pt seen, examined, chart reviewed, plan discussed.   Gilford Spitz Matteucci  12/14/2020  12:57 PM

## 2020-12-16 PROBLEM — C22.0 HEPATOCELLULAR CARCINOMA (HCC): Status: ACTIVE | Noted: 2020-01-01

## 2020-12-16 PROBLEM — Z48.89 POSTOPERATIVE OBSERVATION: Status: ACTIVE | Noted: 2020-01-01

## 2020-12-16 NOTE — PROGRESS NOTES
Patient returned from procedure. Dressing checked, clean, dry, and intact. Patient stable. No s/s of complications noted or reported. Vitals will be checked q 15min, see flow sheets.

## 2020-12-16 NOTE — H&P
Hepatobiliary and Pancreatic Surgery Attending History and Physical    Patient's Name/Date of Birth: Sydni Hermosillo /1955 (70 y.o.)    Date: December 16, 2020     CC: Hepatocellular Carcinoma    HPI:    Patient is a 72year old male with a  singificant history of hep C cirrhosis and hepatocellular carcinoma. He has had bleeding variciees in the past. On his most recent hospital admission he had a CT scan for bleeding varices and a lesion was seen on his liver. He had a liver biopsy that revealed hepatocellular carcinoma. Patient follows with oncology at [facility]. He underwent an uncomplicated R59 with IR today and has been admitted for overnight observation. He has no abdominal pain, nausea or vomiting.      Past Medical History:   Diagnosis Date    [facility]     Esophageal varices (HCC)     GI bleed     UPPER- 15 MONTHS AGO    Hepatitis C     Hepatocellular carcinoma ([facility] [address]     per Dr. Mery Evans note    Hypertension        Past Surgical History:   Procedure Laterality Date    CT NEEDLE BIOPSY LIVER PERCUTANEOUS  10/15/2020    CT NEEDLE BIOPSY LIVER PERCUTANEOUS 10/15/2020 [facility] CT    ENDOSCOPY, COLON, DIAGNOSTIC  4/30/2013    IR EMBOLIZATION TUMOR / ORGAN  12/14/2020    IR EMBOLIZATION TUMOR / ORGAN 12/14/2020 Patti Kaplan MD [facility]    IR EMBOLIZATION TUMOR / ORGAN  12/16/2020    IR EMBOLIZATION TUMOR / ORGAN 12/16/2020 Patti Kaplan MD [facility]    IR PORT PLACEMENT EQUAL OR GREATER THAN 5 YEARS  12/14/2020    IR PORT PLACEMENT EQUAL OR GREATER THAN 5 YEARS 12/14/2020 Patti Kaplan MD [facility]    LEG SURGERY      right leg    UPPER GASTROINTESTINAL ENDOSCOPY  09/02/2016    Toribio Cote, nonbleeding esophageal varicies and gastritis    UPPER GASTROINTESTINAL ENDOSCOPY  03/27/2018    UPPER GASTROINTESTINAL ENDOSCOPY N/A 3/27/2018    EGD BAND LIGATION performed by Emely Zee MD at [address] ENDOSCOPY N/A 10/14/2020    EGD CONTROL HEMORRHAGE performed by Nava Zuniga MD at [facility] ENDOSCOPY       Current Facility-Administered Medications   Medication Dose Route Frequency Provider Last Rate Last Admin    [START ON 12/17/2020] dexamethasone (DECADRON) injection 8 mg  8 mg Intravenous Q8H Shar Yoder II, MD        [START ON 12/17/2020] ondansetron [facility]) injection 8 mg  8 mg Intravenous Q8H Shar Yoder II, MD        prochlorperazine (COMPAZINE) suppository 25 mg  25 mg Rectal Q8H PRN Isabella Pete MD        acetaminophen (TYLENOL) tablet 650 mg  650 mg Oral Q4H PRN Shar Yoder II, MD        metronidazole (FLAGYL) 500 mg in NaCl 100 mL IVPB premix  500 mg Intravenous Q12H Shar Yoder II, MD        ceFAZolin (ANCEF) 500 mg in dextrose 5 % 50 mL IVPB  500 mg Intravenous Q8H Shar Yoder II, MD        0.9 % sodium chloride infusion   Intravenous Continuous Shar Yoder II,  mL/hr at 12/16/20 1638 New Bag at 12/16/20 1638    Followed by   Charley Matthews ON 12/17/2020] dextrose 5 % and 0.45 % sodium chloride infusion   Intravenous Continuous Isabella Pete MD        oxyCODONE-acetaminophen (PERCOCET) 5-325 MG per tablet 1 tablet  1 tablet Oral Q4H PRN Shar Yoder II, MD        Or    oxyCODONE-acetaminophen (PERCOCET) 5-325 MG per tablet 2 tablet  2 tablet Oral Q4H PRN Shar Yoder II, MD        Or    morphine (PF) injection 2 mg  2 mg Intravenous Q2H PRN Isabella Pete MD           No Known Allergies    No family history on file.     Social History     Socioeconomic History    Marital status: Single     Spouse name: Not on file    Number of children: Not on file    Years of education: Not on file    Highest education level: Not on file   Occupational History    Not on file   Social Needs    Financial resource strain: Not on file    Food insecurity     Worry: Not on file     Inability: Not on file    Transportation needs     Medical: Not on file     Non-medical: Not on file   Tobacco Use    Smoking status: Never Smoker    Smokeless tobacco: Never Used   Substance and Sexual Activity    Alcohol use: Yes     Frequency: 4 or more times a week     Drinks per session: 3 or 4     Binge frequency: Daily or almost daily     Comment: at least a 6 pack daily    Drug use: No    Sexual activity: Yes   Lifestyle    Physical activity     Days per week: Not on file     Minutes per session: Not on file    Stress: Not on file   Relationships    Social connections     Talks on phone: Not on file     Gets together: Not on file     Attends Sabianist service: Not on file     Active member of club or organization: Not on file     Attends meetings of clubs or organizations: Not on file     Relationship status: Not on file    Intimate partner violence     Fear of current or ex partner: Not on file     Emotionally abused: Not on file     Physically abused: Not on file     Forced sexual activity: Not on file   Other Topics Concern    Not on file   Social History Narrative    Not on file       ROS:   Review of Systems   Constitutional: Negative for chills, diaphoresis, fatigue and fever. HENT: Negative for congestion, ear pain and sore throat. Eyes: Negative for discharge and itching. Respiratory: Negative for cough, chest tightness and shortness of breath. Cardiovascular: Negative for chest pain and palpitations. Gastrointestinal: Negative for abdominal pain, blood in stool, constipation, diarrhea and nausea. Endocrine: Negative for cold intolerance, heat intolerance and polydipsia. Genitourinary: Negative for difficulty urinating and dysuria. Musculoskeletal: Negative for joint swelling and myalgias. Skin: Negative for color change and rash. Allergic/Immunologic: Negative for environmental allergies and food allergies. Neurological: Negative for seizures, numbness and headaches. Hematological: Negative for adenopathy.    Psychiatric/Behavioral: Negative for agitation and confusion. The patient is not nervous/anxious. Physical Exam:  Vitals:    12/16/20 1600   BP: (!) 149/83   Pulse: 84   Resp: 16   Temp: 97.9 °F (36.6 °C)   SpO2: 99%       PSYCH: mood and affect normal, alert and oriented x 3  CONSTITUTIONAL: No apparent distress, comfortable  EYES: Sclera white, pupils equal round  ENMT:  Hearing normal, trachea midline, ears externally intact  LYMPH: no lympadenopathy in neck. Nolympadenopathy in groins   RESP: Respiratory effort was normal with no retractions or use of accessory muscles. CV: RR  GI/ Abdomen: The abdomen was soft and non distended. There was no tenderness, guarding, rebound, or rigidity. MSK: Right leg with multiple incisional scars. no clubbing/ no cyanosis/       Assessment/Plan:    Patient underwent Y90 for hepatocellular carcinoma and will be observed in the hospital overnight.  He has an appointment with  the [facility] around 10-11 AM tomorrow and will be OK for discharge in order to make his appointment as long as he continues to do well    -IVF  -OK for clears tonight, diet tomorrow  -Pain control PRN  -Nausea control PRN    Plan discussed with Dr. Dashawn Allison    Electronically signed by Olga Tolentino DO on 12/16/2020 at 5:39 PM

## 2020-12-16 NOTE — BRIEF OP NOTE
Brief Postoperative Note    Melissa Dumas  YOB: 1955  [id number]    Pre-operative Diagnosis and Procedure: y90    Post-operative Diagnosis: Same    Anesthesia: Local    Estimated Blood Loss: < 10 cc    Surgeon: Yesi FLOREZ     Complications: none    Specimen obtained: none     Findings: none     Joe Borrego II   12/16/2020 12:03 PM

## 2020-12-16 NOTE — POST SEDATION
POST SEDATION NOTE:  Time: 12:03 PM    Cardiopulmonary: Vitals Signs Stable: yes    Level of Consciousness: alert    Reversal Agent Used: No    Complications: none    Follow-up/Observations: none    Pain Score: 1    Pérez Hay MD

## 2020-12-16 NOTE — PRE SEDATION
Rogelio Miller II, MD  12/16/2020  12:02 PM        PRE-SEDATION PHYSICIAN ASSESSMENT:      1. HISTORY & PHYSICAL EXAMINATION:  Comments: none    Vitals:    12/16/20 0852   BP: 118/62   Pulse: 93   Resp: 16   Temp: 98.4 °F (36.9 °C)   SpO2: 99%       Allergies: Patient has no known allergies. 2. Heart and Lungs immediately prior to procedure demonstrate no contraindications to proceed      Chief Complaint: <principal problem not specified>    Drug: unknown  Tobacco: unknown    3. PAST ANESTHESIA EXPERIENCE:  unknown. 4. AIRWAY/TEETH/HEAD & NECK(Mallampati Classification):  II (soft palate, uvula, fauces visible)    5: NORMAL RANGE OF MOTION OF NECK: No    6. PATIENT WILL LIKELY TOLERATE PLAN OF MODERATE SEDATION    7. ASA 2.     Diamond Vasquez MD

## 2020-12-17 NOTE — PROGRESS NOTES
Went to the floor and assessed the patient. No complaints of pain, nausea, or discomfort. Patient is up and dressed, having eaten a full breakfast just prior to our arrival.  He is anxious to leave. Discussed plan for discharge and care afterwards; patient is to follow up with Dr. Frank Velasquez. Placed order for discharge per Dr. Negrita Mcneill instructions. Called patient's preferred pharmacy to place orders for ordered medication, spoke with Capri Dominguez. Called floor and spoke with Eliseo Reno, to make sure she was aware of the order for discharge. Placed lab test orders for chart. All is reviewed with the patient and questions answered. He is asked to call [phone] or Dr. Rogers Lunch office with any further questions.

## 2020-12-17 NOTE — PLAN OF CARE
Problem: Falls - Risk of:  Goal: Will remain free from falls  Description: Will remain free from falls  12/17/2020 0227 by Selvin Sierra RN  Outcome: Met This Shift     Problem: Falls - Risk of:  Goal: Absence of physical injury  Description: Absence of physical injury  12/17/2020 0227 by Selvin Sierra RN  Outcome: Met This Shift     Problem: Pain:  Goal: Pain level will decrease  Description: Pain level will decrease  12/17/2020 0227 by Selvin Sierra RN  Outcome: Met This Shift     Problem: Pain:  Goal: Control of acute pain  Description: Control of acute pain  12/17/2020 0227 by Selvin Sierra RN  Outcome: Met This Shift     Problem: Pain:  Goal: Control of chronic pain  Description: Control of chronic pain  12/17/2020 0227 by Selvin Sierra RN  Outcome: Met This Shift

## 2020-12-17 NOTE — PLAN OF CARE
Problem: Falls - Risk of:  Goal: Will remain free from falls  Description: Will remain free from falls  Outcome: Met This Shift     Problem: Falls - Risk of:  Goal: Absence of physical injury  Description: Absence of physical injury  Outcome: Met This Shift     Problem: Pain:  Description: Pain management should include both nonpharmacologic and pharmacologic interventions. Goal: Pain level will decrease  Description: Pain level will decrease  Outcome: Met This Shift     Problem: Pain:  Description: Pain management should include both nonpharmacologic and pharmacologic interventions. Goal: Control of acute pain  Description: Control of acute pain  Outcome: Met This Shift     Problem: Pain:  Description: Pain management should include both nonpharmacologic and pharmacologic interventions.   Goal: Control of chronic pain  Description: Control of chronic pain  Outcome: Met This Shift

## 2020-12-17 NOTE — PROGRESS NOTES
Pt urinating well and tolerating breakfast. Discharge instructions given to patient. No questions or concerns. Pt ambulated upon d/c with no issues.

## 2020-12-17 NOTE — PROGRESS NOTES
Tayler notified that patient has an appointment at 1020am in the morning and needs to be discharged in order to get to the [facility] [place] for appointment.

## 2021-01-01 ENCOUNTER — OFFICE VISIT (OUTPATIENT)
Dept: PALLATIVE CARE | Age: 66
End: 2021-01-01
Payer: MEDICARE

## 2021-01-01 ENCOUNTER — HOSPITAL ENCOUNTER (OUTPATIENT)
Age: 66
Discharge: HOME | End: 2021-02-04
Payer: MEDICARE

## 2021-01-01 ENCOUNTER — HOSPITAL ENCOUNTER (EMERGENCY)
Age: 66
Discharge: HOME | End: 2021-02-24
Attending: EMERGENCY MEDICINE
Payer: MEDICARE

## 2021-01-01 ENCOUNTER — HOSPITAL ENCOUNTER (OUTPATIENT)
Dept: CT IMAGING | Age: 66
Discharge: HOME | End: 2021-02-03
Payer: MEDICARE

## 2021-01-01 ENCOUNTER — HOSPITAL ENCOUNTER (OUTPATIENT)
Age: 66
Discharge: HOME | End: 2021-04-02
Payer: MEDICARE

## 2021-01-01 ENCOUNTER — OFFICE VISIT (OUTPATIENT)
Dept: HEMATOLOGY | Age: 66
End: 2021-01-01
Payer: MEDICARE

## 2021-01-01 ENCOUNTER — HOSPITAL ENCOUNTER (OUTPATIENT)
Age: 66
Setting detail: OUTPATIENT SURGERY
Discharge: HOME | End: 2021-01-18
Attending: INTERNAL MEDICINE | Admitting: INTERNAL MEDICINE
Payer: MEDICARE

## 2021-01-01 ENCOUNTER — HOSPITAL ENCOUNTER (OUTPATIENT)
Dept: INTERVENTIONAL RADIOLOGY/VASCULAR | Age: 66
Discharge: HOME | End: 2021-04-11
Payer: MEDICARE

## 2021-01-01 ENCOUNTER — HOSPITAL ENCOUNTER (EMERGENCY)
Age: 66
Discharge: HOME | End: 2021-03-10
Attending: EMERGENCY MEDICINE
Payer: MEDICARE

## 2021-01-01 ENCOUNTER — ANESTHESIA (OUTPATIENT)
Dept: ENDOSCOPY | Age: 66
End: 2021-01-01
Payer: MEDICARE

## 2021-01-01 ENCOUNTER — TELEPHONE (OUTPATIENT)
Dept: HEMATOLOGY | Age: 66
End: 2021-01-01

## 2021-01-01 ENCOUNTER — HOSPITAL ENCOUNTER (INPATIENT)
Age: 66
LOS: 7 days | Discharge: HOSPICE | DRG: 682 | End: 2021-05-04
Attending: EMERGENCY MEDICINE | Admitting: FAMILY MEDICINE
Payer: MEDICARE

## 2021-01-01 ENCOUNTER — PREP FOR PROCEDURE (OUTPATIENT)
Dept: GASTROENTEROLOGY | Age: 66
End: 2021-01-01

## 2021-01-01 ENCOUNTER — APPOINTMENT (OUTPATIENT)
Dept: CT IMAGING | Age: 66
DRG: 436 | End: 2021-01-01
Payer: MEDICARE

## 2021-01-01 ENCOUNTER — HOSPITAL ENCOUNTER (OUTPATIENT)
Dept: INTERVENTIONAL RADIOLOGY/VASCULAR | Age: 66
Discharge: HOME | End: 2021-03-26
Payer: MEDICARE

## 2021-01-01 ENCOUNTER — HOSPITAL ENCOUNTER (OUTPATIENT)
Age: 66
Discharge: HOME | End: 2021-01-15
Payer: MEDICARE

## 2021-01-01 ENCOUNTER — HOSPITAL ENCOUNTER (OUTPATIENT)
Age: 66
Discharge: HOME | End: 2021-02-06
Payer: MEDICARE

## 2021-01-01 ENCOUNTER — ANESTHESIA EVENT (OUTPATIENT)
Dept: ENDOSCOPY | Age: 66
End: 2021-01-01
Payer: MEDICARE

## 2021-01-01 ENCOUNTER — HOSPITAL ENCOUNTER (OUTPATIENT)
Dept: INTERVENTIONAL RADIOLOGY/VASCULAR | Age: 66
Discharge: HOME | End: 2021-02-10
Payer: MEDICARE

## 2021-01-01 ENCOUNTER — APPOINTMENT (OUTPATIENT)
Dept: GENERAL RADIOLOGY | Age: 66
End: 2021-01-01
Payer: MEDICARE

## 2021-01-01 ENCOUNTER — APPOINTMENT (OUTPATIENT)
Dept: ULTRASOUND IMAGING | Age: 66
End: 2021-01-01
Payer: MEDICARE

## 2021-01-01 ENCOUNTER — HOSPITAL ENCOUNTER (EMERGENCY)
Age: 66
Discharge: HOME | End: 2021-01-13
Attending: EMERGENCY MEDICINE
Payer: MEDICARE

## 2021-01-01 ENCOUNTER — HOSPITAL ENCOUNTER (INPATIENT)
Age: 66
LOS: 5 days | Discharge: HOME | DRG: 436 | End: 2021-04-20
Attending: EMERGENCY MEDICINE | Admitting: FAMILY MEDICINE
Payer: MEDICARE

## 2021-01-01 ENCOUNTER — HOSPITAL ENCOUNTER (OUTPATIENT)
Age: 66
Discharge: HOME | End: 2021-03-21
Payer: MEDICARE

## 2021-01-01 ENCOUNTER — TELEPHONE (OUTPATIENT)
Dept: CASE MANAGEMENT | Age: 66
End: 2021-01-01

## 2021-01-01 ENCOUNTER — HOSPITAL ENCOUNTER (OUTPATIENT)
Dept: ULTRASOUND IMAGING | Age: 66
Discharge: HOME | End: 2021-01-15
Payer: MEDICARE

## 2021-01-01 VITALS
OXYGEN SATURATION: 100 % | HEART RATE: 93 BPM | DIASTOLIC BLOOD PRESSURE: 67 MMHG | WEIGHT: 185 LBS | BODY MASS INDEX: 25.06 KG/M2 | SYSTOLIC BLOOD PRESSURE: 128 MMHG | TEMPERATURE: 97.7 F | RESPIRATION RATE: 18 BRPM | HEIGHT: 72 IN

## 2021-01-01 VITALS
HEART RATE: 97 BPM | OXYGEN SATURATION: 99 % | DIASTOLIC BLOOD PRESSURE: 74 MMHG | RESPIRATION RATE: 18 BRPM | SYSTOLIC BLOOD PRESSURE: 139 MMHG | TEMPERATURE: 97.8 F

## 2021-01-01 VITALS
OXYGEN SATURATION: 98 % | DIASTOLIC BLOOD PRESSURE: 81 MMHG | SYSTOLIC BLOOD PRESSURE: 118 MMHG | HEART RATE: 110 BPM | WEIGHT: 164 LBS | BODY MASS INDEX: 22.24 KG/M2

## 2021-01-01 VITALS
HEIGHT: 72 IN | DIASTOLIC BLOOD PRESSURE: 70 MMHG | TEMPERATURE: 97.2 F | RESPIRATION RATE: 18 BRPM | SYSTOLIC BLOOD PRESSURE: 129 MMHG | HEART RATE: 83 BPM | OXYGEN SATURATION: 99 % | WEIGHT: 185 LBS | BODY MASS INDEX: 25.06 KG/M2

## 2021-01-01 VITALS
TEMPERATURE: 98.1 F | OXYGEN SATURATION: 98 % | RESPIRATION RATE: 18 BRPM | SYSTOLIC BLOOD PRESSURE: 118 MMHG | DIASTOLIC BLOOD PRESSURE: 68 MMHG | HEART RATE: 82 BPM

## 2021-01-01 VITALS
HEART RATE: 96 BPM | DIASTOLIC BLOOD PRESSURE: 52 MMHG | OXYGEN SATURATION: 97 % | WEIGHT: 143.9 LBS | RESPIRATION RATE: 14 BRPM | SYSTOLIC BLOOD PRESSURE: 102 MMHG | BODY MASS INDEX: 19.49 KG/M2 | TEMPERATURE: 97.9 F | HEIGHT: 72 IN

## 2021-01-01 VITALS
SYSTOLIC BLOOD PRESSURE: 141 MMHG | BODY MASS INDEX: 23.7 KG/M2 | HEART RATE: 120 BPM | OXYGEN SATURATION: 97 % | DIASTOLIC BLOOD PRESSURE: 71 MMHG | WEIGHT: 175 LBS | TEMPERATURE: 97.8 F | HEIGHT: 72 IN | RESPIRATION RATE: 16 BRPM

## 2021-01-01 VITALS
DIASTOLIC BLOOD PRESSURE: 71 MMHG | TEMPERATURE: 99.5 F | HEIGHT: 72 IN | SYSTOLIC BLOOD PRESSURE: 146 MMHG | OXYGEN SATURATION: 96 % | RESPIRATION RATE: 18 BRPM | HEART RATE: 101 BPM | BODY MASS INDEX: 25.06 KG/M2 | WEIGHT: 185 LBS

## 2021-01-01 VITALS
SYSTOLIC BLOOD PRESSURE: 100 MMHG | DIASTOLIC BLOOD PRESSURE: 51 MMHG | OXYGEN SATURATION: 100 % | RESPIRATION RATE: 6 BRPM

## 2021-01-01 VITALS
DIASTOLIC BLOOD PRESSURE: 59 MMHG | TEMPERATURE: 97.4 F | WEIGHT: 134.25 LBS | BODY MASS INDEX: 18.18 KG/M2 | SYSTOLIC BLOOD PRESSURE: 89 MMHG | HEIGHT: 72 IN | OXYGEN SATURATION: 99 % | RESPIRATION RATE: 16 BRPM | HEART RATE: 112 BPM

## 2021-01-01 VITALS
RESPIRATION RATE: 16 BRPM | OXYGEN SATURATION: 99 % | HEIGHT: 72 IN | HEART RATE: 99 BPM | DIASTOLIC BLOOD PRESSURE: 76 MMHG | BODY MASS INDEX: 22.59 KG/M2 | WEIGHT: 166.8 LBS | SYSTOLIC BLOOD PRESSURE: 136 MMHG | TEMPERATURE: 96.9 F

## 2021-01-01 VITALS
SYSTOLIC BLOOD PRESSURE: 120 MMHG | HEART RATE: 92 BPM | WEIGHT: 185 LBS | HEIGHT: 72 IN | TEMPERATURE: 98.2 F | OXYGEN SATURATION: 100 % | RESPIRATION RATE: 18 BRPM | BODY MASS INDEX: 25.06 KG/M2 | DIASTOLIC BLOOD PRESSURE: 78 MMHG

## 2021-01-01 VITALS
HEART RATE: 107 BPM | TEMPERATURE: 98.1 F | SYSTOLIC BLOOD PRESSURE: 131 MMHG | DIASTOLIC BLOOD PRESSURE: 86 MMHG | OXYGEN SATURATION: 98 % | RESPIRATION RATE: 20 BRPM

## 2021-01-01 VITALS
OXYGEN SATURATION: 100 % | BODY MASS INDEX: 22.48 KG/M2 | WEIGHT: 166 LBS | SYSTOLIC BLOOD PRESSURE: 103 MMHG | HEIGHT: 72 IN | HEART RATE: 84 BPM | RESPIRATION RATE: 16 BRPM | DIASTOLIC BLOOD PRESSURE: 59 MMHG

## 2021-01-01 DIAGNOSIS — C22.0 HEPATOCELLULAR CARCINOMA (HCC): Primary | ICD-10-CM

## 2021-01-01 DIAGNOSIS — N17.9 AKI (ACUTE KIDNEY INJURY) (HCC): Primary | ICD-10-CM

## 2021-01-01 DIAGNOSIS — Z51.5 PALLIATIVE CARE BY SPECIALIST: ICD-10-CM

## 2021-01-01 DIAGNOSIS — G89.3 PAIN DUE TO NEOPLASM: Primary | ICD-10-CM

## 2021-01-01 DIAGNOSIS — E87.5 HYPERKALEMIA: ICD-10-CM

## 2021-01-01 DIAGNOSIS — E87.1 HYPONATREMIA: Primary | ICD-10-CM

## 2021-01-01 DIAGNOSIS — R18.8 OTHER ASCITES: ICD-10-CM

## 2021-01-01 DIAGNOSIS — R18.8 CIRRHOSIS OF LIVER WITH ASCITES, UNSPECIFIED HEPATIC CIRRHOSIS TYPE (HCC): ICD-10-CM

## 2021-01-01 DIAGNOSIS — C22.0 HEPATOCELLULAR CARCINOMA (HCC): ICD-10-CM

## 2021-01-01 DIAGNOSIS — E80.6 HYPERBILIRUBINEMIA: ICD-10-CM

## 2021-01-01 DIAGNOSIS — E87.8 HYPOCHLOREMIA: ICD-10-CM

## 2021-01-01 DIAGNOSIS — Z98.890 STATUS POST ABDOMINAL PARACENTESIS: ICD-10-CM

## 2021-01-01 DIAGNOSIS — R18.8 OTHER ASCITES: Primary | ICD-10-CM

## 2021-01-01 DIAGNOSIS — Z01.818 PRE-OP TESTING: ICD-10-CM

## 2021-01-01 DIAGNOSIS — R16.0 LIVER MASS: ICD-10-CM

## 2021-01-01 DIAGNOSIS — D69.6 THROMBOCYTOPENIA (HCC): ICD-10-CM

## 2021-01-01 DIAGNOSIS — R18.8 ASCITES OF LIVER: Primary | ICD-10-CM

## 2021-01-01 DIAGNOSIS — Z98.890 S/P ABDOMINAL PARACENTESIS: ICD-10-CM

## 2021-01-01 DIAGNOSIS — K74.60 CIRRHOSIS OF LIVER WITH ASCITES, UNSPECIFIED HEPATIC CIRRHOSIS TYPE (HCC): ICD-10-CM

## 2021-01-01 DIAGNOSIS — C78.00 MALIGNANT NEOPLASM METASTATIC TO LUNG, UNSPECIFIED LATERALITY (HCC): ICD-10-CM

## 2021-01-01 DIAGNOSIS — Z01.818 PRE-OP EXAM: Primary | ICD-10-CM

## 2021-01-01 DIAGNOSIS — Z01.818 PRE-OP TESTING: Primary | ICD-10-CM

## 2021-01-01 DIAGNOSIS — Z01.818 PREOP TESTING: Primary | ICD-10-CM

## 2021-01-01 DIAGNOSIS — R18.0 MALIGNANT ASCITES: ICD-10-CM

## 2021-01-01 DIAGNOSIS — Z01.818 PREOP TESTING: ICD-10-CM

## 2021-01-01 DIAGNOSIS — Z01.818 PRE-OP EXAM: ICD-10-CM

## 2021-01-01 DIAGNOSIS — E87.20 LACTIC ACIDOSIS: ICD-10-CM

## 2021-01-01 LAB
ABO/RH: NORMAL
AFP-TUMOR MARKER: ABNORMAL NG/ML (ref 0–9)
AFP-TUMOR MARKER: ABNORMAL NG/ML (ref 0–9)
ALBUMIN FLUID: 0.3 G/DL
ALBUMIN FLUID: 0.7 G/DL
ALBUMIN FLUID: <0.2 G/DL
ALBUMIN SERPL-MCNC: 1.9 G/DL (ref 3.5–5.2)
ALBUMIN SERPL-MCNC: 2 G/DL (ref 3.5–5.2)
ALBUMIN SERPL-MCNC: 2.1 G/DL (ref 3.5–5.2)
ALBUMIN SERPL-MCNC: 2.6 G/DL (ref 3.5–5.2)
ALBUMIN SERPL-MCNC: 2.7 G/DL (ref 3.5–5.2)
ALBUMIN SERPL-MCNC: 2.8 G/DL (ref 3.5–5.2)
ALBUMIN SERPL-MCNC: 3 G/DL (ref 3.5–5.2)
ALBUMIN SERPL-MCNC: 3.1 G/DL (ref 3.5–5.2)
ALBUMIN SERPL-MCNC: 3.1 G/DL (ref 3.5–5.2)
ALP BLD-CCNC: 101 U/L (ref 40–129)
ALP BLD-CCNC: 58 U/L (ref 40–129)
ALP BLD-CCNC: 65 U/L (ref 40–129)
ALP BLD-CCNC: 67 U/L (ref 40–129)
ALP BLD-CCNC: 68 U/L (ref 40–129)
ALP BLD-CCNC: 70 U/L (ref 40–129)
ALP BLD-CCNC: 71 U/L (ref 40–129)
ALP BLD-CCNC: 72 U/L (ref 40–129)
ALP BLD-CCNC: 74 U/L (ref 40–129)
ALP BLD-CCNC: 75 U/L (ref 40–129)
ALP BLD-CCNC: 78 U/L (ref 40–129)
ALP BLD-CCNC: 81 U/L (ref 40–129)
ALP BLD-CCNC: 82 U/L (ref 40–129)
ALP BLD-CCNC: 83 U/L (ref 40–129)
ALP BLD-CCNC: 87 U/L (ref 40–129)
ALP BLD-CCNC: 88 U/L (ref 40–129)
ALT SERPL-CCNC: 10 U/L (ref 0–40)
ALT SERPL-CCNC: 12 U/L (ref 0–40)
ALT SERPL-CCNC: 13 U/L (ref 0–40)
ALT SERPL-CCNC: 14 U/L (ref 0–40)
ALT SERPL-CCNC: 16 U/L (ref 0–40)
ALT SERPL-CCNC: 18 U/L (ref 0–40)
ALT SERPL-CCNC: 19 U/L (ref 0–40)
ALT SERPL-CCNC: 20 U/L (ref 0–40)
ALT SERPL-CCNC: 24 U/L (ref 0–40)
ALT SERPL-CCNC: 26 U/L (ref 0–40)
ALT SERPL-CCNC: 8 U/L (ref 0–40)
AMMONIA: 16.1 UMOL/L (ref 16–60)
AMMONIA: 17 UMOL/L (ref 16–60)
AMMONIA: 29.2 UMOL/L (ref 16–60)
AMMONIA: 36.2 UMOL/L (ref 16–60)
AMMONIA: 39.6 UMOL/L (ref 16–60)
AMMONIA: 48.7 UMOL/L (ref 16–60)
AMMONIA: 49 UMOL/L (ref 16–60)
AMMONIA: 71.6 UMOL/L (ref 16–60)
AMYLASE FLUID: 20 U/L
AMYLASE FLUID: 27 U/L
AMYLASE FLUID: 38 U/L
AMYLASE: 103 U/L (ref 20–100)
ANION GAP SERPL CALCULATED.3IONS-SCNC: 10 MMOL/L (ref 7–16)
ANION GAP SERPL CALCULATED.3IONS-SCNC: 11 MMOL/L (ref 7–16)
ANION GAP SERPL CALCULATED.3IONS-SCNC: 12 MMOL/L (ref 7–16)
ANION GAP SERPL CALCULATED.3IONS-SCNC: 3 MMOL/L (ref 7–16)
ANION GAP SERPL CALCULATED.3IONS-SCNC: 4 MMOL/L (ref 7–16)
ANION GAP SERPL CALCULATED.3IONS-SCNC: 4 MMOL/L (ref 7–16)
ANION GAP SERPL CALCULATED.3IONS-SCNC: 5 MMOL/L (ref 7–16)
ANION GAP SERPL CALCULATED.3IONS-SCNC: 6 MMOL/L (ref 7–16)
ANION GAP SERPL CALCULATED.3IONS-SCNC: 7 MMOL/L (ref 7–16)
ANION GAP SERPL CALCULATED.3IONS-SCNC: 7 MMOL/L (ref 7–16)
ANION GAP SERPL CALCULATED.3IONS-SCNC: 8 MMOL/L (ref 7–16)
ANION GAP SERPL CALCULATED.3IONS-SCNC: 9 MMOL/L (ref 7–16)
ANISOCYTOSIS: ABNORMAL
ANTIBODY SCREEN: NORMAL
APPEARANCE FLUID: NORMAL
APTT: 31.8 SEC (ref 24.5–35.1)
APTT: 33.5 SEC (ref 24.5–35.1)
APTT: 35.6 SEC (ref 24.5–35.1)
APTT: 40 SEC (ref 24.5–35.1)
AST SERPL-CCNC: 28 U/L (ref 0–39)
AST SERPL-CCNC: 28 U/L (ref 0–39)
AST SERPL-CCNC: 34 U/L (ref 0–39)
AST SERPL-CCNC: 41 U/L (ref 0–39)
AST SERPL-CCNC: 42 U/L (ref 0–39)
AST SERPL-CCNC: 42 U/L (ref 0–39)
AST SERPL-CCNC: 43 U/L (ref 0–39)
AST SERPL-CCNC: 44 U/L (ref 0–39)
AST SERPL-CCNC: 46 U/L (ref 0–39)
AST SERPL-CCNC: 52 U/L (ref 0–39)
AST SERPL-CCNC: 53 U/L (ref 0–39)
AST SERPL-CCNC: 56 U/L (ref 0–39)
AST SERPL-CCNC: 63 U/L (ref 0–39)
AST SERPL-CCNC: 67 U/L (ref 0–39)
AST SERPL-CCNC: 86 U/L (ref 0–39)
AST SERPL-CCNC: 96 U/L (ref 0–39)
BASOPHILS ABSOLUTE: 0 E9/L (ref 0–0.2)
BASOPHILS ABSOLUTE: 0.01 E9/L (ref 0–0.2)
BASOPHILS ABSOLUTE: 0.02 E9/L (ref 0–0.2)
BASOPHILS ABSOLUTE: 0.02 E9/L (ref 0–0.2)
BASOPHILS ABSOLUTE: 0.03 E9/L (ref 0–0.2)
BASOPHILS RELATIVE PERCENT: 0 % (ref 0–2)
BASOPHILS RELATIVE PERCENT: 0.1 % (ref 0–2)
BASOPHILS RELATIVE PERCENT: 0.2 % (ref 0–2)
BASOPHILS RELATIVE PERCENT: 0.3 % (ref 0–2)
BASOPHILS RELATIVE PERCENT: 0.6 % (ref 0–2)
BASOPHILS RELATIVE PERCENT: 1 % (ref 0–2)
BASOPHILS RELATIVE PERCENT: 1.1 % (ref 0–2)
BILIRUB SERPL-MCNC: 1 MG/DL (ref 0–1.2)
BILIRUB SERPL-MCNC: 1.3 MG/DL (ref 0–1.2)
BILIRUB SERPL-MCNC: 1.6 MG/DL (ref 0–1.2)
BILIRUB SERPL-MCNC: 1.8 MG/DL (ref 0–1.2)
BILIRUB SERPL-MCNC: 2 MG/DL (ref 0–1.2)
BILIRUB SERPL-MCNC: 2.1 MG/DL (ref 0–1.2)
BILIRUB SERPL-MCNC: 2.2 MG/DL (ref 0–1.2)
BILIRUB SERPL-MCNC: 2.4 MG/DL (ref 0–1.2)
BILIRUB SERPL-MCNC: 2.5 MG/DL (ref 0–1.2)
BILIRUB SERPL-MCNC: 2.6 MG/DL (ref 0–1.2)
BILIRUB SERPL-MCNC: 2.7 MG/DL (ref 0–1.2)
BILIRUB SERPL-MCNC: 3.2 MG/DL (ref 0–1.2)
BILIRUB SERPL-MCNC: 3.4 MG/DL (ref 0–1.2)
BILIRUB SERPL-MCNC: 3.6 MG/DL (ref 0–1.2)
BILIRUBIN DIRECT: 1.1 MG/DL (ref 0–0.3)
BILIRUBIN, INDIRECT: 0.9 MG/DL (ref 0–1)
BLOOD BANK DISPENSE STATUS: NORMAL
BLOOD BANK PRODUCT CODE: NORMAL
BODY FLUID CULTURE, STERILE: NORMAL
BPU ID: NORMAL
BUN BLDV-MCNC: 12 MG/DL (ref 8–23)
BUN BLDV-MCNC: 15 MG/DL (ref 8–23)
BUN BLDV-MCNC: 16 MG/DL (ref 8–23)
BUN BLDV-MCNC: 16 MG/DL (ref 8–23)
BUN BLDV-MCNC: 17 MG/DL (ref 8–23)
BUN BLDV-MCNC: 20 MG/DL (ref 6–23)
BUN BLDV-MCNC: 21 MG/DL (ref 8–23)
BUN BLDV-MCNC: 23 MG/DL (ref 6–23)
BUN BLDV-MCNC: 24 MG/DL (ref 6–23)
BUN BLDV-MCNC: 26 MG/DL (ref 6–23)
BUN BLDV-MCNC: 28 MG/DL (ref 6–23)
BUN BLDV-MCNC: 29 MG/DL (ref 6–23)
BUN BLDV-MCNC: 31 MG/DL (ref 6–23)
BUN BLDV-MCNC: 31 MG/DL (ref 6–23)
BUN BLDV-MCNC: 34 MG/DL (ref 6–23)
BUN BLDV-MCNC: 36 MG/DL (ref 6–23)
BUN BLDV-MCNC: 36 MG/DL (ref 6–23)
BUN BLDV-MCNC: 38 MG/DL (ref 6–23)
BUN BLDV-MCNC: 39 MG/DL (ref 6–23)
BUN BLDV-MCNC: 40 MG/DL (ref 6–23)
BUN BLDV-MCNC: 41 MG/DL (ref 6–23)
BUN BLDV-MCNC: 9 MG/DL (ref 8–23)
BURR CELLS: ABNORMAL
BURR CELLS: ABNORMAL
CALCIUM SERPL-MCNC: 7.8 MG/DL (ref 8.6–10.2)
CALCIUM SERPL-MCNC: 7.9 MG/DL (ref 8.6–10.2)
CALCIUM SERPL-MCNC: 8 MG/DL (ref 8.6–10.2)
CALCIUM SERPL-MCNC: 8.1 MG/DL (ref 8.6–10.2)
CALCIUM SERPL-MCNC: 8.3 MG/DL (ref 8.6–10.2)
CALCIUM SERPL-MCNC: 8.4 MG/DL (ref 8.6–10.2)
CALCIUM SERPL-MCNC: 8.6 MG/DL (ref 8.6–10.2)
CALCIUM SERPL-MCNC: 8.6 MG/DL (ref 8.6–10.2)
CALCIUM SERPL-MCNC: 8.7 MG/DL (ref 8.6–10.2)
CALCIUM SERPL-MCNC: 8.7 MG/DL (ref 8.6–10.2)
CALCIUM SERPL-MCNC: 8.8 MG/DL (ref 8.6–10.2)
CALCIUM SERPL-MCNC: 8.9 MG/DL (ref 8.6–10.2)
CALCIUM SERPL-MCNC: 8.9 MG/DL (ref 8.6–10.2)
CALCIUM SERPL-MCNC: 9.1 MG/DL (ref 8.6–10.2)
CEA,FLUID: 1.9 NG/ML
CELL COUNT FLUID TYPE: NORMAL
CHLORIDE BLD-SCNC: 100 MMOL/L (ref 98–107)
CHLORIDE BLD-SCNC: 101 MMOL/L (ref 98–107)
CHLORIDE BLD-SCNC: 102 MMOL/L (ref 98–107)
CHLORIDE BLD-SCNC: 103 MMOL/L (ref 98–107)
CHLORIDE BLD-SCNC: 104 MMOL/L (ref 98–107)
CHLORIDE BLD-SCNC: 105 MMOL/L (ref 98–107)
CHLORIDE BLD-SCNC: 106 MMOL/L (ref 98–107)
CHLORIDE BLD-SCNC: 88 MMOL/L (ref 98–107)
CHLORIDE BLD-SCNC: 89 MMOL/L (ref 98–107)
CHLORIDE BLD-SCNC: 90 MMOL/L (ref 98–107)
CHLORIDE BLD-SCNC: 92 MMOL/L (ref 98–107)
CHLORIDE BLD-SCNC: 93 MMOL/L (ref 98–107)
CHLORIDE BLD-SCNC: 93 MMOL/L (ref 98–107)
CHLORIDE BLD-SCNC: 96 MMOL/L (ref 98–107)
CHLORIDE BLD-SCNC: 97 MMOL/L (ref 98–107)
CHLORIDE BLD-SCNC: 99 MMOL/L (ref 98–107)
CHLORIDE URINE RANDOM: 23 MMOL/L
CHLORIDE URINE RANDOM: <20 MMOL/L
CHLORIDE URINE RANDOM: <20 MMOL/L
CO2: 21 MMOL/L (ref 22–29)
CO2: 22 MMOL/L (ref 22–29)
CO2: 23 MMOL/L (ref 22–29)
CO2: 24 MMOL/L (ref 22–29)
CO2: 25 MMOL/L (ref 22–29)
CO2: 26 MMOL/L (ref 22–29)
CO2: 27 MMOL/L (ref 22–29)
COLOR FLUID: NORMAL
CREAT SERPL-MCNC: 0.5 MG/DL (ref 0.7–1.2)
CREAT SERPL-MCNC: 0.6 MG/DL (ref 0.7–1.2)
CREAT SERPL-MCNC: 0.7 MG/DL (ref 0.7–1.2)
CREAT SERPL-MCNC: 0.7 MG/DL (ref 0.7–1.2)
CREAT SERPL-MCNC: 0.8 MG/DL (ref 0.7–1.2)
CREAT SERPL-MCNC: 0.9 MG/DL (ref 0.7–1.2)
CREAT SERPL-MCNC: 1 MG/DL (ref 0.7–1.2)
CREAT SERPL-MCNC: 1.1 MG/DL (ref 0.7–1.2)
CREAT SERPL-MCNC: 1.1 MG/DL (ref 0.7–1.2)
CREAT SERPL-MCNC: 1.2 MG/DL (ref 0.7–1.2)
CREAT SERPL-MCNC: 1.3 MG/DL (ref 0.7–1.2)
CREATININE URINE: 146 MG/DL (ref 40–278)
CREATININE URINE: 154 MG/DL (ref 40–278)
CREATININE URINE: 190 MG/DL (ref 40–278)
DESCRIPTION BLOOD BANK: NORMAL
EKG ATRIAL RATE: 105 BPM
EKG ATRIAL RATE: 97 BPM
EKG P AXIS: 62 DEGREES
EKG P AXIS: 77 DEGREES
EKG P-R INTERVAL: 142 MS
EKG P-R INTERVAL: 158 MS
EKG Q-T INTERVAL: 354 MS
EKG Q-T INTERVAL: 366 MS
EKG QRS DURATION: 72 MS
EKG QRS DURATION: 76 MS
EKG QTC CALCULATION (BAZETT): 464 MS
EKG QTC CALCULATION (BAZETT): 467 MS
EKG R AXIS: 52 DEGREES
EKG R AXIS: 54 DEGREES
EKG T AXIS: 67 DEGREES
EKG T AXIS: 86 DEGREES
EKG VENTRICULAR RATE: 105 BPM
EKG VENTRICULAR RATE: 97 BPM
EOSINOPHILS ABSOLUTE: 0.01 E9/L (ref 0.05–0.5)
EOSINOPHILS ABSOLUTE: 0.01 E9/L (ref 0.05–0.5)
EOSINOPHILS ABSOLUTE: 0.02 E9/L (ref 0.05–0.5)
EOSINOPHILS ABSOLUTE: 0.02 E9/L (ref 0.05–0.5)
EOSINOPHILS ABSOLUTE: 0.03 E9/L (ref 0.05–0.5)
EOSINOPHILS ABSOLUTE: 0.04 E9/L (ref 0.05–0.5)
EOSINOPHILS ABSOLUTE: 0.05 E9/L (ref 0.05–0.5)
EOSINOPHILS RELATIVE PERCENT: 0.1 % (ref 0–6)
EOSINOPHILS RELATIVE PERCENT: 0.1 % (ref 0–6)
EOSINOPHILS RELATIVE PERCENT: 0.5 % (ref 0–6)
EOSINOPHILS RELATIVE PERCENT: 0.6 % (ref 0–6)
EOSINOPHILS RELATIVE PERCENT: 1 % (ref 0–6)
EOSINOPHILS RELATIVE PERCENT: 1.7 % (ref 0–6)
EOSINOPHILS RELATIVE PERCENT: 2.8 % (ref 0–6)
FLUID TYPE: NORMAL
GFR AFRICAN AMERICAN: >60
GFR NON-AFRICAN AMERICAN: 55 ML/MIN/1.73
GFR NON-AFRICAN AMERICAN: >60 ML/MIN/1.73
GLUCOSE BLD-MCNC: 100 MG/DL (ref 74–99)
GLUCOSE BLD-MCNC: 101 MG/DL (ref 74–99)
GLUCOSE BLD-MCNC: 103 MG/DL (ref 74–99)
GLUCOSE BLD-MCNC: 104 MG/DL (ref 74–99)
GLUCOSE BLD-MCNC: 107 MG/DL (ref 74–99)
GLUCOSE BLD-MCNC: 107 MG/DL (ref 74–99)
GLUCOSE BLD-MCNC: 110 MG/DL (ref 74–99)
GLUCOSE BLD-MCNC: 110 MG/DL (ref 74–99)
GLUCOSE BLD-MCNC: 111 MG/DL (ref 74–99)
GLUCOSE BLD-MCNC: 117 MG/DL (ref 74–99)
GLUCOSE BLD-MCNC: 139 MG/DL (ref 74–99)
GLUCOSE BLD-MCNC: 78 MG/DL (ref 74–99)
GLUCOSE BLD-MCNC: 79 MG/DL (ref 74–99)
GLUCOSE BLD-MCNC: 80 MG/DL (ref 74–99)
GLUCOSE BLD-MCNC: 80 MG/DL (ref 74–99)
GLUCOSE BLD-MCNC: 81 MG/DL (ref 74–99)
GLUCOSE BLD-MCNC: 81 MG/DL (ref 74–99)
GLUCOSE BLD-MCNC: 84 MG/DL (ref 74–99)
GLUCOSE BLD-MCNC: 89 MG/DL (ref 74–99)
GLUCOSE BLD-MCNC: 90 MG/DL (ref 74–99)
GLUCOSE BLD-MCNC: 91 MG/DL (ref 74–99)
GLUCOSE BLD-MCNC: 94 MG/DL (ref 74–99)
GLUCOSE BLD-MCNC: 95 MG/DL (ref 74–99)
GLUCOSE BLD-MCNC: 97 MG/DL (ref 74–99)
GLUCOSE BLD-MCNC: 98 MG/DL (ref 74–99)
GLUCOSE BLD-MCNC: 98 MG/DL (ref 74–99)
GLUCOSE BLD-MCNC: 99 MG/DL (ref 74–99)
GLUCOSE, FLUID: 108 MG/DL
GLUCOSE, FLUID: 93 MG/DL
GRAM STAIN ORDERABLE: NORMAL
GRAM STAIN RESULT: NORMAL
HCT VFR BLD CALC: 23.5 % (ref 37–54)
HCT VFR BLD CALC: 26 % (ref 37–54)
HCT VFR BLD CALC: 26.4 % (ref 37–54)
HCT VFR BLD CALC: 26.5 % (ref 37–54)
HCT VFR BLD CALC: 26.7 % (ref 37–54)
HCT VFR BLD CALC: 28.2 % (ref 37–54)
HCT VFR BLD CALC: 28.4 % (ref 37–54)
HCT VFR BLD CALC: 29 % (ref 37–54)
HCT VFR BLD CALC: 29.9 % (ref 37–54)
HCT VFR BLD CALC: 30.1 % (ref 37–54)
HCT VFR BLD CALC: 31.4 % (ref 37–54)
HCT VFR BLD CALC: 37.5 % (ref 37–54)
HEMOGLOBIN: 10.9 G/DL (ref 12.5–16.5)
HEMOGLOBIN: 7.2 G/DL (ref 12.5–16.5)
HEMOGLOBIN: 8 G/DL (ref 12.5–16.5)
HEMOGLOBIN: 8 G/DL (ref 12.5–16.5)
HEMOGLOBIN: 8.2 G/DL (ref 12.5–16.5)
HEMOGLOBIN: 8.4 G/DL (ref 12.5–16.5)
HEMOGLOBIN: 8.4 G/DL (ref 12.5–16.5)
HEMOGLOBIN: 8.7 G/DL (ref 12.5–16.5)
HEMOGLOBIN: 8.7 G/DL (ref 12.5–16.5)
HEMOGLOBIN: 8.8 G/DL (ref 12.5–16.5)
HEMOGLOBIN: 8.9 G/DL (ref 12.5–16.5)
HEMOGLOBIN: 9.2 G/DL (ref 12.5–16.5)
HYPOCHROMIA: ABNORMAL
IMMATURE GRANULOCYTES #: 0.01 E9/L
IMMATURE GRANULOCYTES #: 0.02 E9/L
IMMATURE GRANULOCYTES #: 0.03 E9/L
IMMATURE GRANULOCYTES #: 0.05 E9/L
IMMATURE GRANULOCYTES #: 0.05 E9/L
IMMATURE GRANULOCYTES #: 0.18 E9/L
IMMATURE GRANULOCYTES %: 0.5 % (ref 0–5)
IMMATURE GRANULOCYTES %: 0.6 % (ref 0–5)
IMMATURE GRANULOCYTES %: 0.6 % (ref 0–5)
IMMATURE GRANULOCYTES %: 0.7 % (ref 0–5)
IMMATURE GRANULOCYTES %: 0.7 % (ref 0–5)
IMMATURE GRANULOCYTES %: 1 % (ref 0–5)
IMMATURE GRANULOCYTES %: 1.1 % (ref 0–5)
IMMATURE GRANULOCYTES %: 1.6 % (ref 0–5)
INR BLD: 1.3
INR BLD: 1.4
INR BLD: 1.5
INR BLD: 1.6
INR BLD: 1.8
LACTIC ACID, SEPSIS: 2.5 MMOL/L (ref 0.5–1.9)
LACTIC ACID, SEPSIS: 3.3 MMOL/L (ref 0.5–1.9)
LACTIC ACID: 2.2 MMOL/L (ref 0.5–2.2)
LACTIC ACID: 2.4 MMOL/L (ref 0.5–2.2)
LACTIC ACID: 2.5 MMOL/L (ref 0.5–2.2)
LACTIC ACID: 2.6 MMOL/L (ref 0.5–2.2)
LACTIC ACID: 2.6 MMOL/L (ref 0.5–2.2)
LACTIC ACID: 2.9 MMOL/L (ref 0.5–2.2)
LD, FLUID: 117 U/L
LD, FLUID: 63 U/L
LIPASE: 35 U/L (ref 13–60)
LIPASE: 37 U/L (ref 13–60)
LYMPHOCYTES ABSOLUTE: 0.2 E9/L (ref 1.5–4)
LYMPHOCYTES ABSOLUTE: 0.2 E9/L (ref 1.5–4)
LYMPHOCYTES ABSOLUTE: 0.26 E9/L (ref 1.5–4)
LYMPHOCYTES ABSOLUTE: 0.29 E9/L (ref 1.5–4)
LYMPHOCYTES ABSOLUTE: 0.34 E9/L (ref 1.5–4)
LYMPHOCYTES ABSOLUTE: 0.36 E9/L (ref 1.5–4)
LYMPHOCYTES ABSOLUTE: 0.36 E9/L (ref 1.5–4)
LYMPHOCYTES ABSOLUTE: 0.38 E9/L (ref 1.5–4)
LYMPHOCYTES ABSOLUTE: 0.64 E9/L (ref 1.5–4)
LYMPHOCYTES RELATIVE PERCENT: 10 % (ref 20–42)
LYMPHOCYTES RELATIVE PERCENT: 11 % (ref 20–42)
LYMPHOCYTES RELATIVE PERCENT: 11 % (ref 20–42)
LYMPHOCYTES RELATIVE PERCENT: 14.4 % (ref 20–42)
LYMPHOCYTES RELATIVE PERCENT: 4.1 % (ref 20–42)
LYMPHOCYTES RELATIVE PERCENT: 5.7 % (ref 20–42)
LYMPHOCYTES RELATIVE PERCENT: 6.9 % (ref 20–42)
LYMPHOCYTES RELATIVE PERCENT: 8.4 % (ref 20–42)
LYMPHOCYTES RELATIVE PERCENT: 9.5 % (ref 20–42)
MAGNESIUM: 2.3 MG/DL (ref 1.6–2.6)
MCH RBC QN AUTO: 24.2 PG (ref 26–35)
MCH RBC QN AUTO: 24.2 PG (ref 26–35)
MCH RBC QN AUTO: 24.3 PG (ref 26–35)
MCH RBC QN AUTO: 24.3 PG (ref 26–35)
MCH RBC QN AUTO: 24.4 PG (ref 26–35)
MCH RBC QN AUTO: 24.8 PG (ref 26–35)
MCH RBC QN AUTO: 24.9 PG (ref 26–35)
MCH RBC QN AUTO: 25.1 PG (ref 26–35)
MCH RBC QN AUTO: 25.5 PG (ref 26–35)
MCH RBC QN AUTO: 25.5 PG (ref 26–35)
MCH RBC QN AUTO: 26 PG (ref 26–35)
MCHC RBC AUTO-ENTMCNC: 29.1 % (ref 32–34.5)
MCHC RBC AUTO-ENTMCNC: 29.1 % (ref 32–34.5)
MCHC RBC AUTO-ENTMCNC: 29.2 % (ref 32–34.5)
MCHC RBC AUTO-ENTMCNC: 29.3 % (ref 32–34.5)
MCHC RBC AUTO-ENTMCNC: 29.8 % (ref 32–34.5)
MCHC RBC AUTO-ENTMCNC: 30 % (ref 32–34.5)
MCHC RBC AUTO-ENTMCNC: 30.2 % (ref 32–34.5)
MCHC RBC AUTO-ENTMCNC: 30.6 % (ref 32–34.5)
MCHC RBC AUTO-ENTMCNC: 30.8 % (ref 32–34.5)
MCHC RBC AUTO-ENTMCNC: 31.1 % (ref 32–34.5)
MCHC RBC AUTO-ENTMCNC: 31.5 % (ref 32–34.5)
MCV RBC AUTO: 79.4 FL (ref 80–99.9)
MCV RBC AUTO: 80.2 FL (ref 80–99.9)
MCV RBC AUTO: 80.3 FL (ref 80–99.9)
MCV RBC AUTO: 80.7 FL (ref 80–99.9)
MCV RBC AUTO: 80.9 FL (ref 80–99.9)
MCV RBC AUTO: 81.5 FL (ref 80–99.9)
MCV RBC AUTO: 83.3 FL (ref 80–99.9)
MCV RBC AUTO: 83.8 FL (ref 80–99.9)
MCV RBC AUTO: 85 FL (ref 80–99.9)
MCV RBC AUTO: 85.6 FL (ref 80–99.9)
MCV RBC AUTO: 88.8 FL (ref 80–99.9)
MONOCYTE, FLUID: 35 %
MONOCYTE, FLUID: 36 %
MONOCYTE, FLUID: 49 %
MONOCYTES ABSOLUTE: 0.2 E9/L (ref 0.1–0.95)
MONOCYTES ABSOLUTE: 0.29 E9/L (ref 0.1–0.95)
MONOCYTES ABSOLUTE: 0.32 E9/L (ref 0.1–0.95)
MONOCYTES ABSOLUTE: 0.5 E9/L (ref 0.1–0.95)
MONOCYTES ABSOLUTE: 0.5 E9/L (ref 0.1–0.95)
MONOCYTES ABSOLUTE: 0.51 E9/L (ref 0.1–0.95)
MONOCYTES ABSOLUTE: 0.61 E9/L (ref 0.1–0.95)
MONOCYTES ABSOLUTE: 0.63 E9/L (ref 0.1–0.95)
MONOCYTES ABSOLUTE: 0.84 E9/L (ref 0.1–0.95)
MONOCYTES RELATIVE PERCENT: 11 % (ref 2–12)
MONOCYTES RELATIVE PERCENT: 13.1 % (ref 2–12)
MONOCYTES RELATIVE PERCENT: 14.3 % (ref 2–12)
MONOCYTES RELATIVE PERCENT: 16 % (ref 2–12)
MONOCYTES RELATIVE PERCENT: 16.3 % (ref 2–12)
MONOCYTES RELATIVE PERCENT: 17.7 % (ref 2–12)
MONOCYTES RELATIVE PERCENT: 7.5 % (ref 2–12)
MONOCYTES RELATIVE PERCENT: 7.6 % (ref 2–12)
MONOCYTES RELATIVE PERCENT: 9.8 % (ref 2–12)
MYELOCYTE PERCENT: 1 % (ref 0–0)
NEUTROPHIL, FLUID: 51 %
NEUTROPHIL, FLUID: 64 %
NEUTROPHIL, FLUID: 65 %
NEUTROPHILS ABSOLUTE: 1.17 E9/L (ref 1.8–7.3)
NEUTROPHILS ABSOLUTE: 1.24 E9/L (ref 1.8–7.3)
NEUTROPHILS ABSOLUTE: 1.37 E9/L (ref 1.8–7.3)
NEUTROPHILS ABSOLUTE: 2.21 E9/L (ref 1.8–7.3)
NEUTROPHILS ABSOLUTE: 2.86 E9/L (ref 1.8–7.3)
NEUTROPHILS ABSOLUTE: 3.25 E9/L (ref 1.8–7.3)
NEUTROPHILS ABSOLUTE: 4.22 E9/L (ref 1.8–7.3)
NEUTROPHILS ABSOLUTE: 7.3 E9/L (ref 1.8–7.3)
NEUTROPHILS ABSOLUTE: 9.44 E9/L (ref 1.8–7.3)
NEUTROPHILS RELATIVE PERCENT: 64.5 % (ref 43–80)
NEUTROPHILS RELATIVE PERCENT: 68.5 % (ref 43–80)
NEUTROPHILS RELATIVE PERCENT: 72.2 % (ref 43–80)
NEUTROPHILS RELATIVE PERCENT: 75 % (ref 43–80)
NEUTROPHILS RELATIVE PERCENT: 75.1 % (ref 43–80)
NEUTROPHILS RELATIVE PERCENT: 76.1 % (ref 43–80)
NEUTROPHILS RELATIVE PERCENT: 81.5 % (ref 43–80)
NEUTROPHILS RELATIVE PERCENT: 84.8 % (ref 43–80)
NEUTROPHILS RELATIVE PERCENT: 87.5 % (ref 43–80)
NUCLEATED CELLS FLUID: 260 /UL
NUCLEATED CELLS FLUID: 375 /UL
NUCLEATED CELLS FLUID: 688 /UL
OSMOLALITY URINE: 800 MOSM/KG (ref 300–900)
OSMOLALITY URINE: 823 MOSM/KG (ref 300–900)
OVALOCYTES: ABNORMAL
PDW BLD-RTO: 18.5 FL (ref 11.5–15)
PDW BLD-RTO: 19.8 FL (ref 11.5–15)
PDW BLD-RTO: 20.1 FL (ref 11.5–15)
PDW BLD-RTO: 20.3 FL (ref 11.5–15)
PDW BLD-RTO: 20.7 FL (ref 11.5–15)
PDW BLD-RTO: 20.8 FL (ref 11.5–15)
PDW BLD-RTO: 21.1 FL (ref 11.5–15)
PDW BLD-RTO: 21.1 FL (ref 11.5–15)
PDW BLD-RTO: 21.2 FL (ref 11.5–15)
PDW BLD-RTO: 23.9 FL (ref 11.5–15)
PDW BLD-RTO: 25.6 FL (ref 11.5–15)
PLATELET # BLD: 121 E9/L (ref 130–450)
PLATELET # BLD: 164 E9/L (ref 130–450)
PLATELET # BLD: 53 E9/L (ref 130–450)
PLATELET # BLD: 54 E9/L (ref 130–450)
PLATELET # BLD: 73 E9/L (ref 130–450)
PLATELET # BLD: 82 E9/L (ref 130–450)
PLATELET # BLD: 82 E9/L (ref 130–450)
PLATELET # BLD: 85 E9/L (ref 130–450)
PLATELET # BLD: 90 E9/L (ref 130–450)
PLATELET # BLD: 93 E9/L (ref 130–450)
PLATELET # BLD: 96 E9/L (ref 130–450)
PLATELET # BLD: 98 E9/L (ref 130–450)
PLATELET CONFIRMATION: NORMAL
PMV BLD AUTO: 10 FL (ref 7–12)
PMV BLD AUTO: 10.4 FL (ref 7–12)
PMV BLD AUTO: 10.5 FL (ref 7–12)
PMV BLD AUTO: 10.6 FL (ref 7–12)
PMV BLD AUTO: 10.7 FL (ref 7–12)
PMV BLD AUTO: 10.9 FL (ref 7–12)
PMV BLD AUTO: 9.4 FL (ref 7–12)
PMV BLD AUTO: 9.7 FL (ref 7–12)
PMV BLD AUTO: ABNORMAL FL (ref 7–12)
POIKILOCYTES: ABNORMAL
POLYCHROMASIA: ABNORMAL
POTASSIUM REFLEX MAGNESIUM: 3.9 MMOL/L (ref 3.5–5)
POTASSIUM REFLEX MAGNESIUM: 4 MMOL/L (ref 3.5–5)
POTASSIUM REFLEX MAGNESIUM: 4 MMOL/L (ref 3.5–5)
POTASSIUM SERPL-SCNC: 3.7 MMOL/L (ref 3.5–5)
POTASSIUM SERPL-SCNC: 3.7 MMOL/L (ref 3.5–5)
POTASSIUM SERPL-SCNC: 3.8 MMOL/L (ref 3.5–5)
POTASSIUM SERPL-SCNC: 3.8 MMOL/L (ref 3.5–5)
POTASSIUM SERPL-SCNC: 4.1 MMOL/L (ref 3.5–5)
POTASSIUM SERPL-SCNC: 4.2 MMOL/L (ref 3.5–5)
POTASSIUM SERPL-SCNC: 4.3 MMOL/L (ref 3.5–5)
POTASSIUM SERPL-SCNC: 4.4 MMOL/L (ref 3.5–5)
POTASSIUM SERPL-SCNC: 4.5 MMOL/L (ref 3.5–5)
POTASSIUM SERPL-SCNC: 4.7 MMOL/L (ref 3.5–5)
POTASSIUM SERPL-SCNC: 4.7 MMOL/L (ref 3.5–5)
POTASSIUM SERPL-SCNC: 4.8 MMOL/L (ref 3.5–5)
POTASSIUM SERPL-SCNC: 4.8 MMOL/L (ref 3.5–5)
POTASSIUM SERPL-SCNC: 4.9 MMOL/L (ref 3.5–5)
POTASSIUM SERPL-SCNC: 5 MMOL/L (ref 3.5–5)
POTASSIUM SERPL-SCNC: 5.2 MMOL/L (ref 3.5–5)
POTASSIUM SERPL-SCNC: 5.4 MMOL/L (ref 3.5–5)
POTASSIUM SERPL-SCNC: 5.5 MMOL/L (ref 3.5–5)
POTASSIUM SERPL-SCNC: 5.6 MMOL/L (ref 3.5–5)
POTASSIUM SERPL-SCNC: 5.7 MMOL/L (ref 3.5–5)
POTASSIUM SERPL-SCNC: 5.7 MMOL/L (ref 3.5–5)
POTASSIUM SERPL-SCNC: 6 MMOL/L (ref 3.5–5)
POTASSIUM, UR: 58.8 MMOL/L
POTASSIUM, UR: 69.2 MMOL/L
POTASSIUM, UR: 84.1 MMOL/L
PROTEIN FLUID: 0.6 G/DL
PROTEIN FLUID: 1 G/DL
PROTEIN FLUID: 1.2 G/DL
PROTHROMBIN TIME: 13.9 SEC (ref 9.3–12.4)
PROTHROMBIN TIME: 14.6 SEC (ref 9.3–12.4)
PROTHROMBIN TIME: 14.9 SEC (ref 9.3–12.4)
PROTHROMBIN TIME: 14.9 SEC (ref 9.3–12.4)
PROTHROMBIN TIME: 15 SEC (ref 9.3–12.4)
PROTHROMBIN TIME: 15.1 SEC (ref 9.3–12.4)
PROTHROMBIN TIME: 15.3 SEC (ref 9.3–12.4)
PROTHROMBIN TIME: 15.4 SEC (ref 9.3–12.4)
PROTHROMBIN TIME: 16.3 SEC (ref 9.3–12.4)
PROTHROMBIN TIME: 16.5 SEC (ref 9.3–12.4)
PROTHROMBIN TIME: 16.6 SEC (ref 9.3–12.4)
PROTHROMBIN TIME: 16.8 SEC (ref 9.3–12.4)
PROTHROMBIN TIME: 19.7 SEC (ref 9.3–12.4)
RBC # BLD: 2.96 E12/L (ref 3.8–5.8)
RBC # BLD: 3.22 E12/L (ref 3.8–5.8)
RBC # BLD: 3.29 E12/L (ref 3.8–5.8)
RBC # BLD: 3.3 E12/L (ref 3.8–5.8)
RBC # BLD: 3.3 E12/L (ref 3.8–5.8)
RBC # BLD: 3.39 E12/L (ref 3.8–5.8)
RBC # BLD: 3.41 E12/L (ref 3.8–5.8)
RBC # BLD: 3.46 E12/L (ref 3.8–5.8)
RBC # BLD: 3.57 E12/L (ref 3.8–5.8)
RBC # BLD: 3.67 E12/L (ref 3.8–5.8)
RBC # BLD: 4.5 E12/L (ref 3.8–5.8)
RBC FLUID: NORMAL /UL
REASON FOR REJECTION: NORMAL
REJECTED TEST: NORMAL
SARS-COV-2, PCR: NOT DETECTED
SARS-COV-2, PCR: NOT DETECTED
SARS-COV-2: NOT DETECTED
SARS-COV-2: NOT DETECTED
SCHISTOCYTES: ABNORMAL
SCHISTOCYTES: ABNORMAL
SODIUM BLD-SCNC: 122 MMOL/L (ref 132–146)
SODIUM BLD-SCNC: 122 MMOL/L (ref 132–146)
SODIUM BLD-SCNC: 123 MMOL/L (ref 132–146)
SODIUM BLD-SCNC: 123 MMOL/L (ref 132–146)
SODIUM BLD-SCNC: 124 MMOL/L (ref 132–146)
SODIUM BLD-SCNC: 125 MMOL/L (ref 132–146)
SODIUM BLD-SCNC: 126 MMOL/L (ref 132–146)
SODIUM BLD-SCNC: 126 MMOL/L (ref 132–146)
SODIUM BLD-SCNC: 127 MMOL/L (ref 132–146)
SODIUM BLD-SCNC: 128 MMOL/L (ref 132–146)
SODIUM BLD-SCNC: 129 MMOL/L (ref 132–146)
SODIUM BLD-SCNC: 130 MMOL/L (ref 132–146)
SODIUM BLD-SCNC: 131 MMOL/L (ref 132–146)
SODIUM BLD-SCNC: 131 MMOL/L (ref 132–146)
SODIUM BLD-SCNC: 133 MMOL/L (ref 132–146)
SODIUM BLD-SCNC: 134 MMOL/L (ref 132–146)
SODIUM BLD-SCNC: 136 MMOL/L (ref 132–146)
SODIUM BLD-SCNC: 136 MMOL/L (ref 132–146)
SODIUM URINE: <20 MMOL/L
SOURCE: NORMAL
SOURCE: NORMAL
STOMATOCYTES: ABNORMAL
TARGET CELLS: ABNORMAL
TEAR DROP CELLS: ABNORMAL
TOTAL PROTEIN: 4.9 G/DL (ref 6.4–8.3)
TOTAL PROTEIN: 4.9 G/DL (ref 6.4–8.3)
TOTAL PROTEIN: 5 G/DL (ref 6.4–8.3)
TOTAL PROTEIN: 5.2 G/DL (ref 6.4–8.3)
TOTAL PROTEIN: 5.3 G/DL (ref 6.4–8.3)
TOTAL PROTEIN: 5.3 G/DL (ref 6.4–8.3)
TOTAL PROTEIN: 5.4 G/DL (ref 6.4–8.3)
TOTAL PROTEIN: 5.4 G/DL (ref 6.4–8.3)
TOTAL PROTEIN: 5.5 G/DL (ref 6.4–8.3)
TOTAL PROTEIN: 5.5 G/DL (ref 6.4–8.3)
TOTAL PROTEIN: 5.6 G/DL (ref 6.4–8.3)
TOTAL PROTEIN: 6 G/DL (ref 6.4–8.3)
TOTAL PROTEIN: 6.3 G/DL (ref 6.4–8.3)
TOTAL PROTEIN: 6.6 G/DL (ref 6.4–8.3)
TOXIC GRANULATION: ABNORMAL
TROPONIN: <0.01 NG/ML (ref 0–0.03)
TROPONIN: <0.01 NG/ML (ref 0–0.03)
UREA NITROGEN, UR: 1126 MG/DL (ref 800–1666)
UREA NITROGEN, UR: 1153 MG/DL (ref 800–1666)
UREA NITROGEN, UR: 1281 MG/DL (ref 800–1666)
VACUOLATED NEUTROPHILS: ABNORMAL
WBC # BLD: 1.8 E9/L (ref 4.5–11.5)
WBC # BLD: 11.1 E9/L (ref 4.5–11.5)
WBC # BLD: 2.1 E9/L (ref 4.5–11.5)
WBC # BLD: 2.5 E9/L (ref 4.5–11.5)
WBC # BLD: 3.1 E9/L (ref 4.5–11.5)
WBC # BLD: 3.8 E9/L (ref 4.5–11.5)
WBC # BLD: 4.3 E9/L (ref 4.5–11.5)
WBC # BLD: 5.2 E9/L (ref 4.5–11.5)
WBC # BLD: 8.3 E9/L (ref 4.5–11.5)

## 2021-01-01 PROCEDURE — 6360000004 HC RX CONTRAST MEDICATION: Performed by: RADIOLOGY

## 2021-01-01 PROCEDURE — P9047 ALBUMIN (HUMAN), 25%, 50ML: HCPCS | Performed by: SURGERY

## 2021-01-01 PROCEDURE — 2580000003 HC RX 258: Performed by: CLINICAL NURSE SPECIALIST

## 2021-01-01 PROCEDURE — 1200000000 HC SEMI PRIVATE

## 2021-01-01 PROCEDURE — 49418 INSERT TUN IP CATH PERC: CPT | Performed by: RADIOLOGY

## 2021-01-01 PROCEDURE — 99213 OFFICE O/P EST LOW 20 MIN: CPT | Performed by: TRANSPLANT SURGERY

## 2021-01-01 PROCEDURE — 1036F TOBACCO NON-USER: CPT | Performed by: TRANSPLANT SURGERY

## 2021-01-01 PROCEDURE — 6370000000 HC RX 637 (ALT 250 FOR IP): Performed by: INTERNAL MEDICINE

## 2021-01-01 PROCEDURE — 6360000002 HC RX W HCPCS: Performed by: NURSE PRACTITIONER

## 2021-01-01 PROCEDURE — 6370000000 HC RX 637 (ALT 250 FOR IP): Performed by: FAMILY MEDICINE

## 2021-01-01 PROCEDURE — 3017F COLORECTAL CA SCREEN DOC REV: CPT | Performed by: TRANSPLANT SURGERY

## 2021-01-01 PROCEDURE — 36415 COLL VENOUS BLD VENIPUNCTURE: CPT

## 2021-01-01 PROCEDURE — 84540 ASSAY OF URINE/UREA-N: CPT

## 2021-01-01 PROCEDURE — 97530 THERAPEUTIC ACTIVITIES: CPT

## 2021-01-01 PROCEDURE — 2580000003 HC RX 258: Performed by: INTERNAL MEDICINE

## 2021-01-01 PROCEDURE — 85018 HEMOGLOBIN: CPT

## 2021-01-01 PROCEDURE — U0003 INFECTIOUS AGENT DETECTION BY NUCLEIC ACID (DNA OR RNA); SEVERE ACUTE RESPIRATORY SYNDROME CORONAVIRUS 2 (SARS-COV-2) (CORONAVIRUS DISEASE [COVID-19]), AMPLIFIED PROBE TECHNIQUE, MAKING USE OF HIGH THROUGHPUT TECHNOLOGIES AS DESCRIBED BY CMS-2020-01-R: HCPCS

## 2021-01-01 PROCEDURE — G8417 CALC BMI ABV UP PARAM F/U: HCPCS | Performed by: TRANSPLANT SURGERY

## 2021-01-01 PROCEDURE — 2580000003 HC RX 258: Performed by: NURSE ANESTHETIST, CERTIFIED REGISTERED

## 2021-01-01 PROCEDURE — 82150 ASSAY OF AMYLASE: CPT

## 2021-01-01 PROCEDURE — 82140 ASSAY OF AMMONIA: CPT

## 2021-01-01 PROCEDURE — G8484 FLU IMMUNIZE NO ADMIN: HCPCS | Performed by: TRANSPLANT SURGERY

## 2021-01-01 PROCEDURE — 88112 CYTOPATH CELL ENHANCE TECH: CPT

## 2021-01-01 PROCEDURE — 74175 CTA ABDOMEN W/CONTRAST: CPT

## 2021-01-01 PROCEDURE — 36430 TRANSFUSION BLD/BLD COMPNT: CPT

## 2021-01-01 PROCEDURE — 99283 EMERGENCY DEPT VISIT LOW MDM: CPT

## 2021-01-01 PROCEDURE — 6360000002 HC RX W HCPCS: Performed by: CLINICAL NURSE SPECIALIST

## 2021-01-01 PROCEDURE — 36591 DRAW BLOOD OFF VENOUS DEVICE: CPT

## 2021-01-01 PROCEDURE — 2720000010 HC SURG SUPPLY STERILE: Performed by: INTERNAL MEDICINE

## 2021-01-01 PROCEDURE — 6360000002 HC RX W HCPCS: Performed by: INTERNAL MEDICINE

## 2021-01-01 PROCEDURE — 87070 CULTURE OTHR SPECIMN AEROBIC: CPT

## 2021-01-01 PROCEDURE — 84484 ASSAY OF TROPONIN QUANT: CPT

## 2021-01-01 PROCEDURE — 82570 ASSAY OF URINE CREATININE: CPT

## 2021-01-01 PROCEDURE — 49083 ABD PARACENTESIS W/IMAGING: CPT

## 2021-01-01 PROCEDURE — 80053 COMPREHEN METABOLIC PANEL: CPT

## 2021-01-01 PROCEDURE — 2580000003 HC RX 258: Performed by: FAMILY MEDICINE

## 2021-01-01 PROCEDURE — 85610 PROTHROMBIN TIME: CPT

## 2021-01-01 PROCEDURE — 83935 ASSAY OF URINE OSMOLALITY: CPT

## 2021-01-01 PROCEDURE — 7100000010 HC PHASE II RECOVERY - FIRST 15 MIN

## 2021-01-01 PROCEDURE — 85025 COMPLETE CBC W/AUTO DIFF WBC: CPT

## 2021-01-01 PROCEDURE — 99232 SBSQ HOSP IP/OBS MODERATE 35: CPT | Performed by: INTERNAL MEDICINE

## 2021-01-01 PROCEDURE — 84300 ASSAY OF URINE SODIUM: CPT

## 2021-01-01 PROCEDURE — 84133 ASSAY OF URINE POTASSIUM: CPT

## 2021-01-01 PROCEDURE — 82436 ASSAY OF URINE CHLORIDE: CPT

## 2021-01-01 PROCEDURE — 6360000002 HC RX W HCPCS: Performed by: FAMILY MEDICINE

## 2021-01-01 PROCEDURE — 1123F ACP DISCUSS/DSCN MKR DOCD: CPT | Performed by: TRANSPLANT SURGERY

## 2021-01-01 PROCEDURE — 97161 PT EVAL LOW COMPLEX 20 MIN: CPT

## 2021-01-01 PROCEDURE — 4040F PNEUMOC VAC/ADMIN/RCVD: CPT | Performed by: TRANSPLANT SURGERY

## 2021-01-01 PROCEDURE — 83615 LACTATE (LD) (LDH) ENZYME: CPT

## 2021-01-01 PROCEDURE — 85730 THROMBOPLASTIN TIME PARTIAL: CPT

## 2021-01-01 PROCEDURE — 88305 TISSUE EXAM BY PATHOLOGIST: CPT

## 2021-01-01 PROCEDURE — 87205 SMEAR GRAM STAIN: CPT

## 2021-01-01 PROCEDURE — 89051 BODY FLUID CELL COUNT: CPT

## 2021-01-01 PROCEDURE — 82378 CARCINOEMBRYONIC ANTIGEN: CPT

## 2021-01-01 PROCEDURE — 82042 OTHER SOURCE ALBUMIN QUAN EA: CPT

## 2021-01-01 PROCEDURE — 83605 ASSAY OF LACTIC ACID: CPT

## 2021-01-01 PROCEDURE — 7100000011 HC PHASE II RECOVERY - ADDTL 15 MIN

## 2021-01-01 PROCEDURE — 49083 ABD PARACENTESIS W/IMAGING: CPT | Performed by: RADIOLOGY

## 2021-01-01 PROCEDURE — 96365 THER/PROPH/DIAG IV INF INIT: CPT

## 2021-01-01 PROCEDURE — 74177 CT ABD & PELVIS W/CONTRAST: CPT

## 2021-01-01 PROCEDURE — 80076 HEPATIC FUNCTION PANEL: CPT

## 2021-01-01 PROCEDURE — U0002 COVID-19 LAB TEST NON-CDC: HCPCS

## 2021-01-01 PROCEDURE — 84295 ASSAY OF SERUM SODIUM: CPT

## 2021-01-01 PROCEDURE — P9047 ALBUMIN (HUMAN), 25%, 50ML: HCPCS | Performed by: INTERNAL MEDICINE

## 2021-01-01 PROCEDURE — P9047 ALBUMIN (HUMAN), 25%, 50ML: HCPCS | Performed by: PHYSICIAN ASSISTANT

## 2021-01-01 PROCEDURE — 99212 OFFICE O/P EST SF 10 MIN: CPT | Performed by: TRANSPLANT SURGERY

## 2021-01-01 PROCEDURE — 86900 BLOOD TYPING SEROLOGIC ABO: CPT

## 2021-01-01 PROCEDURE — 6360000002 HC RX W HCPCS: Performed by: ANESTHESIOLOGY

## 2021-01-01 PROCEDURE — 83735 ASSAY OF MAGNESIUM: CPT

## 2021-01-01 PROCEDURE — 82105 ALPHA-FETOPROTEIN SERUM: CPT

## 2021-01-01 PROCEDURE — U0005 INFEC AGEN DETEC AMPLI PROBE: HCPCS

## 2021-01-01 PROCEDURE — 6360000002 HC RX W HCPCS: Performed by: PHYSICIAN ASSISTANT

## 2021-01-01 PROCEDURE — 1036F TOBACCO NON-USER: CPT | Performed by: NURSE PRACTITIONER

## 2021-01-01 PROCEDURE — 85014 HEMATOCRIT: CPT

## 2021-01-01 PROCEDURE — 3700000001 HC ADD 15 MINUTES (ANESTHESIA): Performed by: INTERNAL MEDICINE

## 2021-01-01 PROCEDURE — 82947 ASSAY GLUCOSE BLOOD QUANT: CPT

## 2021-01-01 PROCEDURE — 97165 OT EVAL LOW COMPLEX 30 MIN: CPT

## 2021-01-01 PROCEDURE — 2580000003 HC RX 258

## 2021-01-01 PROCEDURE — G8484 FLU IMMUNIZE NO ADMIN: HCPCS | Performed by: NURSE PRACTITIONER

## 2021-01-01 PROCEDURE — 93010 ELECTROCARDIOGRAM REPORT: CPT | Performed by: INTERNAL MEDICINE

## 2021-01-01 PROCEDURE — 93005 ELECTROCARDIOGRAM TRACING: CPT | Performed by: PHYSICIAN ASSISTANT

## 2021-01-01 PROCEDURE — 83690 ASSAY OF LIPASE: CPT

## 2021-01-01 PROCEDURE — 86901 BLOOD TYPING SEROLOGIC RH(D): CPT

## 2021-01-01 PROCEDURE — 74175 CTA ABDOMEN W/CONTRAST: CPT | Performed by: RADIOLOGY

## 2021-01-01 PROCEDURE — 99205 OFFICE O/P NEW HI 60 MIN: CPT | Performed by: NURSE PRACTITIONER

## 2021-01-01 PROCEDURE — G8428 CUR MEDS NOT DOCUMENT: HCPCS | Performed by: NURSE PRACTITIONER

## 2021-01-01 PROCEDURE — G8427 DOCREV CUR MEDS BY ELIG CLIN: HCPCS | Performed by: TRANSPLANT SURGERY

## 2021-01-01 PROCEDURE — 2580000003 HC RX 258: Performed by: RADIOLOGY

## 2021-01-01 PROCEDURE — 4040F PNEUMOC VAC/ADMIN/RCVD: CPT | Performed by: NURSE PRACTITIONER

## 2021-01-01 PROCEDURE — 3017F COLORECTAL CA SCREEN DOC REV: CPT | Performed by: NURSE PRACTITIONER

## 2021-01-01 PROCEDURE — 86923 COMPATIBILITY TEST ELECTRIC: CPT

## 2021-01-01 PROCEDURE — 85049 AUTOMATED PLATELET COUNT: CPT

## 2021-01-01 PROCEDURE — 6360000002 HC RX W HCPCS: Performed by: STUDENT IN AN ORGANIZED HEALTH CARE EDUCATION/TRAINING PROGRAM

## 2021-01-01 PROCEDURE — 51798 US URINE CAPACITY MEASURE: CPT

## 2021-01-01 PROCEDURE — 2500000003 HC RX 250 WO HCPCS: Performed by: RADIOLOGY

## 2021-01-01 PROCEDURE — 2709999900 US GUIDED PARACENTESIS

## 2021-01-01 PROCEDURE — 99203 OFFICE O/P NEW LOW 30 MIN: CPT | Performed by: NURSE PRACTITIONER

## 2021-01-01 PROCEDURE — 85027 COMPLETE CBC AUTOMATED: CPT

## 2021-01-01 PROCEDURE — 7100000010 HC PHASE II RECOVERY - FIRST 15 MIN: Performed by: INTERNAL MEDICINE

## 2021-01-01 PROCEDURE — 96375 TX/PRO/DX INJ NEW DRUG ADDON: CPT

## 2021-01-01 PROCEDURE — 3609017100 HC EGD: Performed by: INTERNAL MEDICINE

## 2021-01-01 PROCEDURE — 99284 EMERGENCY DEPT VISIT MOD MDM: CPT

## 2021-01-01 PROCEDURE — 99232 SBSQ HOSP IP/OBS MODERATE 35: CPT | Performed by: STUDENT IN AN ORGANIZED HEALTH CARE EDUCATION/TRAINING PROGRAM

## 2021-01-01 PROCEDURE — P9047 ALBUMIN (HUMAN), 25%, 50ML: HCPCS | Performed by: NURSE PRACTITIONER

## 2021-01-01 PROCEDURE — 71045 X-RAY EXAM CHEST 1 VIEW: CPT

## 2021-01-01 PROCEDURE — 80048 BASIC METABOLIC PNL TOTAL CA: CPT

## 2021-01-01 PROCEDURE — P9016 RBC LEUKOCYTES REDUCED: HCPCS

## 2021-01-01 PROCEDURE — 7100000011 HC PHASE II RECOVERY - ADDTL 15 MIN: Performed by: INTERNAL MEDICINE

## 2021-01-01 PROCEDURE — 2709999900 HC NON-CHARGEABLE SUPPLY: Performed by: INTERNAL MEDICINE

## 2021-01-01 PROCEDURE — 86850 RBC ANTIBODY SCREEN: CPT

## 2021-01-01 PROCEDURE — 2580000003 HC RX 258: Performed by: EMERGENCY MEDICINE

## 2021-01-01 PROCEDURE — 2709999900 IR GUIDED TUNNELED INTRAPERITONEAL CATH W OR WO CONTRAST PERC

## 2021-01-01 PROCEDURE — 93005 ELECTROCARDIOGRAM TRACING: CPT | Performed by: EMERGENCY MEDICINE

## 2021-01-01 PROCEDURE — G8420 CALC BMI NORM PARAMETERS: HCPCS | Performed by: NURSE PRACTITIONER

## 2021-01-01 PROCEDURE — 49082 ABD PARACENTESIS: CPT

## 2021-01-01 PROCEDURE — 97535 SELF CARE MNGMENT TRAINING: CPT

## 2021-01-01 PROCEDURE — 6360000002 HC RX W HCPCS: Performed by: SURGERY

## 2021-01-01 PROCEDURE — 49418 INSERT TUN IP CATH PERC: CPT

## 2021-01-01 PROCEDURE — 6360000002 HC RX W HCPCS: Performed by: RADIOLOGY

## 2021-01-01 PROCEDURE — 6360000002 HC RX W HCPCS: Performed by: NURSE ANESTHETIST, CERTIFIED REGISTERED

## 2021-01-01 PROCEDURE — 99223 1ST HOSP IP/OBS HIGH 75: CPT | Performed by: PHYSICIAN ASSISTANT

## 2021-01-01 PROCEDURE — 1123F ACP DISCUSS/DSCN MKR DOCD: CPT | Performed by: NURSE PRACTITIONER

## 2021-01-01 PROCEDURE — 84157 ASSAY OF PROTEIN OTHER: CPT

## 2021-01-01 PROCEDURE — 3700000000 HC ANESTHESIA ATTENDED CARE: Performed by: INTERNAL MEDICINE

## 2021-01-01 PROCEDURE — C1729 CATH, DRAINAGE: HCPCS

## 2021-01-01 PROCEDURE — G8420 CALC BMI NORM PARAMETERS: HCPCS | Performed by: TRANSPLANT SURGERY

## 2021-01-01 RX ORDER — FENTANYL CITRATE 50 UG/ML
INJECTION, SOLUTION INTRAMUSCULAR; INTRAVENOUS
Status: COMPLETED | OUTPATIENT
Start: 2021-01-01 | End: 2021-01-01

## 2021-01-01 RX ORDER — AMOXICILLIN 250 MG
2 CAPSULE ORAL DAILY PRN
Qty: 180 TABLET | Refills: 0 | Status: SHIPPED | OUTPATIENT
Start: 2021-01-01 | End: 2021-06-27

## 2021-01-01 RX ORDER — ALBUMIN (HUMAN) 12.5 G/50ML
25 SOLUTION INTRAVENOUS ONCE
Status: DISCONTINUED | OUTPATIENT
Start: 2021-01-01 | End: 2021-01-01

## 2021-01-01 RX ORDER — SODIUM CHLORIDE 9 MG/ML
INJECTION, SOLUTION INTRAVENOUS CONTINUOUS PRN
Status: DISCONTINUED | OUTPATIENT
Start: 2021-01-01 | End: 2021-01-01 | Stop reason: SDUPTHER

## 2021-01-01 RX ORDER — ALBUMIN (HUMAN) 12.5 G/50ML
75 SOLUTION INTRAVENOUS SEE ADMIN INSTRUCTIONS
Status: DISCONTINUED | OUTPATIENT
Start: 2021-01-01 | End: 2021-01-01 | Stop reason: SDUPTHER

## 2021-01-01 RX ORDER — SENNA AND DOCUSATE SODIUM 50; 8.6 MG/1; MG/1
2 TABLET, FILM COATED ORAL DAILY PRN
Status: DISCONTINUED | OUTPATIENT
Start: 2021-01-01 | End: 2021-01-01 | Stop reason: HOSPADM

## 2021-01-01 RX ORDER — ACETAMINOPHEN 500 MG
500 TABLET ORAL EVERY 6 HOURS PRN
Status: ON HOLD | COMMUNITY
End: 2021-01-01 | Stop reason: HOSPADM

## 2021-01-01 RX ORDER — SODIUM CHLORIDE 0.9 % (FLUSH) 0.9 %
SYRINGE (ML) INJECTION
Status: COMPLETED
Start: 2021-01-01 | End: 2021-01-01

## 2021-01-01 RX ORDER — PROPOFOL 10 MG/ML
INJECTION, EMULSION INTRAVENOUS PRN
Status: DISCONTINUED | OUTPATIENT
Start: 2021-01-01 | End: 2021-01-01 | Stop reason: SDUPTHER

## 2021-01-01 RX ORDER — ALBUMIN (HUMAN) 12.5 G/50ML
50 SOLUTION INTRAVENOUS ONCE
Status: COMPLETED | OUTPATIENT
Start: 2021-01-01 | End: 2021-01-01

## 2021-01-01 RX ORDER — HEPARIN SODIUM (PORCINE) LOCK FLUSH IV SOLN 100 UNIT/ML 100 UNIT/ML
300 SOLUTION INTRAVENOUS 2 TIMES DAILY
Status: DISCONTINUED | OUTPATIENT
Start: 2021-01-01 | End: 2021-01-01 | Stop reason: HOSPADM

## 2021-01-01 RX ORDER — SODIUM CHLORIDE 1000 MG
1 TABLET, SOLUBLE MISCELLANEOUS 2 TIMES DAILY WITH MEALS
Status: COMPLETED | OUTPATIENT
Start: 2021-01-01 | End: 2021-01-01

## 2021-01-01 RX ORDER — ONDANSETRON 2 MG/ML
4 INJECTION INTRAMUSCULAR; INTRAVENOUS EVERY 6 HOURS PRN
Status: DISCONTINUED | OUTPATIENT
Start: 2021-01-01 | End: 2021-01-01 | Stop reason: HOSPADM

## 2021-01-01 RX ORDER — 0.9 % SODIUM CHLORIDE 0.9 %
500 INTRAVENOUS SOLUTION INTRAVENOUS ONCE
Status: COMPLETED | OUTPATIENT
Start: 2021-01-01 | End: 2021-01-01

## 2021-01-01 RX ORDER — ACETAMINOPHEN 500 MG
500 TABLET ORAL EVERY 6 HOURS PRN
Status: DISCONTINUED | OUTPATIENT
Start: 2021-01-01 | End: 2021-01-01 | Stop reason: HOSPADM

## 2021-01-01 RX ORDER — ONDANSETRON HYDROCHLORIDE 8 MG/1
8 TABLET, FILM COATED ORAL EVERY 8 HOURS PRN
COMMUNITY
End: 2021-01-01 | Stop reason: ALTCHOICE

## 2021-01-01 RX ORDER — SODIUM CHLORIDE 0.9 % (FLUSH) 0.9 %
10 SYRINGE (ML) INJECTION EVERY 12 HOURS SCHEDULED
Status: CANCELLED | OUTPATIENT
Start: 2021-01-01

## 2021-01-01 RX ORDER — SODIUM CHLORIDE 0.9 % (FLUSH) 0.9 %
10 SYRINGE (ML) INJECTION PRN
Status: CANCELLED | OUTPATIENT
Start: 2021-01-01

## 2021-01-01 RX ORDER — MIRTAZAPINE 15 MG/1
15 TABLET, FILM COATED ORAL NIGHTLY
Status: DISCONTINUED | OUTPATIENT
Start: 2021-01-01 | End: 2021-01-01 | Stop reason: HOSPADM

## 2021-01-01 RX ORDER — SODIUM CHLORIDE 9 MG/ML
INJECTION, SOLUTION INTRAVENOUS CONTINUOUS
Status: DISCONTINUED | OUTPATIENT
Start: 2021-01-01 | End: 2021-01-01

## 2021-01-01 RX ORDER — PANTOPRAZOLE SODIUM 40 MG/1
40 TABLET, DELAYED RELEASE ORAL
Status: DISCONTINUED | OUTPATIENT
Start: 2021-01-01 | End: 2021-01-01 | Stop reason: HOSPADM

## 2021-01-01 RX ORDER — TRAMADOL HYDROCHLORIDE 50 MG/1
50 TABLET ORAL EVERY 6 HOURS PRN
Status: DISCONTINUED | OUTPATIENT
Start: 2021-01-01 | End: 2021-01-01

## 2021-01-01 RX ORDER — MIRTAZAPINE 15 MG/1
15 TABLET, FILM COATED ORAL NIGHTLY
Qty: 30 TABLET | Refills: 0 | Status: ON HOLD
Start: 2021-01-01 | End: 2021-01-01 | Stop reason: ALTCHOICE

## 2021-01-01 RX ORDER — ALBUMIN (HUMAN) 12.5 G/50ML
25 SOLUTION INTRAVENOUS EVERY 6 HOURS
Status: COMPLETED | OUTPATIENT
Start: 2021-01-01 | End: 2021-01-01

## 2021-01-01 RX ORDER — ALBUMIN (HUMAN) 12.5 G/50ML
25 SOLUTION INTRAVENOUS 2 TIMES DAILY
Status: COMPLETED | OUTPATIENT
Start: 2021-01-01 | End: 2021-01-01

## 2021-01-01 RX ORDER — DICYCLOMINE HYDROCHLORIDE 10 MG/ML
20 INJECTION INTRAMUSCULAR 4 TIMES DAILY
Status: DISCONTINUED | OUTPATIENT
Start: 2021-01-01 | End: 2021-01-01 | Stop reason: HOSPADM

## 2021-01-01 RX ORDER — SCOLOPAMINE TRANSDERMAL SYSTEM 1 MG/1
1 PATCH, EXTENDED RELEASE TRANSDERMAL
Status: DISCONTINUED | OUTPATIENT
Start: 2021-01-01 | End: 2021-01-01 | Stop reason: HOSPADM

## 2021-01-01 RX ORDER — MIRTAZAPINE 15 MG/1
15 TABLET, FILM COATED ORAL NIGHTLY
COMMUNITY

## 2021-01-01 RX ORDER — SODIUM CHLORIDE 9 MG/ML
INJECTION, SOLUTION INTRAVENOUS PRN
Status: DISCONTINUED | OUTPATIENT
Start: 2021-01-01 | End: 2021-01-01 | Stop reason: HOSPADM

## 2021-01-01 RX ORDER — 0.9 % SODIUM CHLORIDE 0.9 %
1000 INTRAVENOUS SOLUTION INTRAVENOUS ONCE
Status: COMPLETED | OUTPATIENT
Start: 2021-01-01 | End: 2021-01-01

## 2021-01-01 RX ORDER — MIDAZOLAM HYDROCHLORIDE 1 MG/ML
INJECTION INTRAMUSCULAR; INTRAVENOUS
Status: COMPLETED | OUTPATIENT
Start: 2021-01-01 | End: 2021-01-01

## 2021-01-01 RX ORDER — HEPARIN SODIUM (PORCINE) LOCK FLUSH IV SOLN 100 UNIT/ML 100 UNIT/ML
100 SOLUTION INTRAVENOUS PRN
Status: DISCONTINUED | OUTPATIENT
Start: 2021-01-01 | End: 2021-01-01 | Stop reason: HOSPADM

## 2021-01-01 RX ORDER — LACTULOSE 10 G/15ML
20 SOLUTION ORAL 2 TIMES DAILY
Status: DISCONTINUED | OUTPATIENT
Start: 2021-01-01 | End: 2021-01-01 | Stop reason: HOSPADM

## 2021-01-01 RX ORDER — LIDOCAINE HYDROCHLORIDE 20 MG/ML
INJECTION, SOLUTION INFILTRATION; PERINEURAL
Status: COMPLETED | OUTPATIENT
Start: 2021-01-01 | End: 2021-01-01

## 2021-01-01 RX ORDER — SUCRALFATE 1 G/1
1 TABLET ORAL 4 TIMES DAILY
Status: DISCONTINUED | OUTPATIENT
Start: 2021-01-01 | End: 2021-01-01 | Stop reason: HOSPADM

## 2021-01-01 RX ORDER — SODIUM CHLORIDE 0.9 % (FLUSH) 0.9 %
10 SYRINGE (ML) INJECTION PRN
Status: DISCONTINUED | OUTPATIENT
Start: 2021-01-01 | End: 2021-01-01 | Stop reason: HOSPADM

## 2021-01-01 RX ORDER — SODIUM CHLORIDE 1000 MG
1 TABLET, SOLUBLE MISCELLANEOUS 2 TIMES DAILY WITH MEALS
Status: DISCONTINUED | OUTPATIENT
Start: 2021-01-01 | End: 2021-01-01 | Stop reason: HOSPADM

## 2021-01-01 RX ORDER — SODIUM CHLORIDE 0.9 % (FLUSH) 0.9 %
5-40 SYRINGE (ML) INJECTION PRN
Status: DISCONTINUED | OUTPATIENT
Start: 2021-01-01 | End: 2021-01-01 | Stop reason: HOSPADM

## 2021-01-01 RX ORDER — FENTANYL CITRATE 50 UG/ML
50 INJECTION, SOLUTION INTRAMUSCULAR; INTRAVENOUS ONCE
Status: COMPLETED | OUTPATIENT
Start: 2021-01-01 | End: 2021-01-01

## 2021-01-01 RX ORDER — HEPARIN SODIUM (PORCINE) LOCK FLUSH IV SOLN 100 UNIT/ML 100 UNIT/ML
500 SOLUTION INTRAVENOUS ONCE
Status: COMPLETED | OUTPATIENT
Start: 2021-01-01 | End: 2021-01-01

## 2021-01-01 RX ORDER — TRAMADOL HYDROCHLORIDE 50 MG/1
50 TABLET ORAL EVERY 8 HOURS
COMMUNITY
End: 2021-01-01 | Stop reason: SDUPTHER

## 2021-01-01 RX ORDER — ALBUMIN (HUMAN) 12.5 G/50ML
75 SOLUTION INTRAVENOUS ONCE
Status: COMPLETED | OUTPATIENT
Start: 2021-01-01 | End: 2021-01-01

## 2021-01-01 RX ORDER — SODIUM CHLORIDE 9 MG/ML
INJECTION, SOLUTION INTRAVENOUS CONTINUOUS
Status: DISCONTINUED | OUTPATIENT
Start: 2021-01-01 | End: 2021-01-01 | Stop reason: HOSPADM

## 2021-01-01 RX ORDER — SODIUM CHLORIDE 0.9 % (FLUSH) 0.9 %
10 SYRINGE (ML) INJECTION ONCE
Status: COMPLETED | OUTPATIENT
Start: 2021-01-01 | End: 2021-01-01

## 2021-01-01 RX ORDER — TRAMADOL HYDROCHLORIDE 50 MG/1
50 TABLET ORAL EVERY 6 HOURS PRN
Status: DISCONTINUED | OUTPATIENT
Start: 2021-01-01 | End: 2021-01-01 | Stop reason: HOSPADM

## 2021-01-01 RX ORDER — 0.9 % SODIUM CHLORIDE 0.9 %
50 INTRAVENOUS SOLUTION INTRAVENOUS ONCE
Status: CANCELLED | OUTPATIENT
Start: 2021-01-01 | End: 2021-01-01

## 2021-01-01 RX ORDER — ALBUMIN (HUMAN) 12.5 G/50ML
25 SOLUTION INTRAVENOUS ONCE
Status: COMPLETED | OUTPATIENT
Start: 2021-01-01 | End: 2021-01-01

## 2021-01-01 RX ORDER — TRAMADOL HYDROCHLORIDE 50 MG/1
50 TABLET ORAL EVERY 8 HOURS
Status: DISCONTINUED | OUTPATIENT
Start: 2021-01-01 | End: 2021-01-01 | Stop reason: HOSPADM

## 2021-01-01 RX ORDER — 0.9 % SODIUM CHLORIDE 0.9 %
50 INTRAVENOUS SOLUTION INTRAVENOUS ONCE
Status: DISCONTINUED | OUTPATIENT
Start: 2021-01-01 | End: 2021-01-01 | Stop reason: HOSPADM

## 2021-01-01 RX ORDER — SODIUM CHLORIDE 1000 MG
1 TABLET, SOLUBLE MISCELLANEOUS 2 TIMES DAILY WITH MEALS
Qty: 14 TABLET | Refills: 0 | Status: ON HOLD | OUTPATIENT
Start: 2021-01-01 | End: 2021-01-01 | Stop reason: HOSPADM

## 2021-01-01 RX ORDER — SODIUM CHLORIDE 0.9 % (FLUSH) 0.9 %
SYRINGE (ML) INJECTION
Status: DISPENSED
Start: 2021-01-01 | End: 2021-01-01

## 2021-01-01 RX ORDER — LORAZEPAM 2 MG/ML
0.5 INJECTION INTRAMUSCULAR EVERY 4 HOURS PRN
Status: DISCONTINUED | OUTPATIENT
Start: 2021-01-01 | End: 2021-01-01 | Stop reason: HOSPADM

## 2021-01-01 RX ORDER — SODIUM CHLORIDE 0.9 % (FLUSH) 0.9 %
5-40 SYRINGE (ML) INJECTION 2 TIMES DAILY
Status: DISCONTINUED | OUTPATIENT
Start: 2021-01-01 | End: 2021-01-01 | Stop reason: HOSPADM

## 2021-01-01 RX ORDER — HEPARIN SODIUM (PORCINE) LOCK FLUSH IV SOLN 100 UNIT/ML 100 UNIT/ML
SOLUTION INTRAVENOUS
Status: DISCONTINUED
Start: 2021-01-01 | End: 2021-01-01 | Stop reason: HOSPADM

## 2021-01-01 RX ORDER — OMEPRAZOLE 20 MG/1
40 CAPSULE, DELAYED RELEASE ORAL DAILY
COMMUNITY

## 2021-01-01 RX ORDER — SODIUM CHLORIDE 0.9 % (FLUSH) 0.9 %
10 SYRINGE (ML) INJECTION EVERY 12 HOURS SCHEDULED
Status: DISCONTINUED | OUTPATIENT
Start: 2021-01-01 | End: 2021-01-01 | Stop reason: HOSPADM

## 2021-01-01 RX ORDER — AMOXICILLIN AND CLAVULANATE POTASSIUM 875; 125 MG/1; MG/1
1 TABLET, FILM COATED ORAL EVERY 12 HOURS
COMMUNITY
Start: 2021-01-01 | End: 2021-01-01

## 2021-01-01 RX ORDER — LACTULOSE 10 G/15ML
20 SOLUTION ORAL 2 TIMES DAILY
Qty: 1800 ML | Refills: 0 | Status: SHIPPED | OUTPATIENT
Start: 2021-01-01 | End: 2021-05-20

## 2021-01-01 RX ADMIN — Medication 10 ML: at 20:25

## 2021-01-01 RX ADMIN — Medication 1 G: at 09:14

## 2021-01-01 RX ADMIN — Medication 1 G: at 17:19

## 2021-01-01 RX ADMIN — LACTULOSE 20 G: 20 SOLUTION ORAL at 22:04

## 2021-01-01 RX ADMIN — LACTULOSE 20 G: 20 SOLUTION ORAL at 20:27

## 2021-01-01 RX ADMIN — ALBUMIN (HUMAN) 25 G: 0.25 INJECTION, SOLUTION INTRAVENOUS at 03:25

## 2021-01-01 RX ADMIN — PANTOPRAZOLE SODIUM 40 MG: 40 TABLET, DELAYED RELEASE ORAL at 06:18

## 2021-01-01 RX ADMIN — TRAMADOL HYDROCHLORIDE 50 MG: 50 TABLET, FILM COATED ORAL at 05:30

## 2021-01-01 RX ADMIN — MIRTAZAPINE 15 MG: 15 TABLET, FILM COATED ORAL at 20:24

## 2021-01-01 RX ADMIN — LACTULOSE 20 G: 20 SOLUTION ORAL at 08:27

## 2021-01-01 RX ADMIN — TBO-FILGRASTIM 300 MCG: 300 INJECTION, SOLUTION SUBCUTANEOUS at 15:58

## 2021-01-01 RX ADMIN — SUCRALFATE 1 G: 1 TABLET ORAL at 13:33

## 2021-01-01 RX ADMIN — TRAMADOL HYDROCHLORIDE 50 MG: 50 TABLET, FILM COATED ORAL at 21:00

## 2021-01-01 RX ADMIN — IOPAMIDOL 90 ML: 755 INJECTION, SOLUTION INTRAVENOUS at 12:57

## 2021-01-01 RX ADMIN — SUCRALFATE 1 G: 1 TABLET ORAL at 16:23

## 2021-01-01 RX ADMIN — FENTANYL CITRATE 50 MCG: 50 INJECTION, SOLUTION INTRAMUSCULAR; INTRAVENOUS at 11:49

## 2021-01-01 RX ADMIN — PANTOPRAZOLE SODIUM 40 MG: 40 TABLET, DELAYED RELEASE ORAL at 06:50

## 2021-01-01 RX ADMIN — Medication 10 ML: at 23:15

## 2021-01-01 RX ADMIN — Medication 300 UNITS: at 07:55

## 2021-01-01 RX ADMIN — MIRTAZAPINE 15 MG: 15 TABLET, FILM COATED ORAL at 20:32

## 2021-01-01 RX ADMIN — LIDOCAINE HYDROCHLORIDE 5 ML: 20 INJECTION, SOLUTION INFILTRATION; PERINEURAL at 09:53

## 2021-01-01 RX ADMIN — TRAMADOL HYDROCHLORIDE 50 MG: 50 TABLET, FILM COATED ORAL at 21:23

## 2021-01-01 RX ADMIN — TRAMADOL HYDROCHLORIDE 50 MG: 50 TABLET, FILM COATED ORAL at 21:41

## 2021-01-01 RX ADMIN — SUCRALFATE 1 G: 1 TABLET ORAL at 16:32

## 2021-01-01 RX ADMIN — Medication 1 G: at 16:59

## 2021-01-01 RX ADMIN — TRAMADOL HYDROCHLORIDE 50 MG: 50 TABLET, FILM COATED ORAL at 05:18

## 2021-01-01 RX ADMIN — LACTULOSE 20 G: 20 SOLUTION ORAL at 08:05

## 2021-01-01 RX ADMIN — ONDANSETRON 4 MG: 2 INJECTION INTRAMUSCULAR; INTRAVENOUS at 10:06

## 2021-01-01 RX ADMIN — DICYCLOMINE HYDROCHLORIDE 20 MG: 20 INJECTION, SOLUTION INTRAMUSCULAR at 10:23

## 2021-01-01 RX ADMIN — SUCRALFATE 1 G: 1 TABLET ORAL at 09:26

## 2021-01-01 RX ADMIN — LACTULOSE 20 G: 20 SOLUTION ORAL at 11:02

## 2021-01-01 RX ADMIN — TRAMADOL HYDROCHLORIDE 50 MG: 50 TABLET ORAL at 20:35

## 2021-01-01 RX ADMIN — SUCRALFATE 1 G: 1 TABLET ORAL at 13:48

## 2021-01-01 RX ADMIN — PANTOPRAZOLE SODIUM 40 MG: 40 TABLET, DELAYED RELEASE ORAL at 05:30

## 2021-01-01 RX ADMIN — LACTULOSE 20 G: 20 SOLUTION ORAL at 22:32

## 2021-01-01 RX ADMIN — MIRTAZAPINE 15 MG: 15 TABLET, FILM COATED ORAL at 22:32

## 2021-01-01 RX ADMIN — MIRTAZAPINE 15 MG: 15 TABLET, FILM COATED ORAL at 22:04

## 2021-01-01 RX ADMIN — ALBUMIN (HUMAN) 25 G: 0.25 INJECTION, SOLUTION INTRAVENOUS at 21:23

## 2021-01-01 RX ADMIN — ALBUMIN (HUMAN) 25 G: 0.25 INJECTION, SOLUTION INTRAVENOUS at 08:42

## 2021-01-01 RX ADMIN — CEFTRIAXONE 1000 MG: 1 INJECTION, POWDER, FOR SOLUTION INTRAMUSCULAR; INTRAVENOUS at 16:32

## 2021-01-01 RX ADMIN — HYDROMORPHONE HYDROCHLORIDE 0.5 MG: 1 INJECTION, SOLUTION INTRAMUSCULAR; INTRAVENOUS; SUBCUTANEOUS at 13:12

## 2021-01-01 RX ADMIN — LACTULOSE 20 G: 20 SOLUTION ORAL at 07:56

## 2021-01-01 RX ADMIN — SUCRALFATE 1 G: 1 TABLET ORAL at 18:43

## 2021-01-01 RX ADMIN — ALBUMIN (HUMAN) 25 G: 0.25 INJECTION, SOLUTION INTRAVENOUS at 20:34

## 2021-01-01 RX ADMIN — TRAMADOL HYDROCHLORIDE 50 MG: 50 TABLET ORAL at 11:18

## 2021-01-01 RX ADMIN — Medication 10 ML: at 20:34

## 2021-01-01 RX ADMIN — LACTULOSE 20 G: 20 SOLUTION ORAL at 21:22

## 2021-01-01 RX ADMIN — SUCRALFATE 1 G: 1 TABLET ORAL at 13:34

## 2021-01-01 RX ADMIN — LACTULOSE 20 G: 20 SOLUTION ORAL at 20:34

## 2021-01-01 RX ADMIN — LACTULOSE 20 G: 20 SOLUTION ORAL at 20:32

## 2021-01-01 RX ADMIN — Medication 10 ML: at 20:32

## 2021-01-01 RX ADMIN — PROPOFOL 220 MG: 10 INJECTION, EMULSION INTRAVENOUS at 14:46

## 2021-01-01 RX ADMIN — Medication 300 UNITS: at 20:35

## 2021-01-01 RX ADMIN — SODIUM CHLORIDE, PRESERVATIVE FREE 10 ML: 5 INJECTION INTRAVENOUS at 08:27

## 2021-01-01 RX ADMIN — ALBUMIN (HUMAN) 25 G: 0.25 INJECTION, SOLUTION INTRAVENOUS at 16:30

## 2021-01-01 RX ADMIN — CEFTRIAXONE 1000 MG: 1 INJECTION, POWDER, FOR SOLUTION INTRAMUSCULAR; INTRAVENOUS at 16:23

## 2021-01-01 RX ADMIN — Medication 10 ML: at 08:09

## 2021-01-01 RX ADMIN — Medication 10 ML: at 08:05

## 2021-01-01 RX ADMIN — ALBUMIN (HUMAN) 50 G: 0.25 INJECTION, SOLUTION INTRAVENOUS at 15:15

## 2021-01-01 RX ADMIN — LACTULOSE 20 G: 20 SOLUTION ORAL at 21:00

## 2021-01-01 RX ADMIN — LACTULOSE 20 G: 20 SOLUTION ORAL at 09:51

## 2021-01-01 RX ADMIN — Medication 300 UNITS: at 22:04

## 2021-01-01 RX ADMIN — LACTULOSE 20 G: 20 SOLUTION ORAL at 10:04

## 2021-01-01 RX ADMIN — SUCRALFATE 1 G: 1 TABLET ORAL at 21:00

## 2021-01-01 RX ADMIN — SODIUM CHLORIDE, PRESERVATIVE FREE 500 UNITS: 5 INJECTION INTRAVENOUS at 15:55

## 2021-01-01 RX ADMIN — SUCRALFATE 1 G: 1 TABLET ORAL at 08:27

## 2021-01-01 RX ADMIN — LACTULOSE 20 G: 20 SOLUTION ORAL at 20:24

## 2021-01-01 RX ADMIN — TRAMADOL HYDROCHLORIDE 50 MG: 50 TABLET, FILM COATED ORAL at 14:01

## 2021-01-01 RX ADMIN — SUCRALFATE 1 G: 1 TABLET ORAL at 18:06

## 2021-01-01 RX ADMIN — Medication 10 ML: at 08:57

## 2021-01-01 RX ADMIN — PANTOPRAZOLE SODIUM 40 MG: 40 TABLET, DELAYED RELEASE ORAL at 05:28

## 2021-01-01 RX ADMIN — MIDAZOLAM 1 MG: 1 INJECTION INTRAMUSCULAR; INTRAVENOUS at 11:49

## 2021-01-01 RX ADMIN — SODIUM CHLORIDE: 9 INJECTION, SOLUTION INTRAVENOUS at 23:17

## 2021-01-01 RX ADMIN — LACTULOSE 20 G: 20 SOLUTION ORAL at 21:16

## 2021-01-01 RX ADMIN — CALCIUM GLUCONATE 1000 MG: 98 INJECTION, SOLUTION INTRAVENOUS at 12:37

## 2021-01-01 RX ADMIN — LIDOCAINE HYDROCHLORIDE 18 ML: 20 INJECTION, SOLUTION INFILTRATION; PERINEURAL at 11:49

## 2021-01-01 RX ADMIN — TRAMADOL HYDROCHLORIDE 50 MG: 50 TABLET, FILM COATED ORAL at 13:33

## 2021-01-01 RX ADMIN — TRAMADOL HYDROCHLORIDE 50 MG: 50 TABLET, FILM COATED ORAL at 21:22

## 2021-01-01 RX ADMIN — TRAMADOL HYDROCHLORIDE 50 MG: 50 TABLET, FILM COATED ORAL at 21:15

## 2021-01-01 RX ADMIN — MIRTAZAPINE 15 MG: 15 TABLET, FILM COATED ORAL at 20:34

## 2021-01-01 RX ADMIN — SUCRALFATE 1 G: 1 TABLET ORAL at 12:41

## 2021-01-01 RX ADMIN — Medication 10 ML: at 12:57

## 2021-01-01 RX ADMIN — Medication 1 G: at 10:22

## 2021-01-01 RX ADMIN — Medication 10 ML: at 08:44

## 2021-01-01 RX ADMIN — Medication 1 G: at 09:51

## 2021-01-01 RX ADMIN — Medication 1 G: at 08:06

## 2021-01-01 RX ADMIN — Medication 1 G: at 11:55

## 2021-01-01 RX ADMIN — SODIUM CHLORIDE, PRESERVATIVE FREE 10 ML: 5 INJECTION INTRAVENOUS at 16:07

## 2021-01-01 RX ADMIN — SUCRALFATE 1 G: 1 TABLET ORAL at 17:19

## 2021-01-01 RX ADMIN — SUCRALFATE 1 G: 1 TABLET ORAL at 10:04

## 2021-01-01 RX ADMIN — Medication 1 G: at 08:43

## 2021-01-01 RX ADMIN — Medication 1 G: at 08:05

## 2021-01-01 RX ADMIN — LACTULOSE 20 G: 20 SOLUTION ORAL at 08:56

## 2021-01-01 RX ADMIN — SUCRALFATE 1 G: 1 TABLET ORAL at 21:41

## 2021-01-01 RX ADMIN — Medication 1 G: at 16:40

## 2021-01-01 RX ADMIN — LACTULOSE 20 G: 20 SOLUTION ORAL at 09:26

## 2021-01-01 RX ADMIN — Medication 1 G: at 16:08

## 2021-01-01 RX ADMIN — TRAMADOL HYDROCHLORIDE 50 MG: 50 TABLET, FILM COATED ORAL at 05:33

## 2021-01-01 RX ADMIN — LACTULOSE 20 G: 20 SOLUTION ORAL at 08:44

## 2021-01-01 RX ADMIN — SUCRALFATE 1 G: 1 TABLET ORAL at 14:00

## 2021-01-01 RX ADMIN — IOPAMIDOL 75 ML: 755 INJECTION, SOLUTION INTRAVENOUS at 18:06

## 2021-01-01 RX ADMIN — MIRTAZAPINE 15 MG: 15 TABLET, FILM COATED ORAL at 20:27

## 2021-01-01 RX ADMIN — SODIUM CHLORIDE, PRESERVATIVE FREE 10 ML: 5 INJECTION INTRAVENOUS at 15:55

## 2021-01-01 RX ADMIN — Medication 10 ML: at 07:57

## 2021-01-01 RX ADMIN — ALBUMIN (HUMAN) 25 G: 0.25 INJECTION, SOLUTION INTRAVENOUS at 08:27

## 2021-01-01 RX ADMIN — PANTOPRAZOLE SODIUM 40 MG: 40 TABLET, DELAYED RELEASE ORAL at 05:19

## 2021-01-01 RX ADMIN — TRAMADOL HYDROCHLORIDE 50 MG: 50 TABLET, FILM COATED ORAL at 13:48

## 2021-01-01 RX ADMIN — SODIUM CHLORIDE: 9 INJECTION, SOLUTION INTRAVENOUS at 22:33

## 2021-01-01 RX ADMIN — SODIUM CHLORIDE 500 ML: 9 INJECTION, SOLUTION INTRAVENOUS at 11:55

## 2021-01-01 RX ADMIN — SUCRALFATE 1 G: 1 TABLET ORAL at 21:22

## 2021-01-01 RX ADMIN — Medication 300 UNITS: at 08:57

## 2021-01-01 RX ADMIN — TRAMADOL HYDROCHLORIDE 50 MG: 50 TABLET, FILM COATED ORAL at 12:42

## 2021-01-01 RX ADMIN — Medication 10 ML: at 20:35

## 2021-01-01 RX ADMIN — LACTULOSE 20 G: 20 SOLUTION ORAL at 07:55

## 2021-01-01 RX ADMIN — TRAMADOL HYDROCHLORIDE 50 MG: 50 TABLET, FILM COATED ORAL at 04:58

## 2021-01-01 RX ADMIN — Medication 10 ML: at 07:54

## 2021-01-01 RX ADMIN — ALBUMIN (HUMAN) 25 G: 0.25 INJECTION, SOLUTION INTRAVENOUS at 18:31

## 2021-01-01 RX ADMIN — TRAMADOL HYDROCHLORIDE 50 MG: 50 TABLET, FILM COATED ORAL at 06:00

## 2021-01-01 RX ADMIN — SODIUM CHLORIDE, PRESERVATIVE FREE 10 ML: 5 INJECTION INTRAVENOUS at 16:33

## 2021-01-01 RX ADMIN — SODIUM CHLORIDE, PRESERVATIVE FREE 10 ML: 5 INJECTION INTRAVENOUS at 10:07

## 2021-01-01 RX ADMIN — Medication 1 G: at 07:56

## 2021-01-01 RX ADMIN — ONDANSETRON 4 MG: 2 INJECTION INTRAMUSCULAR; INTRAVENOUS at 11:49

## 2021-01-01 RX ADMIN — Medication 300 UNITS: at 08:42

## 2021-01-01 RX ADMIN — CEFTRIAXONE 1000 MG: 1 INJECTION, POWDER, FOR SOLUTION INTRAMUSCULAR; INTRAVENOUS at 15:58

## 2021-01-01 RX ADMIN — Medication 1 G: at 11:01

## 2021-01-01 RX ADMIN — HYDROMORPHONE HYDROCHLORIDE 0.5 MG: 1 INJECTION, SOLUTION INTRAMUSCULAR; INTRAVENOUS; SUBCUTANEOUS at 15:33

## 2021-01-01 RX ADMIN — Medication 10 ML: at 22:32

## 2021-01-01 RX ADMIN — SUCRALFATE 1 G: 1 TABLET ORAL at 19:44

## 2021-01-01 RX ADMIN — CEFTRIAXONE 1000 MG: 1 INJECTION, POWDER, FOR SOLUTION INTRAMUSCULAR; INTRAVENOUS at 17:19

## 2021-01-01 RX ADMIN — DOCUSATE SODIUM 50 MG AND SENNOSIDES 8.6 MG 2 TABLET: 8.6; 5 TABLET, FILM COATED ORAL at 16:49

## 2021-01-01 RX ADMIN — SUCRALFATE 1 G: 1 TABLET ORAL at 09:51

## 2021-01-01 RX ADMIN — Medication 1 G: at 19:44

## 2021-01-01 RX ADMIN — LACTULOSE 20 G: 20 SOLUTION ORAL at 19:44

## 2021-01-01 RX ADMIN — LACTULOSE 20 G: 20 SOLUTION ORAL at 08:06

## 2021-01-01 RX ADMIN — SODIUM CHLORIDE: 9 INJECTION, SOLUTION INTRAVENOUS at 14:42

## 2021-01-01 RX ADMIN — Medication 300 UNITS: at 16:07

## 2021-01-01 RX ADMIN — Medication 1 G: at 18:34

## 2021-01-01 RX ADMIN — PANTOPRAZOLE SODIUM 40 MG: 40 TABLET, DELAYED RELEASE ORAL at 05:29

## 2021-01-01 RX ADMIN — FENTANYL CITRATE 50 MCG: 50 INJECTION, SOLUTION INTRAMUSCULAR; INTRAVENOUS at 16:22

## 2021-01-01 RX ADMIN — SUCRALFATE 1 G: 1 TABLET ORAL at 21:16

## 2021-01-01 RX ADMIN — ALBUMIN (HUMAN) 75 G: 0.25 INJECTION, SOLUTION INTRAVENOUS at 14:40

## 2021-01-01 RX ADMIN — SODIUM CHLORIDE 1000 ML: 9 INJECTION, SOLUTION INTRAVENOUS at 16:50

## 2021-01-01 RX ADMIN — PANTOPRAZOLE SODIUM 40 MG: 40 TABLET, DELAYED RELEASE ORAL at 05:44

## 2021-01-01 RX ADMIN — TRAMADOL HYDROCHLORIDE 50 MG: 50 TABLET, FILM COATED ORAL at 13:34

## 2021-01-01 RX ADMIN — Medication 1 G: at 16:22

## 2021-01-01 RX ADMIN — Medication 1 G: at 07:55

## 2021-01-01 RX ADMIN — LIDOCAINE HYDROCHLORIDE 4 ML: 20 INJECTION, SOLUTION INFILTRATION; PERINEURAL at 09:32

## 2021-01-01 ASSESSMENT — PAIN DESCRIPTION - ORIENTATION: ORIENTATION: MID

## 2021-01-01 ASSESSMENT — PAIN DESCRIPTION - PAIN TYPE
TYPE: ACUTE PAIN;CHRONIC PAIN
TYPE: ACUTE PAIN

## 2021-01-01 ASSESSMENT — ENCOUNTER SYMPTOMS
EYE PAIN: 0
EYE DISCHARGE: 0
VOMITING: 0
BACK PAIN: 0
EYE REDNESS: 0
SORE THROAT: 0
DIARRHEA: 0
COUGH: 0
WHEEZING: 0
NAUSEA: 0
COLOR CHANGE: 1
ABDOMINAL DISTENTION: 1
ABDOMINAL PAIN: 1
SHORTNESS OF BREATH: 0
SINUS PRESSURE: 0

## 2021-01-01 ASSESSMENT — PAIN SCALES - GENERAL
PAINLEVEL_OUTOF10: 7
PAINLEVEL_OUTOF10: 0
PAINLEVEL_OUTOF10: 0
PAINLEVEL_OUTOF10: 3
PAINLEVEL_OUTOF10: 3
PAINLEVEL_OUTOF10: 0
PAINLEVEL_OUTOF10: 8
PAINLEVEL_OUTOF10: 0
PAINLEVEL_OUTOF10: 4
PAINLEVEL_OUTOF10: 6
PAINLEVEL_OUTOF10: 0
PAINLEVEL_OUTOF10: 5
PAINLEVEL_OUTOF10: 0
PAINLEVEL_OUTOF10: 3

## 2021-01-01 ASSESSMENT — PAIN DESCRIPTION - DESCRIPTORS
DESCRIPTORS: CRAMPING;THROBBING
DESCRIPTORS: ACHING;DISCOMFORT;SORE
DESCRIPTORS: PATIENT UNABLE TO DESCRIBE
DESCRIPTORS: ACHING
DESCRIPTORS: ACHING;SORE;DISCOMFORT

## 2021-01-01 ASSESSMENT — PAIN - FUNCTIONAL ASSESSMENT
PAIN_FUNCTIONAL_ASSESSMENT: 0-10
PAIN_FUNCTIONAL_ASSESSMENT: ACTIVITIES ARE NOT PREVENTED
PAIN_FUNCTIONAL_ASSESSMENT: ACTIVITIES ARE NOT PREVENTED
PAIN_FUNCTIONAL_ASSESSMENT: PREVENTS OR INTERFERES SOME ACTIVE ACTIVITIES AND ADLS

## 2021-01-01 ASSESSMENT — PAIN DESCRIPTION - LOCATION
LOCATION: ABDOMEN

## 2021-01-01 ASSESSMENT — PAIN DESCRIPTION - FREQUENCY
FREQUENCY: INTERMITTENT

## 2021-01-08 NOTE — PROGRESS NOTES
Hepatobiliary and Pancreatic Surgery Attending History and Physical    Patient's Name/Date of Birth: Ashanti Rucker /1955 (28 y.o.)    Date: January 8, 2021     CC: hepatocellular carcinoma    HPI:  Patient is a very pleasant 72year old male whom has had hepatitis C cirrhosis. He is a patient of Dr. Christal Arshad whom has been undergoing esophageal banding for the past 5 - 6 years. He has never had a paracentesis nor hepatic encephalopathy. He does drink 4 beers daily. He had imaging performed his last hospital admission which showed a 6cm segment 6 hepatocellular carcinoma - biopsy proven. His platelet count is 28W and he does have low volume ascites. His MELD Na = 8 and he's a Child Haile A (6). He denies any weight loss or abdominal pain. His AFP is 65k. He had a triphasic CT scan of his liver which showed that the tumor was actually 9cm in diameter with new onset ascites. He also had a CT chest which showed two new pulmonary nodules. He had his y90 a few weeks ago. He denies any leg swelling but does have abdominal distention. He states that it is uncomfortable but is not having trouble breathing. Physical Exam:  Vitals:    01/08/21 0957   BP: 128/67   Pulse: 93   Resp: 18   Temp: 97.7 °F (36.5 °C)   SpO2: 100%       PSYCH: mood and affect normal, alert and oriented x 3  CONSTITUTIONAL: No apparent distress, comfortable  EYES: Sclera white, pupils equal round and reactive to light  ENMT:  Hearing normal, trachea midline, ears externally intact  LYMPH: no lympadenopathy in neck. Nolympadenopathy in groins  RESP: Breath sounds were clear and equal with no rales, wheezes, or rhonchi. Respiratory effort was normal with no retractions or use of accessory muscles. CV: Heart soundswere normal with a regular rate and rhythm.    No pedal edema  GI/ Abdomen: Soft, nondistended, nontender, no guarding, no peritoneal signs, positive fluid wave  MSK: no clubbing/ no cyanosis/ gaitnormal Assessment/Plan:  Hepatocellular carcinoma (9cm) segment 6 without portal vein invasion, Child's A (6)/B(7), MELD NA = 8, portal hypertension with esophageal varices  - Yossef with EGD for varices on 1/18  - US paracentesis - worsening ascites y90    20 Minutes of which greater than 50% was spent counseling or coordinating his care. Thank you for the consultation allowing me to take part in Mr. Ivy Rodriges' care. Please send a copy of my note to Dr. Wellington Dee.  Lizeth Fernandez M.D.  1/8/2021  10:03 AM

## 2021-01-13 NOTE — ED PROVIDER NOTES
ED Attending  [facility]  Department of Emergency Medicine   ED  Encounter Note  Admit Date/RoomTime: 2021  3:40 PM  ED Room:     NAME: Simone Salmeron  : 1955  MRN: [id number]     Chief Complaint:  Abdominal Pain (states he needs his first paracentesis)    History of Present Illness        Simone Salmeron is a 72 y.o. old male who presents to the emergency department by private vehicle, for gradual onset distention pain in the entire abdomen without radiation which began a few week(s) prior to arrival.  There has been no similar episodes in the past.  Patient currently receiving treatment by Dr. Ravi Sims and the [facility] for liver cancer   since onset the symptoms have been worsening. The pain is associated with nothing pertinent. The pain is aggravated by nothing in particular and relieved by nothing. There has been NO back pain, chills, cloudy urine, constipation, diarrhea, dysuria, headache, hematuria, penile discharge, sweating, urinary frequency, urinary incontinence, urinary urgency or vomiting. Delsa Severance ROS   Pertinent positives and negatives are stated within HPI, all other systems reviewed and are negative. Past Medical History:  has a past medical history of Cirrhosis ([facility] [address] Esophageal varices ([address] GI bleed, Hepatitis C, Hepatocellular carcinoma ([facility] [address] and Hypertension. Surgical History:  has a past surgical history that includes Leg Surgery; Endoscopy, colon, diagnostic (2013); Upper gastrointestinal endoscopy (2016); Upper gastrointestinal endoscopy (2018); Upper gastrointestinal endoscopy (N/A, 3/27/2018); Upper gastrointestinal endoscopy (N/A, 10/14/2020); CT NEEDLE BIOPSY LIVER PERCUTANEOUS (10/15/2020); IR EMBOLIZATION TUMOR/ORGAN ISCH/INFARC (2020); IR PORT PLACEMENT > 5 YEARS (2020); and IR EMBOLIZATION TUMOR/ORGAN ISCH/INFARC (2020). Social History:  reports that he has never smoked.  He has never used smokeless tobacco. He reports previous alcohol use. He reports that he does not use drugs. Family History: family history is not on file. Allergies: Patient has no known allergies. Physical Exam   Oxygen Saturation Interpretation: Normal.        ED Triage Vitals [01/13/21 1538]   BP Temp Temp src Pulse Resp SpO2 Height Weight   (!) 141/70 98.1 °F (36.7 °C) -- 121 18 100 % -- --         General Appearance/Constitutional:  Alert, development consistent with age. HEENT:  NC/NT. PERRLA. Airway patent. Neck:  Supple. No lymphadenopathy. Respiratory: Lungs Clear to auscultation and breath sounds equal.  CV:  Regular rate and rhythm. GI:  distended. Bowel sounds: normal bowel sounds. Tenderness: There is moderate tenderness present - located diffusely in the entire abdomen., Abdominal distension is present - located diffusely in the entire abdomen. Liver: tender. Spleen:  non-tender. Back: CVA Tenderness: No.  Integument:  Normal turgor. Warm, dry, without visible rash, unless noted elsewhere. Neurological:  Orientation age-appropriate. Motor functions intact.     Lab / Imaging Results   (All laboratory and radiology results have been personally reviewed by myself)  Labs:  Results for orders placed or performed during the hospital encounter of 01/13/21   CBC Auto Differential   Result Value Ref Range    WBC 1.8 (L) 4.5 - 11.5 E9/L    RBC 3.39 (L) 3.80 - 5.80 E12/L    Hemoglobin 8.8 (L) 12.5 - 16.5 g/dL    Hematocrit 30.1 (L) 37.0 - 54.0 %    MCV 88.8 80.0 - 99.9 fL    MCH 26.0 26.0 - 35.0 pg    MCHC 29.2 (L) 32.0 - 34.5 %    RDW 23.9 (H) 11.5 - 15.0 fL    Platelets 54 (L) 518 - 450 E9/L    MPV 10.9 7.0 - 12.0 fL   Comprehensive Metabolic Panel w/ Reflex to MG   Result Value Ref Range    Sodium 136 132 - 146 mmol/L    Potassium reflex Magnesium 4.0 3.5 - 5.0 mmol/L    Chloride 105 98 - 107 mmol/L    CO2 25 22 - 29 mmol/L    Anion Gap 6 (L) 7 - 16 mmol/L    Glucose 103 (H) 74 - 99 mg/dL    BUN 9 8 - 23 mg/dL    CREATININE 0.8 0.7 - 1.2 mg/dL    GFR Non-African American >60 >=60 mL/min/1.73    GFR African American >60     Calcium 8.6 8.6 - 10.2 mg/dL    Total Protein 6.0 (L) 6.4 - 8.3 g/dL    Alb 2.6 (L) 3.5 - 5.2 g/dL    Total Bilirubin 1.0 0.0 - 1.2 mg/dL    Alkaline Phosphatase 81 40 - 129 U/L    ALT 10 0 - 40 U/L    AST 28 0 - 39 U/L   Lipase   Result Value Ref Range    Lipase 35 13 - 60 U/L   Amylase   Result Value Ref Range    Amylase 103 (H) 20 - 100 U/L   Protime-INR   Result Value Ref Range    Protime 14.9 (H) 9.3 - 12.4 sec    INR 1.3    APTT   Result Value Ref Range    aPTT 35.6 (H) 24.5 - 35.1 sec   Body fluid cell count with differential   Result Value Ref Range    Cell Count Fluid Type Ascites     Color, Fluid Red     Appearance, Fluid Cloudy     Nucl Cell, Fluid 260 /uL    RBC, Fluid 11,000 /uL    Neutrophil Count, Fluid 64 %    Monocyte Count, Fluid 36 %   Amylase, body fluid   Result Value Ref Range    Fluid Type Ascites      Imaging: All Radiology results interpreted by Radiologist unless otherwise noted. No orders to display       ED Course / Medical Decision Making     Medications   albumin human 25 % IV solution 25 g (25 g Intravenous New Bag 1/13/21 1831)   fentaNYL (SUBLIMAZE) injection 50 mcg (50 mcg Intravenous Given 1/13/21 1622)        Re-Evaluations:  1/13/21      Time: 1311- Surgical resident at bedside. [id number] post paracentesis. Patient states he has no pain. Abd is soft and non- tender at this time. Patients condition has improved and is currently asymptomatic. Consultations:             IP CONSULT TO GENERAL SURGERY  1601- Spoke with Dr. Meghana Valerio he states he wanted and abdominal US with a paracentesis. 46- Spoke with Dr. Meghana Valerio regarding IR has already left at 3pm and unable to have paracentesis tonight.  He states to call the surgical resident to have them complete the procedure to be discharged tonight instead of being admitted. Procedures:     PROCEDURE NOTE  1/13/21       Time: 1700    PARACENTESIS   Risks, benefits and alternatives (for applicable procedures below) described. Performed By: General Surgeon: David Cook MD and [address] [place] [address] . Indication: Diagnostic and Therapeutic Ascites. Informed consent: The patient provided verbal consent for this procedure. .  Prep:  The skin was cleansed with hexachlorophene and draped in a sterile fashion. Anesthetic: The paracentesis needle insertion area was anesthetized with Lidocaine 1% without epinephrine. Paracentesis:  The patient was positioned in right lateral decubitus position and the landmarks were identified. A paracentesis catheter was then advanced into the abdominal cavity over a needle and the needle was withdrawn and fluid returned which was serosanguinous. A total volume of 2400 cc was withdrawn which was sent to the lab for appropriate testing was drained. A sterile dressing was then applied to the site. Complications: None  The patient tolerated the procedure well. MDM:  Patient presents to the ED for abdominal distention due to unable to have paracentesis and ultrasound this morning due to not fasting. Differential diagnoses included but not limited to ascites versus abdominal pain. Workup in the ED revealed CBC there was a white count of 1.8 and hemoglobin 8.8. Hemoglobin is stable for patient. Patient is currently being treated for liver cancer. CMP was revealed a low albumin of 2.6. Patient received infusion of this while in the ER. Lipase was normal at 35. Amylase was elevated at 103. INR was 1.3. APTT was 35.6. Surgical resident and ER attending performed a paracentesis of the abdomen and received 2400 cc total volume. After the paracentesis patient had abdomen with that was soft and nontender. He had no pain after this procedure. Patient continues to be non-toxic on re-evaluation. Findings were discussed with the patient and reasons to immediately return to the ED were articulated to them. They will follow-up with their PMD and general surgeon that he follows with. Plan of Care/Counseling:  I reviewed today's visit with the patient in addition to providing specific details for the plan of care and counseling regarding the diagnosis and prognosis. Questions are answered at this time and are agreeable with the plan. Assessment      1. Other ascites      This patient's ED course included: a personal history and physicial examination, re-evaluation prior to disposition, multiple bedside re-evaluations and IV medications  This patient has remained hemodynamically stable during their ED course. Plan   Discharged home  Patient condition is stable. New Medications     New Prescriptions    No medications on file     Electronically signed by JUAN ALBERTO Scott CNP   DD: 1/13/21  **This report was transcribed using voice recognition software. Every effort was made to ensure accuracy; however, inadvertent computerized transcription errors may be present.   END OF PROVIDER NOTE     JUAN ALBERTO Scott CNP  01/13/21 1540

## 2021-01-13 NOTE — CONSULTS
[facility] SURGERY  CONSULT NOTE            Date: 1/13/2021        Patient Name: Talya Duval     YOB: 1955      Age:  72 y.o. Consult by: Dr Woody Simental to: Dr Marta Dumas  Reason:  Abdominal distention    Chief Complaint     Chief Complaint   Patient presents with    Abdominal Pain     states he needs his first paracentesis     History Obtained From   patient, electronic medical record    History of Present Illness   Talya Duval is a 72 y.o. male with hep C cirrhosis and 9cm [address] s/p Y90 on 12/16/20 (4 weeks ago) who presents for his first ever paracentesis. He feels very uncomfortable/bloated without outright pain. Can't bend over to even tie his shoes. Otherwise been tolerating diet without nausea/vomiting. Denies dyspnea. No fever/chills/sweats or other ill symptoms. Last drank a wine cooler a few days ago but otherwise hasn't had any beer or other alcohol for several weeks.     Past Medical History     Past Medical History:   Diagnosis Date    Cirrhosis ([address]     Esophageal varices ([address]     GI bleed 10/2020    Hepatitis C     Hepatocellular carcinoma ([address]     Hypertension         Past Surgical History     Past Surgical History:   Procedure Laterality Date    CT NEEDLE BIOPSY LIVER PERCUTANEOUS  10/15/2020    CT NEEDLE BIOPSY LIVER PERCUTANEOUS 10/15/2020 [facility]    ENDOSCOPY, COLON, DIAGNOSTIC  4/30/2013    IR EMBOLIZATION TUMOR / ORGAN  12/14/2020    IR EMBOLIZATION TUMOR / ORGAN 12/14/2020 Karen Snell MD [facility]    IR EMBOLIZATION TUMOR / ORGAN  12/16/2020    IR EMBOLIZATION TUMOR / ORGAN 12/16/2020 Karen Snell MD [facility]    IR PORT PLACEMENT EQUAL OR GREATER THAN 5 YEARS  12/14/2020    IR PORT PLACEMENT EQUAL OR GREATER THAN 5 YEARS 12/14/2020 Karen Snell MD [facility]    LEG SURGERY      right leg    UPPER GASTROINTESTINAL ENDOSCOPY  09/02/2016    Clemente Peña, nonbleeding esophageal varicies and gastritis    UPPER GASTROINTESTINAL ENDOSCOPY  03/27/2018    UPPER GASTROINTESTINAL ENDOSCOPY N/A 3/27/2018    EGD BAND LIGATION performed by Trever Clements MD at [address] [facility] [address] N/A 10/14/2020    EGD CONTROL HEMORRHAGE performed by Cornelius Salomon MD at [facility] ENDOSCOPY        Medications - Prior to Admission and Current Meds     Prior to Admission medications    Medication Sig Start Date End Date Taking? Authorizing Provider   ondansetron (ZOFRAN) 8 MG tablet Take 8 mg by mouth every 8 hours as needed for Nausea or Vomiting    Historical Provider, MD   dicyclomine (BENTYL) 10 MG capsule Take 10 mg by mouth 2 times daily    Historical Provider, MD   pantoprazole (PROTONIX) 40 MG tablet Take 40 mg by mouth daily    Historical Provider, MD   sucralfate (CARAFATE) 1 GM tablet Take 1 tablet by mouth 4 times daily 10/17/20   Silverio Parents, DO        Inpatient:      albumin human 25 % IV solution 25 g, Once        Allergies   Patient has no known allergies. Social History     Social History     Tobacco History     Smoking Status  Never Smoker    Smokeless Tobacco Use  Never Used          Alcohol History     Alcohol Use Status  Not Currently Comment  at least a 6 pack daily          Drug Use     Drug Use Status  No          Sexual Activity     Sexually Active  Not Asked                Family History   History reviewed. No pertinent family history. Review of Systems   Pertinent ROS listed in HPI, all others negative    Physical Exam   /73   Pulse 109   Temp 98.1 °F (36.7 °C)   Resp 16   SpO2 100%     GENERAL:  No acute distress. Alert and oriented. HEAD:  Normocephalic, atraumatic. EYES:  No scleral icterus. Conjugate gaze. ENT/NECK:  Trachea midline, mucous membranes moist.   LUNGS:  No cough. Nonlabored breathing on room air. CARDIOVASC:  Tachycardic rate, no cyanosis. ABDOMEN:  Very full, distended, uncomfortable but non-tender. No guarding / rigidity / rebound.    LIMBS: No deformities, no edema. NEURO:  Face symmetric, moves all extremities  SKIN:  Warm, dry. Labs    CBC  Recent Labs     01/13/21  1555   WBC 1.8*   HGB 8.8*   HCT 30.1*   PLT 54*     BMP  Recent Labs     01/13/21  1555      K 4.0      CO2 25   BUN 9   CREATININE 0.8   CALCIUM 8.6     Liver Function  Recent Labs     01/13/21  1555   AMYLASE 103*   LIPASE 35   BILITOT 1.0   AST 28   ALT 10   ALKPHOS 81   PROT 6.0*   LABALBU 2.6*     No results for input(s): LACTATE in the last 72 hours. Recent Labs     01/13/21  1555   INR 1.3       Imaging/Diagnostics Last 24 Hours   No results found. Assessment        72 y.o. male with cirrhotic ascites in the setting of HCC s/p Y90 4 wks ago. MELD-Na score 12. Plan   - bedside paracentesis resulted in 2400mL serosanguinous fluid  - ordered paracentesis labs  - ordered 25g of 25% albumin x1  - continue previously scheduled routine followups    Plan was discussed with Dr. Drew Silva.     Electronically signed by Indra Montiel MD on 1/13/21 at 5:23 PM EST

## 2021-01-13 NOTE — ED NOTES
3 large bodily fluid specimen jars walked to lab with labels and req.       Alban Gil RN  01/13/21 2529

## 2021-01-13 NOTE — PROCEDURES
Ignacio Pastrana is a 72 y.o. male patient. No diagnosis found. Past Medical History:   Diagnosis Date    Cirrhosis ([address]     Esophageal varices (HCC)     GI bleed 10/2020    Hepatitis C     Hepatocellular carcinoma (HCC)     Hypertension      Blood pressure 119/73, pulse 109, temperature 98.1 °F (36.7 °C), resp. rate 16, SpO2 100 %. Paracentesis    Date/Time: 1/13/2021 5:32 PM  Performed by: Seamus Gavin MD  Authorized by: Seamus Gavin MD     Consent:     Consent obtained:  Verbal    Consent given by:  Patient    Risks discussed:  Bleeding and bowel perforation  Pre-procedure details:     Procedure purpose:  Therapeutic    Preparation: Patient was prepped and draped in usual sterile fashion    Anesthesia (see MAR for exact dosages): Anesthesia method:  Local infiltration    Local anesthetic:  Lidocaine 1% w/o epi  Procedure details:     Needle gauge:  18    Ultrasound guidance: yes      Puncture site: RUQ. Fluid removed amount:  2400mL    Fluid appearance:  Serosanguinous    Dressing:  Adhesive bandage  Post-procedure details:     Patient tolerance of procedure:   Tolerated well, no immediate complications      Performed under Dr Erin Llanes MD  1/13/2021

## 2021-01-13 NOTE — PROGRESS NOTES
Have you been tested for COVID 1/13/20 pending         Have you been told you were positive for COVID No  Have you had any known exposure to someone that is positive for COVID No  Do you have a cough                   No              Do you have shortness of breath No                 Do you have a sore throat            No                Are you having chills                    No                Are you having muscle aches. No                    Please come to the hospital wearing a mask and have your significant other wear a mask as well. Both of you should check your temperature before leaving to come here,  if it is 100 or higher please call [phone] for instruction. *pt does not have thermometer*      [facility] PRE-ADMISSION TESTING INSTRUCTIONS      ARRIVAL INSTRUCTIONS:  [x] Parking the day of Surgery is located in the Main Entrance lot. Upon entering the door, someone will be there to greet you    [x] Bring photo ID and insurance card    [x] Please be sure to arrange for responsible adult to provide transportation to and from the hospital    [x] Please arrange for responsible adult to be with you for the 24 hour period post procedure due to having anesthesia      GENERAL INSTRUCTIONS:    [x] Nothing by mouth after midnight, including gum, candy, mints or water    [x] You may brush your teeth, but do not swallow any water    [x] Take medications as instructed with 1-2 oz of water    [x] No alcohol or illegal drug use within 24 hours of surgery.     [x] Jewelry, body piercing's, eyeglasses, contact lenses and dentures are not permitted into surgery (bring cases)    [x] You can expect a call the business day prior to procedure to notify you if your arrival time changes    [x] If you receive a survey after surgery we would greatly appreciate your comments [x] Please notify surgeon if you develop any illness between now and time of surgery (cold, cough, sore throat, fever, nausea, vomiting) or any signs of infections  including skin, wounds, and dental.

## 2021-01-15 NOTE — TELEPHONE ENCOUNTER
Patient called in stating he no longer needed the US for eval for paracentesis since he went to ER on 1/13/21 and they drained him there. I called IR and left message for Jose Bowers that we no longer needed the paracentesis.       Electronically signed by Belinda Louis RN on 1/15/2021 at 3:37 PM

## 2021-01-15 NOTE — PROGRESS NOTES
Patient history includes Hep C, cirrhosis, h/o: esophageal banding, patient scheduled for EGD possible esophageal banding with Dr. Yanira Delong 1-18-21. Above as well as most recent labs 1-13-21 CBC H/H 8.8/30.1 and platelets 54, and patient medications reviewed with Dr. Edward Michel- labs faxed to Dr. Angela Bolton office for review, no other needs at this time.

## 2021-01-18 NOTE — BRIEF OP NOTE
Brief Postoperative Note    Danay Crawford  YOB: 1955  [id number]    Procedure: EGD with biopsy    Anesthesia: [facility]    Surgeon:  Fito Gonzalez MD    Findings:       Esophagus:  GERD.  ONE LARGE 4/4 VARIX BANDED WITH 2 BANDS      Stomach:  Gastritis , NO GASTRIC VARICES    Duodenum:  Normal          Complications: None      Estimated blood loss: none      Yvette Camacho MD

## 2021-01-18 NOTE — ANESTHESIA PRE PROCEDURE
 GI bleed 10/2020    Hepatitis C     [facility] [address]     Hypertension        Past Surgical History:        Procedure Laterality Date    CT NEEDLE BIOPSY LIVER PERCUTANEOUS  10/15/2020    CT NEEDLE BIOPSY LIVER PERCUTANEOUS 10/15/2020 [facility]    ENDOSCOPY, COLON, DIAGNOSTIC  4/30/2013    IR EMBOLIZATION TUMOR / ORGAN  12/14/2020    IR EMBOLIZATION TUMOR / ORGAN 12/14/2020 [facility]    IR EMBOLIZATION TUMOR / ORGAN  12/16/2020    IR EMBOLIZATION TUMOR / ORGAN 12/16/2020 [facility]    IR PORT PLACEMENT EQUAL OR GREATER THAN 5 YEARS  12/14/2020    IR PORT PLACEMENT EQUAL OR GREATER THAN 5 YEARS 12/14/2020 [facility]    LEG SURGERY      right leg    UPPER GASTROINTESTINAL ENDOSCOPY  09/02/2016    Jamie Crea, nonbleeding esophageal varicies and gastritis    UPPER GASTROINTESTINAL ENDOSCOPY  03/27/2018    UPPER GASTROINTESTINAL ENDOSCOPY N/A 3/27/2018    EGD BAND LIGATION performed by Trever Clements MD at [address] [facility] N/A 10/14/2020    EGD CONTROL HEMORRHAGE performed by Cornelius Salomon MD at [address] [place] [address] History:    Social History     Tobacco Use    Smoking status: Never Smoker    Smokeless tobacco: Never Used   Substance Use Topics    Alcohol use: Not Currently     Frequency: 4 or more times a week     Drinks per session: 3 or 4     Binge frequency: Daily or almost daily     Comment: at least a 6 pack daily                                Counseling given: Not Answered      Vital Signs (Current):   Vitals:    01/13/21 1439 01/18/21 1414   BP:  136/71   Pulse:  79   Resp:  16   Temp:  96.9 °F (36.1 °C)   TempSrc:  Temporal   SpO2:  100%   Weight: 185 lb (83.9 kg)    Height: 6' (1.829 m)                                               BP Readings from Last 3 Encounters:   01/18/21 136/71   01/13/21 118/68   01/08/21 128/67 NPO Status:                        Time of last solid consumption: 2000                        Date of last liquid consumption: 01/17/21                        Date of last solid food consumption: 01/17/21    BMI:   Wt Readings from Last 3 Encounters:   01/13/21 185 lb (83.9 kg)   01/08/21 185 lb (83.9 kg)   12/16/20 187 lb (84.8 kg)     Body mass index is 25.09 kg/m². CBC:   Lab Results   Component Value Date    WBC 1.8 01/13/2021    RBC 3.39 01/13/2021    RBC  09/01/2016      RBC           [id number]    transfused   09/02/16  00:19  SCC    RBC  09/01/2016      RBC           [id number]    transfused   09/02/16  19:47  SCC    HGB 8.8 01/13/2021    HCT 30.1 01/13/2021    MCV 88.8 01/13/2021    RDW 23.9 01/13/2021    PLT 54 01/13/2021       CMP:   Lab Results   Component Value Date     01/13/2021    K 4.0 01/13/2021     01/13/2021    CO2 25 01/13/2021    BUN 9 01/13/2021    CREATININE 0.8 01/13/2021    GFRAA >60 01/13/2021    LABGLOM >60 01/13/2021    GLUCOSE 103 01/13/2021    PROT 6.0 01/13/2021    CALCIUM 8.6 01/13/2021    BILITOT 1.0 01/13/2021    ALKPHOS 81 01/13/2021    AST 28 01/13/2021    ALT 10 01/13/2021       POC Tests: No results for input(s): POCGLU, POCNA, POCK, POCCL, POCBUN, POCHEMO, POCHCT in the last 72 hours.     Coags:   Lab Results   Component Value Date    PROTIME 14.9 01/13/2021    INR 1.3 01/13/2021    APTT 35.6 01/13/2021       HCG (If Applicable): No results found for: PREGTESTUR, PREGSERUM, HCG, HCGQUANT     ABGs: No results found for: PHART, PO2ART, BBE3OPS, HJA1EWA, BEART, X5HCUBVI     Type & Screen (If Applicable):  No results found for: LABABO, LABRH    Drug/Infectious Status (If Applicable):  No results found for: HIV, HEPCAB    COVID-19 Screening (If Applicable):   Lab Results   Component Value Date    COVID19 Not Detected 01/13/2021     EKG 10/13/2020  Sinus tachycardia, R 103  Otherwise normal ECG  When compared with ECG of 26-MAR-2018 15:10, T wave inversion now evident in Inferior leads  Confirmed by Tatum Ye ([id number]) on 10/14/2020 6:33:59 AM    ECHO 3/28/2020  Findings      Left Ventricle   Normal left ventricle size. Mild concentric left ventricular hypertrophy. No wall motion abnormalities. Ejection fraction is visually estimated at 60-70%. Right Ventricle   Normal right ventricular size and function. Left Atrium   Normal left atrium. L atrial pressure not elevated      Right Atrium   Normal right atrium size. Mitral Valve   Structurally normal mitral valve. Tricuspid Valve   Normal tricuspid valve structure and function. Aortic Valve   Structurally normal aortic valve. Pulmonic Valve   The pulmonic valve was not well visualized. Pericardial Effusion   No pericardial effusion. Conclusions      Summary   No wall motion abnormalities. Ejection fraction is visually estimated at 60-70%. Normal left atrium. L atrial pressure not elevated   Structurally normal mitral valve. Structurally normal aortic valve. No pericardial effusion. Signature      ----------------------------------------------------------------   Electronically signed by August Mejia MD(Interpreting   physician) on 03/28/2018 07:28 PM    CT CHEST W CONTRAST 12/11/2020  FINDINGS:   Demonstrate 2 pulmonary nodules at the left lung base one of which   measures 10 x 9 mm second which measures 7 x 6 mm. These are in the   left lower lobe. This is superimposed on extensive pleural thickening   and mild central emphysema. No definite pulmonary mass is identified   No significant pleural fluid is identified. The heart is unremarkable   The aorta appears to be unremarkable   The mediastinum is unremarkable   The abdomen findings were reported on the CT scan of the abdomen   report. Please refer to the CT scan of the abdomen report for   findings.          Anesthesia Evaluation Patient summary reviewed and Nursing notes reviewed  Airway: Mallampati: II  TM distance: >3 FB   Neck ROM: full  Mouth opening: > = 3 FB Dental:      Comment: Extremely poor dentition, several missing teeth and chipped teeth. PT denies any loose or cracked teeth. Pulmonary:Negative Pulmonary ROS breath sounds clear to auscultation                             Cardiovascular:  Exercise tolerance: good (>4 METS),   (+) hypertension: mild,       ECG reviewed  Rhythm: regular  Rate: normal           Beta Blocker:  Not on Beta Blocker         Neuro/Psych:   (+) psychiatric history:            GI/Hepatic/Renal:   (+) hepatitis: C, liver disease: esophageal varices,          ROS comment: Hx: cirrhosis, Upper GI bleed 15 months ago, chronic superficial gastritis, alcohol abuse. Endo/Other:    (+) blood dyscrasia: anemia:., malignancy/cancer. Electrolyte problem: Hepatocellular carcinoma                 ROS comment: Hx: acute blood loss anemia Abdominal:           Vascular: negative vascular ROS. Anesthesia Plan      MAC     ASA 3       Induction: intravenous. Anesthetic plan and risks discussed with patient. Plan discussed with CRNA.               KENZIE TA  1/18/2021  2:26 PM

## 2021-01-19 NOTE — H&P (VIEW-ONLY)
Gastroenterology      Pre-operative History and Physical      HISTORY OF PRESENT ILLNESS:   Mildred Black is seen  for a follow-up visit. Reviewed with patient all prior labs, radiology and records with all questions and concerns addressed. Pt states he is seeing a new doctor Dr. Pritesh Lucero for primary care. Pt states he is still using alcohol, stating last drink was 3 days ago. Discussed with patient alcohol abstinence for future liver transplant. Discussed with patient new diagnosis of hepatocellular carcinoma, and need for referral to [facility] and Dr. Brody Ramirez. Pt denies abdominal pain, nausea, vomiting, diarrhea, chills, fever, hematochezia, melena, or weight loss. Plan of care as outlined, discussed with patient, all questions answered, states understanding. HISTORY:   Past Medical History:   Diagnosis Date    Cirrhosis ([address]     Esophageal varices (HCC)     GI bleed 10/2020    Hepatitis C     Hepatocellular carcinoma (HCC)     Hypertension        PERTINENT FAMILY HISTORY:  History reviewed. No pertinent family history. MEDICATIONS:    Current Outpatient Medications:     ondansetron (ZOFRAN) 8 MG tablet, Take 8 mg by mouth every 8 hours as needed for Nausea or Vomiting, Disp: , Rfl:     pantoprazole (PROTONIX) 40 MG tablet, Take 40 mg by mouth daily, Disp: , Rfl:     sucralfate (CARAFATE) 1 GM tablet, Take 1 tablet by mouth 4 times daily, Disp: 120 tablet, Rfl: 0    dicyclomine (BENTYL) 10 MG capsule, Take 10 mg by mouth 2 times daily, Disp: , Rfl:     ALLERGIES:  Patient has no known allergies.     PHYSICAL EXAM/GENERAL APPEARANCE: Constitutional: Appearance: well-developed, appropriate grooming, in no acute distress. . Communication: conversation appropriate. Skin: Inspection: warm dry and pink. Eyes: Conjunctivae/lids: lids normal, anicteric sclerae, moist conjunctivae. Pupils/irises: PERRLA. ENMT: Hearing: within normal limits. Oropharynx: normal tongue, hard and soft palate; posterior pharynx without erythema, exudate or lesions. Neck: Neck: normal motion and central trachea. Thyroid: normal size, consistency and position; no masses or tenderness. Respiratory: Effort: normal respiratory effort and no intercostal retractions. Auscultation: normal breath sounds, no rubs, wheezes or rhonchi. Chest: Inspection: symetrical without visualized masses. Palpation: no significant costal margin tenderness. Cardiovascular: Palpation: normal size, PMI is palpable in the 5th intercostal space, left midclavicular line, normal rythym. Auscultation: normal, S1 and S2, no gallops , no rubs or murmurs . Peripheral: no edema, varicosities or cyanosis. Gastrointestinal/Abdomen: Abdomen: normal consistency, no tenderness or masses, normal bowel sounds. Liver/Spleen: normal, normal size, not palpable . Rectal: rectal exam deferred by patient who requested it to be done at the time of the colonscopy, not performed. Extremities: RLE: no edema. LLE: no edema. Psychiatric: Orientation: oriented to time, space and person.       OTHER SIGNIFICANT FINDINGS: None    IMPRESSION/INITIAL DIAGNOSIS:   Alcoholic fatty liver, F4 fibrosis  GERD  Hepatitis C, chronic (Genotype 1a)   Varices, esophageal, banded with 2 bands  Liver cell carcinoma  Alcohol use          Electronically signed by Sridevi Bautista MD on 1/19/2021 at 10:27 AM 4

## 2021-01-19 NOTE — ANESTHESIA POSTPROCEDURE EVALUATION
Department of Anesthesiology  Postprocedure Note    Patient: Ashu Mata  MRN: [id number]  YOB: 1955  Date of evaluation: 1/18/2021  Time:  7:20 PM     Procedure Summary     Date: 01/18/21 Room / Location: [place] [address] / [facility]    Anesthesia Start: 5186 Anesthesia Stop: 5552    Procedure: EGD ESOPHAGOGASTRODUODENOSCOPY POSSIBLE BANDING (N/A ) Diagnosis: (IRON DEFICIENCY  ANEMIA)    Surgeons: Allison Matthews MD Responsible Provider: Angelika Vazquez DO    Anesthesia Type: MAC ASA Status: 3          Anesthesia Type: MAC    Dona Phase I: Dona Score: 10    Dona Phase II: Dona Score: 10    Last vitals: Reviewed and per EMR flowsheets.        Anesthesia Post Evaluation    Patient location during evaluation: PACU  Patient participation: complete - patient participated  Level of consciousness: awake and alert  Airway patency: patent  Nausea & Vomiting: no nausea and no vomiting  Complications: no  Cardiovascular status: hemodynamically stable  Respiratory status: acceptable  Hydration status: euvolemic

## 2021-01-19 NOTE — H&P
Gastroenterology      Pre-operative History and Physical      HISTORY OF PRESENT ILLNESS:   Rivas Jordan is seen  for a follow-up visit. Reviewed with patient all prior labs, radiology and records with all questions and concerns addressed. Pt states he is seeing a new doctor Dr. Juana Vargas for primary care. Pt states he is still using alcohol, stating last drink was 3 days ago. Discussed with patient alcohol abstinence for future liver transplant. Discussed with patient new diagnosis of hepatocellular carcinoma, and need for referral to [facility] and Dr. Maria Elena Jaramillo. Pt denies abdominal pain, nausea, vomiting, diarrhea, chills, fever, hematochezia, melena, or weight loss. Plan of care as outlined, discussed with patient, all questions answered, states understanding. HISTORY:   Past Medical History:   Diagnosis Date    [facility]     Esophageal varices (HCC)     GI bleed 10/2020    Hepatitis C     Hepatocellular carcinoma (HCC)     Hypertension        PERTINENT FAMILY HISTORY:  History reviewed. No pertinent family history. MEDICATIONS:    Current Outpatient Medications:     ondansetron (ZOFRAN) 8 MG tablet, Take 8 mg by mouth every 8 hours as needed for Nausea or Vomiting, Disp: , Rfl:     pantoprazole (PROTONIX) 40 MG tablet, Take 40 mg by mouth daily, Disp: , Rfl:     sucralfate (CARAFATE) 1 GM tablet, Take 1 tablet by mouth 4 times daily, Disp: 120 tablet, Rfl: 0    dicyclomine (BENTYL) 10 MG capsule, Take 10 mg by mouth 2 times daily, Disp: , Rfl:     ALLERGIES:  Patient has no known allergies.     PHYSICAL EXAM/GENERAL APPEARANCE:

## 2021-01-19 NOTE — OP NOTE
[address] [address]                                OPERATIVE REPORT    PATIENT NAME: Naheed Mancilla                   :        1955  MED REC NO:   [id number]                            ROOM:  ACCOUNT NO:   [de-identified]                           ADMIT DATE: 2021  PROVIDER:     Aruna Wolf MD    DATE OF PROCEDURE:  2021    PROCEDURE PERFORMED:  Upper endoscopy with banding. PREOPERATIVE DIAGNOSES:  History of liver CA, history of varices status  post banding. Procedure is done for evaluation as well as iron  deficiency anemia. POSTOPERATIVE DIAGNOSES:  One large varix in the esophagus, estimated to  be grade 4/4 with stigmata of pending bleeding, banded with two bands. Stomach showed mild gastritis. No gastric varices. Duodenum  unremarkable. No biopsies were taken in view of the patient's severe  thrombocytopenia. ANESTHESIA:  LMAC. ESTIMATED BLOOD LOSS:  N/A    NOTE:  Prior to the procedure an informed consent was obtained from the  patient after explaining the benefits as well as the risks,  alternatives, and complications of the procedure to the patient, who  understood and agreed. PROCEDURE:  With the patient in the left lateral decubitus position, the  Olympus GIF-100 forward-viewing videoscope was introduced into the  esophagus, the evaluation of which showed one large varix, estimated to  be grade 4/4 with stigmata of pending rupture and no hiatal hernia was  seen. The scope was then advanced through the gastroesophageal junction into  the gastric body, along the greater curvature. Evaluation of the body  of the stomach showed mild gastritis. The scope was then advanced through the pylorus into the duodenal bulb  and second portion of the duodenum, both of which appeared to be  unremarkable. Duodenum unremarkable. No gastric varices seen.   No evidence of active or recent bleeding. The scope was then retrieved and  retroflexed in the prepyloric antrum, with thorough evaluation of the  cardiac and fundal portions of the stomach, which appeared to be within  normal limits. The scope was then straightened, the area deflated, and the procedure  was terminated by withdrawing the scope and conducting a second look on  the way out, which was essentially the same. The patient tolerated the procedure well. The banding device was attached to the upper endoscope, which was  reintroduced with two bands placed on the aforementioned large varix  with excellent hemostasis. The patient tolerated the procedure well.         Yaz Vaca MD    D: 01/18/2021 15:05:12       T: 01/18/2021 15:08:47     [email or web address]  Job#: [id number]     Doc#: [id number]    CC:  Astrid Reyna MD Oletha Pelton, MD

## 2021-01-29 NOTE — TELEPHONE ENCOUNTER
I called the patient's insurance [employer] and I spoke with automated system which transferred me to [place]. There I spoke with Ashlyn [employer] [facility] rep. She stated authorization is required and to provide her with clinical, which I did. With the clinicals I provided authorization was approved for CPT code  24003. I called radiology scheduling and I spoke with Jennifer Granados. She scheduled the patient at [facility] on 2/1/2021 at 1:00pm.   I called the patient and informed him that he is scheduled at [facility] on 2/1/2021 at 1:00pm with a 12:30 pm arrival.  I instructed the patient to park in the ER parking lot, enter through door B, and NPO 4 hours prior. Patient confirmed.            Electronically signed by Catarino Hunter on 1/29/21 at 2:51 PM EST

## 2021-02-04 NOTE — TELEPHONE ENCOUNTER
I called patient and made him aware that they moved his paracentesis up to 2/8/21 at [facility] and he needs to be her by 7:30am.  He verbalized understanding and confirmed this appt.     Electronically signed by Belinda Louis RN on 2/4/2021 at 3:19 PM

## 2021-02-04 NOTE — PROGRESS NOTES
Hepatobiliary and Pancreatic Surgery Attending History and Physical    Patient's Name/Date of Birth: Jamie Mcwilliams /1955 (42 y.o.)    Date: February 4, 2021     CC: hepatocellular carcinoma    HPI:  Patient is a very pleasant 72year old male whom has had hepatitis C cirrhosis. He is a patient of Dr. Ramone Borrego whom has been undergoing esophageal banding for the past 5 - 6 years. He has never had a paracentesis nor hepatic encephalopathy. He does drink 4 beers daily. He had imaging performed his last hospital admission which showed a 6cm segment 6 hepatocellular carcinoma - biopsy proven. His platelet count is 95H and he does have low volume ascites. His MELD Na = 8 and he's a Child Haile A (6). He denies any weight loss or abdominal pain. His AFP is 65k. He had a triphasic CT scan of his liver which showed that the tumor was actually 9cm in diameter with new onset ascites. He also had a CT chest which showed two new pulmonary nodules. He had his y90 12/20. He had a follow up triphasic CT scan which shows significant reduction in his tumor burden in his liver. However he has developed significant ascites. He states that it does cause some right upper quadrant discomfort and shortnes of breath. Physical Exam:  BP (!) 146/71 (Site: Right Upper Arm, Position: Sitting, Cuff Size: Medium Adult)   Pulse 101   Temp 99.5 °F (37.5 °C) (Temporal)   Resp 18   Ht 6' (1.829 m)   Wt 185 lb (83.9 kg)   SpO2 96%   BMI 25.09 kg/m²       PSYCH: mood and affect normal, alert and oriented x 3  CONSTITUTIONAL: No apparent distress, comfortable  EYES: Sclera white, pupils equal round and reactive to light  ENMT:  Hearing normal, trachea midline, ears externally intact  LYMPH: no lympadenopathy in neck. Nolympadenopathy in groins  RESP: Breath sounds were clear and equal with no rales, wheezes, or rhonchi. Respiratory effort was normal with no retractions or use of accessory muscles.   CV: Heart soundswere normal with a regular rate and rhythm. No pedal edema  GI/ Abdomen: Soft, nondistended, nontender, no guarding, no peritoneal signs, positive fluid wave  MSK: no clubbing/ no cyanosis/ gaitnormal       Assessment/Plan:  Hepatocellular carcinoma (9cm) segment 6 without portal vein invasion, Child's B(7) now , MELD NA was 8 now 11 post y90 , portal hypertension with esophageal varices, and now ascites secondary to hepatic decompensation s/p y90  - Paracentesis  - we discussed that ascites can get worse post y90 but needs to adhere to a strict low salt diet  - low salt diet - no more than 2g  - labs - plt's 85, bili 1.3, INR 1.4, ammonia 17    20 Minutes of which greater than 50% was spent counseling or coordinating his care. Thank you for the consultation allowing me to take part in Mr. Armaan Rojas' care.      Please send a copy of my note to Dr. Mariah Cabrera    Electronically signed by Lex John MD on 2/4/2021 at 4:39 PM

## 2021-02-08 NOTE — BRIEF OP NOTE
Brief Postoperative Note    Talya Duval  YOB: 1955  [id number]    Pre-operative Diagnosis: ascites    Post-operative Diagnosis: Same    Procedure:paracentesis    Anesthesia: Local    Estimated Blood Loss: < 10 cc    Surgeon: Erich FLOREZ     Complications: none    Specimen obtained: Yes, fluid, serous    Findings: fluid, drained with catheter drainage      Martha Mendieta II   2/8/2021 9:35 AM

## 2021-02-08 NOTE — PROGRESS NOTES
Patient ambulated by self to Radiology department for paracentesis. Allergies, home medications, H&P and fasting instructions reviewed with patient. Vital signs taken. Procedural instructions given, questions answered, understanding expressed and consent signed. Patient given fluoroscopy education, no questions at this time.

## 2021-02-24 NOTE — PROGRESS NOTES
IRFLOWSHEET    Date: 2/24/2021    Time: 1:18 PM     Exam: Ultrasound Guided Paracentesis    Radiologist Performing Procedure: Dr. Yoko Corona    Nurse Reporting/Phone: [phone]     Nurse Receiving: Miguel Hernandez    Permit signed:      Yes    ID Band Checklist: Yes    Allergies: Patient has no known allergies. TIME OUT: 1332    PROCEDURE START TIME: 3475    Puncture Site: Right Abdomen    Puncture Time: 0733    Catheters: 5Fr. LABS:   Lab Results   Component Value Date    INR 1.4 02/04/2021    PROTIME 16.3 (H) 02/04/2021           Lab Results   Component Value Date    CREATININE 0.8 02/04/2021    BUN 12 02/04/2021          Lab Results   Component Value Date    HGB 8.7 (L) 02/04/2021    HCT 29.0 (L) 02/04/2021    PLT 85 (L) 02/04/2021         PROCEDURE END TIME: 1406    Total Contrast: N/A    Fluoroscopy Time: N/A    Complications:  None    Comments: Pt brought to IR room for paracentesis from emergency department. Pt is alert and oriented, Ultrasound images taken prior to procedure. Procedure is explained to patient, including possible risks. Pt verbalizes understanding and consent form signed. Procedure done under sterile technique and guidance of ultrasound imaging. 1% Lidocaine is used during procedure for comfort measures. A total of 5,400ml's of light cherry colored ascitic fluid drained during procedure. Catheter removed and op-site dressing applied to site with no bleeding noted. Pt tolerated procedure well and denies any pain after. Pt given discharge instructions and pt verbalizes understanding of post procedure instructions. Report called to emergency and pt transferred back to ER via cart.

## 2021-02-24 NOTE — TELEPHONE ENCOUNTER
Patient called in stating he needed a paracentesis again, abdomen is distended and he is uncomfortable. I called IR and patient will need covid tested and after they get results they can get him scheduled and that will not be until early next week. I called patient back and explained all this to him and he does not think he can wait until then so he is going to go to ER instead. I instructed patient to start weighing himself every morning and as soon as he sees a 6-8 pound weight gain to call and we can get him covid tested and scheduled before he gets to the point he is miserable. He stated he will have to go buy a scale and he will start to keep track of his weight.       Electronically signed by Rey Romano RN on 2/24/2021 at 8:45 AM

## 2021-02-24 NOTE — ED PROVIDER NOTES
ED Attending  CC: No                                                                                                                                    Department of Emergency Medicine   ED  Provider Note  Admit Date/RoomTime: 2/24/2021 11:44 AM  ED Room: 06/06        HPI:  2/24/21,   Time: 12:52 PM CÉSAR Valdivia is a 72 y.o. male presenting to the ED for abdominal bloating/ascities, beginning few days ago. The complaint has been persistent, moderate in severity, and worsened by nothing. Known history of hepatocellular carcinoma. He is being treated at the [facility]. He states that he started having some recurrent abdominal bloating. He has had 2 paracentesis procedures the last of which was on February 8. He states that the time they took off 5 L of fluids. He started having increasing abdominal bloating and pain over the past few days. The patient denies any fever, chills, or vomiting. He does have mild shortness of breath. He is not on any blood thinners. States that he came in today hoping they could drain some of the fluid off his abdomen. He does have discomfort over the abdomen diffusely. No redness or rash. Markedly decreased appetite. States he tried to drink protein shakes as directed.           ROS:     Constitutional: Negative for fever and chills  HENT: Negative for ear pain, sore throat and sinus pressure  Eyes: Negative for pain, discharge and redness  Respiratory:  Negative for shortness of breath, cough and wheezing  Cardiovascular: Negative for CP, edema or palpitations  Gastrointestinal: See HPI  Genitourinary:  See HPI  Musculoskeletal: Negative for back pain and arthralgia  Skin: Negative for rash and wound  Neurological: Negative for weakness and headaches  Hematological: Negative for adenopathy    All other systems reviewed and are negative      -------------------------------- PAST HISTORY ---------------------------------- Past Medical History:  has a past medical history of Cirrhosis ([address] Esophageal varices ([address] GI bleed, Hepatitis C, Hepatocellular carcinoma ([address] and Hypertension. Past Surgical History:  has a past surgical history that includes Leg Surgery; Endoscopy, colon, diagnostic (04/30/2013); Upper gastrointestinal endoscopy (09/02/2016); Upper gastrointestinal endoscopy (03/27/2018); Upper gastrointestinal endoscopy (N/A, 03/27/2018); Upper gastrointestinal endoscopy (N/A, 10/14/2020); CT NEEDLE BIOPSY LIVER PERCUTANEOUS (10/15/2020); IR EMBOLIZATION TUMOR/ORGAN ISCH/INFARC (12/14/2020); IR PORT PLACEMENT > 5 YEARS (12/14/2020); IR EMBOLIZATION TUMOR/ORGAN ISCH/INFARC (12/16/2020); Upper gastrointestinal endoscopy (N/A, 01/18/2021); and Paracentesis (Right, 02/08/2021). Social History:  reports that he has never smoked. He has never used smokeless tobacco. He reports previous alcohol use. He reports that he does not use drugs. Family History: family history is not on file. The patients home medications have been reviewed. Allergies: Patient has no known allergies.     --------------------------------- RESULTS ------------------------------------------  All laboratory and radiology results have been personally reviewed by myself   LABS:  Results for orders placed or performed during the hospital encounter of 02/24/21   CBC Auto Differential   Result Value Ref Range    WBC 1.8 (L) 4.5 - 11.5 E9/L    RBC 3.30 (L) 3.80 - 5.80 E12/L    Hemoglobin 8.0 (L) 12.5 - 16.5 g/dL    Hematocrit 26.5 (L) 37.0 - 54.0 %    MCV 80.3 80.0 - 99.9 fL    MCH 24.2 (L) 26.0 - 35.0 pg    MCHC 30.2 (L) 32.0 - 34.5 %    RDW 18.5 (H) 11.5 - 15.0 fL    Platelets 922 (L) 591 - 450 E9/L    MPV 10.7 7.0 - 12.0 fL   Comprehensive Metabolic Panel w/ Reflex to MG   Result Value Ref Range    Sodium 133 132 - 146 mmol/L    Potassium reflex Magnesium 3.9 3.5 - 5.0 mmol/L    Chloride 99 98 - 107 mmol/L    CO2 26 22 - 29 mmol/L Anion Gap 8 7 - 16 mmol/L    Glucose 107 (H) 74 - 99 mg/dL    BUN 17 8 - 23 mg/dL    CREATININE 0.9 0.7 - 1.2 mg/dL    GFR Non-African American >60 >=60 mL/min/1.73    GFR African American >60     Calcium 8.8 8.6 - 10.2 mg/dL    Total Protein 6.6 6.4 - 8.3 g/dL    Albumin 2.7 (L) 3.5 - 5.2 g/dL    Total Bilirubin 1.6 (H) 0.0 - 1.2 mg/dL    Alkaline Phosphatase 75 40 - 129 U/L    ALT 10 0 - 40 U/L    AST 34 0 - 39 U/L   Troponin   Result Value Ref Range    Troponin <0.01 0.00 - 0.03 ng/mL   Lactic Acid, Plasma   Result Value Ref Range    Lactic Acid 2.9 (H) 0.5 - 2.2 mmol/L   Protime-INR   Result Value Ref Range    Protime 16.6 (H) 9.3 - 12.4 sec    INR 1.4    APTT   Result Value Ref Range    aPTT 31.8 24.5 - 35.1 sec   EKG 12 Lead   Result Value Ref Range    Ventricular Rate 105 BPM    Atrial Rate 105 BPM    P-R Interval 142 ms    QRS Duration 76 ms    Q-T Interval 354 ms    QTc Calculation (Bazett) 467 ms    P Axis 62 degrees    R Axis 54 degrees    T Axis 67 degrees       RADIOLOGY:  Interpreted by Radiologist.  US GUIDED PARACENTESIS   Final Result   Successful ultrasound guided paracentesis. XR CHEST PORTABLE   Final Result   Multiple bilateral lung nodules. ----------------- NURSING NOTES AND VITALS REVIEWED ---------------   The nursing notes within the ED encounter and vital signs as below have been reviewed. BP (!) 148/78   Pulse 103   Temp 97.8 °F (36.6 °C)   Resp 20   SpO2 99%   Oxygen Saturation Interpretation: Normal      --------------------------------PHYSICAL EXAM------------------------------------      Constitutional/General: Alert and oriented x3, well appearing, non toxic in NAD  Head: NC/AT  Eyes: PERRL, EOMI  Mouth: Oropharynx clear, handling secretions, no trismus  Neck: Supple, full ROM, no meningeal signs  Pulmonary: Lungs clear to auscultation bilaterally, no wheezes, rales, or rhonchi.  Not in respiratory distress Cardiovascular:  Regular rate and rhythm, no murmurs, gallops, or rubs. 2+ distal pulses  Abdomen: Soft, + BS. Marked bloating and distension secondary to ascites. Skin is taut. Diffuse and mild abdominal tenderness. No palpable rigidity, rebound or guarding  Extremities: Moves all extremities x 4. Warm and well perfused  Skin: warm and dry without rash  Neurologic: GCS 15,  Intact. No focal deficits  Psych: Normal Affect      ------------------------ ED COURSE/MEDICAL DECISION MAKING----------------------  Medications   albumin human 25 % IV solution 25 g (has no administration in time range)         Medical Decision Making:    Patient had labs drawn and there was sent down to interventional radiology for ultrasound-guided paracentesis. They removed 6 L of fluids in IR. I discussed the case with Anival Vasquez who is the FADIA for Dr. Srini Suh. She would like us to give a dose of albumin here in the ED. Advised to have the patient follow-up at his office for further recommendations regarding this recurrent fluid buildup. The patient is aware and agreeable to this plan       Counseling: The emergency provider has spoken with the patient and discussed todays results, in addition to providing specific details for the plan of care and counseling regarding the diagnosis and prognosis. Questions are answered at this time and they are agreeable with the plan.      ------------------------ IMPRESSION AND DISPOSITION -------------------------------    IMPRESSION  1. Ascites of liver    2.  Status post abdominal paracentesis        DISPOSITION  Disposition: Discharge to home  Patient condition is stable                   Albina Rocha PA-C  02/24/21 3652

## 2021-03-10 NOTE — ED PROVIDER NOTES
ED Attending: CC: [facility]  Department of Emergency Medicine   ED  Encounter Note  Admit Date/RoomTime: 3/10/2021 11:09 AM  ED Room:     NAME: Angelo Castrejon  : 1955  MRN: [id number]     Chief Complaint:  Abdominal Pain (sent in for parasentesis)    History of Present Illness        Angelo Castrejon is a 72 y.o. old male who presents to the emergency department by private vehicle, for gradual onset abdominal distention history of hepatocellular carcinoma requiring frequent paracentesis. Patient was unable to arrange outpatient paracentesis today due to his inability to obtain a Covid test.  He did not think he can wait until Monday. There has been NO diarrhea or fever. .  ROS   Pertinent positives and negatives are stated within HPI, all other systems reviewed and are negative. Past Medical History:  has a past medical history of Cirrhosis ([facility] Esophageal varices ([facility] [address] GI bleed, Hepatitis C, Hepatocellular carcinoma ([facility] [address] and Hypertension. Surgical History:  has a past surgical history that includes Leg Surgery; Endoscopy, colon, diagnostic (2013); Upper gastrointestinal endoscopy (2016); Upper gastrointestinal endoscopy (2018); Upper gastrointestinal endoscopy (N/A, 2018); Upper gastrointestinal endoscopy (N/A, 10/14/2020); CT NEEDLE BIOPSY LIVER PERCUTANEOUS (10/15/2020); IR EMBOLIZATION TUMOR/ORGAN ISCH/INFARC (2020); IR PORT PLACEMENT > 5 YEARS (2020); IR EMBOLIZATION TUMOR/ORGAN ISCH/INFARC (2020); Upper gastrointestinal endoscopy (N/A, 2021); and Paracentesis (Right, 2021). Social History:  reports that he has never smoked. He has never used smokeless tobacco. He reports previous alcohol use. He reports that he does not use drugs. Family History: family history is not on file. Allergies: Patient has no known allergies.     Physical Exam   Oxygen Saturation Interpretation: Normal.        ED Anisocytosis 2+     Polychromasia 2+     Hypochromia 2+     Poikilocytes 1+     Ovalocytes 1+     Target Cells 1+    APTT   Result Value Ref Range    aPTT 33.5 24.5 - 35.1 sec   Protime-INR   Result Value Ref Range    Protime 15.4 (H) 9.3 - 12.4 sec    INR 1.4    Platelet Confirmation   Result Value Ref Range    Platelet Confirmation SEE BELOW      Imaging: All Radiology results interpreted by Radiologist unless otherwise noted. No orders to display       ED Course / Medical Decision Making   Medications - No data to display       Consultations:             None    Procedures:     PROCEDURE NOTE  3/10/21           PARACENTESIS   Risks, benefits and alternatives (for applicable procedures below) described. Performed By: CY Cagle. Indication: Therapeutic Ascites. Informed consent: by patient or legal gardian. Prep:  The skin was cleansed with povidone iodine and draped in a sterile fashion. Anesthetic: The paracentesis needle insertion area was anesthetized with Lidocaine 1% without epinephrine. Paracentesis:  The patient was positioned in supine position with the head of the bed slightly elevated and the landmarks were identified. A paracentesis catheter was then advanced into the abdominal cavity over a needle and the needle was withdrawn and fluid returned which was serosanguinous. A total volume of 6300cc was withdrawn which was sent to the lab for no testing. A sterile dressing was then applied to the site. Complications: None  The patient tolerated the procedure well. Plan of Care/Counseling:  I reviewed today's visit with the patient in addition to providing specific details for the plan of care and counseling regarding the diagnosis and prognosis. Questions are answered at this time and are agreeable with the plan. Assessment      1. Other ascites    2. S/P abdominal paracentesis    3.  Hepatocellular carcinoma ([facility] [address]      This patient's ED course included: a personal history and physicial examination, re-evaluation prior to disposition, multiple bedside re-evaluations, cardiac monitoring and continuous pulse oximetry  This patient has remained hemodynamically stable and improved during their ED course. Plan   Discharge home  Patient condition is good. New Medications     Discharge Medication List as of 3/10/2021  3:29 PM        Electronically signed by CY Garza   DD: 3/10/21  **This report was transcribed using voice recognition software. Every effort was made to ensure accuracy; however, inadvertent computerized transcription errors may be present.   END OF PROVIDER NOTE       Lee Lundbergma  03/13/21 1415

## 2021-03-12 NOTE — PROGRESS NOTES
Hepatobiliary and Pancreatic Surgery Attending History and Physical    Patient's Name/Date of Birth: Lalit Valdivia /1955 (71 y.o.)    Date: March 12, 2021     CC: hepatocellular carcinoma    HPI:  Patient is a very pleasant 72year old male whom has had hepatitis C cirrhosis. He is a patient of Dr. Petros Chew whom has been undergoing esophageal banding for the past 5 - 6 years. He has never had a paracentesis nor hepatic encephalopathy. He does drink 4 beers daily. He had imaging performed his last hospital admission which showed a 6cm segment 6 hepatocellular carcinoma - biopsy proven. His platelet count is 58M and he does have low volume ascites. His MELD Na = 8 and he's a Child Haile A (6). He denies any weight loss or abdominal pain. His AFP is 65k. He had a triphasic CT scan of his liver which showed that the tumor was actually 9cm in diameter with new onset ascites. He also had a CT chest which showed two new pulmonary nodules. He had his y90 12/20. He had a follow up triphasic CT scan which shows significant reduction in his tumor burden in his liver. However he has developed significant ascites. He states that it does cause some right upper quadrant discomfort and shortnes of breath. He is somewhat sticking to a low salt diet. 6L was removed which is the highest.  . He is undergoing treatments with Dr. Zion Hui and had an infusion yesterday. He is getting paracentesis' every 2 weeks.   His last platelet count is 53 and his INR is 1.4    Physical Exam:  /76 (Site: Right Upper Arm, Position: Sitting, Cuff Size: Medium Adult)   Pulse 99   Temp 96.9 °F (36.1 °C) (Temporal)   Resp 16   Ht 6' (1.829 m)   Wt 166 lb 12.8 oz (75.7 kg)   SpO2 99%   BMI 22.62 kg/m²       PSYCH: mood and affect normal, alert and oriented x 3  CONSTITUTIONAL: No apparent distress, comfortable  EYES: Sclera white, pupils equal round and reactive to light  ENMT:  Hearing normal, trachea midline, ears externally intact  LYMPH: no lympadenopathy in neck. Nolympadenopathy in groins  RESP: Breath sounds were clear and equal with no rales, wheezes, or rhonchi. Respiratory effort was normal with no retractions or use of accessory muscles. CV: Heart soundswere normal with a regular rate and rhythm. No pedal edema  GI/ Abdomen: Soft, distended, nontender, no guarding, no peritoneal signs, positive fluid wave  MSK: no clubbing/ no cyanosis/ gaitnormal       Assessment/Plan:  Hepatocellular carcinoma (9cm) segment 6 without portal vein invasion, Child's B(7) now , MELD NA was 8 now 11 post y90 , portal hypertension with esophageal varices, and now ascites secondary to hepatic decompensation s/p y90  - he is seeing Dr. Davis Gravely Next week for portal hypertension management  - will plan for another US paracentesis in 2 weeks. 20 Minutes of which greater than 50% was spent counseling or coordinating his care. Thank you for the consultation allowing me to take part in Mr. Danni Still' care.      Please send a copy of my note to Dr. Renetta Deshpande, and Ngoc Marley    Electronically signed by Mela Lezama MD on 3/12/2021 at 8:40 AM

## 2021-03-24 NOTE — H&P (VIEW-ONLY)
Interventional Radiology  Attending Pre-operative History and Physical    DIAGNOSIS:    Patient Active Problem List   Diagnosis    Hematemesis with nausea    Acute blood loss anemia    Alcohol abuse    Gastrointestinal hemorrhage with hematemesis    Chronic superficial gastritis without bleeding    Secondary esophageal varices with bleeding (HCC)    GI bleed    Esophageal varices (HCC)    H/O esophageal varices    Hepatocellular carcinoma (HCC)    Postoperative observation       CHIEF COMPLAINT: <principal problem not specified>          Current Outpatient Medications:     acetaminophen (TYLENOL) 500 MG tablet, Take 500 mg by mouth every 6 hours as needed for Pain, Disp: , Rfl:     influenza virus trivalent vaccine (FLUZONE) injection, Inject 0.5 mLs into the muscle once *GIVEN AT [facility]*, Disp: , Rfl:     traMADol (ULTRAM) 50 MG tablet, Take 50 mg by mouth every 8 hours.  , Disp: , Rfl:     sucralfate (CARAFATE) 1 GM tablet, Take 1 tablet by mouth 4 times daily, Disp: 120 tablet, Rfl: 0    No Known Allergies    Past Medical History:   Diagnosis Date    Cir[facility]osis ([facility] [address]     Esophageal varices (HCC)     GI bleed 10/2020    Hepatitis C     Hepatocellular carcinoma ([facility] [address]     Hypertension        Past Surgical History:   Procedure Laterality Date    CT NEEDLE BIOPSY LIVER PERCUTANEOUS  10/15/2020    CT NEEDLE BIOPSY LIVER PERCUTANEOUS 10/15/2020 [facility] CT    ENDOSCOPY, COLON, DIAGNOSTIC  04/30/2013    IR EMBOLIZATION TUMOR / ORGAN  12/14/2020    IR EMBOLIZATION TUMOR / ORGAN 12/14/2020 [facility]    IR EMBOLIZATION TUMOR / ORGAN  12/16/2020    IR EMBOLIZATION TUMOR / ORGAN 12/16/2020 [facility]    IR PORT PLACEMENT EQUAL OR GREATER THAN 5 YEARS  12/14/2020    IR PORT PLACEMENT EQUAL OR GREATER THAN 5 YEARS 12/14/2020 [facility]    LEG SURGERY      right leg    PARACENTESIS Right 02/08/2021 Dr. Rose Marie Doan, 4950cc hazy maroon fluid    UPPER GASTROINTESTINAL ENDOSCOPY  09/02/2016    [place], nonbleeding esophageal varicies and gastritis    UPPER GASTROINTESTINAL ENDOSCOPY  03/27/2018    UPPER GASTROINTESTINAL ENDOSCOPY N/A 03/27/2018    EGD BAND LIGATION performed by Escobar Rojas MD at [address] N/A 10/14/2020    EGD CONTROL HEMORRHAGE performed by Zeeshan Fuentes MD at [address] N/A 01/18/2021    EGD ESOPHAGOGASTRODUODENOSCOPY POSSIBLE BANDING performed by Zeeshan Fuentes MD at [facility] ENDOSCOPY       No family history on file.     Social History     Socioeconomic History    Marital status: Single     Spouse name: Not on file    Number of children: Not on file    Years of education: Not on file    Highest education level: Not on file   Occupational History    Not on file   Social Needs    Financial resource strain: Not on file    Food insecurity     Worry: Not on file     Inability: Not on file    Transportation needs     Medical: Not on file     Non-medical: Not on file   Tobacco Use    Smoking status: Never Smoker    Smokeless tobacco: Never Used   Substance and Sexual Activity    Alcohol use: Not Currently     Frequency: 4 or more times a week     Drinks per session: 3 or 4     Binge frequency: Daily or almost daily     Comment: at least a 6 pack daily    Drug use: No    Sexual activity: Not on file   Lifestyle    Physical activity     Days per week: Not on file     Minutes per session: Not on file    Stress: Not on file   Relationships    Social connections     Talks on phone: Not on file     Gets together: Not on file     Attends Alevism service: Not on file     Active member of club or organization: Not on file     Attends meetings of clubs or organizations: Not on file     Relationship status: Not on file    Intimate partner violence     Fear of current or ex partner: Not on file Emotionally abused: Not on file     Physically abused: Not on file     Forced sexual activity: Not on file   Other Topics Concern    Not on file   Social History Narrative    Not on file       ROS: Non-contributory other than as noted above    PHYSICAL EXAM:      Heart and Lungs:  demonstrate no contraindications to proceed    DATA:  CBC:   Lab Results   Component Value Date    WBC 4.3 03/10/2021    RBC 3.67 03/10/2021    RBC  09/01/2016      RBC           [id number]    transfused   09/02/16  00:19  SCC    RBC  09/01/2016      RBC           [id number]    transfused   09/02/16  19:47  SCC    HGB 9.2 03/10/2021    HCT 31.4 03/10/2021    MCV 85.6 03/10/2021    MCH 25.1 03/10/2021    MCHC 29.3 03/10/2021    RDW 25.6 03/10/2021    PLT 53 03/10/2021    MPV NOT CALC 03/10/2021     CBC with Differential:    Lab Results   Component Value Date    WBC 4.3 03/10/2021    RBC 3.67 03/10/2021    RBC  09/01/2016      RBC           [id number]    transfused   09/02/16  00:19  SCC    RBC  09/01/2016      RBC           [id number]    transfused   09/02/16  19:47  SCC    HGB 9.2 03/10/2021    HCT 31.4 03/10/2021    PLT 53 03/10/2021    MCV 85.6 03/10/2021    MCH 25.1 03/10/2021    MCHC 29.3 03/10/2021    RDW 25.6 03/10/2021    SEGSPCT 64 05/01/2013    LYMPHOPCT 8.4 03/10/2021    MONOPCT 14.3 03/10/2021    MYELOPCT 1.0 02/24/2021    BASOPCT 0.0 03/10/2021    MONOSABS 0.61 03/10/2021    LYMPHSABS 0.36 03/10/2021    EOSABS 0.02 03/10/2021    BASOSABS 0.00 03/10/2021     Platelets:    Lab Results   Component Value Date    PLT 53 03/10/2021     BUN/Creatinine:    Lab Results   Component Value Date    BUN 17 02/24/2021    CREATININE 0.9 02/24/2021       ASSESSMENT AND PLAN:  1. Ascites , plan for paracentesis  2. Procedure options, risks and benefits reviewed with patient. Patient expresses understanding.     Electronically signed by Pamela Fletcher II, MD on 3/24/2021 at 9:08 AM

## 2021-03-24 NOTE — BRIEF OP NOTE
Brief Postoperative Note    Ulices Conner  YOB: 1955  [id number]    Pre-operative Diagnosis and Procedure: Ascites , plan for paracentesis    Post-operative Diagnosis: Same    Anesthesia: Local    Estimated Blood Loss: < 10 cc    Surgeon: Dail Gottron D.     Complications: none    Specimen obtained: yes     Findings: none     Reginaldo Carter II   3/24/2021 9:09 AM

## 2021-03-24 NOTE — H&P
Interventional Radiology  Attending Pre-operative History and Physical    DIAGNOSIS:    Patient Active Problem List   Diagnosis    Hematemesis with nausea    Acute blood loss anemia    Alcohol abuse    Gastrointestinal hemorrhage with hematemesis    Chronic superficial gastritis without bleeding    Secondary esophageal varices with bleeding (HCC)    GI bleed    Esophageal varices (HCC)    H/O esophageal varices    Hepatocellular carcinoma (HCC)    Postoperative observation       CHIEF COMPLAINT: <principal problem not specified>          Current Outpatient Medications:     acetaminophen (TYLENOL) 500 MG tablet, Take 500 mg by mouth every 6 hours as needed for Pain, Disp: , Rfl:     influenza virus trivalent vaccine (FLUZONE) injection, Inject 0.5 mLs into the muscle once *GIVEN AT [facility]*, Disp: , Rfl:     traMADol (ULTRAM) 50 MG tablet, Take 50 mg by mouth every 8 hours.  , Disp: , Rfl:     sucralfate (CARAFATE) 1 GM tablet, Take 1 tablet by mouth 4 times daily, Disp: 120 tablet, Rfl: 0    No Known Allergies    Past Medical History:   Diagnosis Date    [facility]osis ([facility] [address]     Esophageal varices (HCC)     GI bleed 10/2020    Hepatitis C     Hepatocellular carcinoma ([facility] [address]     Hypertension        Past Surgical History:   Procedure Laterality Date    CT NEEDLE BIOPSY LIVER PERCUTANEOUS  10/15/2020    CT NEEDLE BIOPSY LIVER PERCUTANEOUS 10/15/2020 [facility] CT    ENDOSCOPY, COLON, DIAGNOSTIC  04/30/2013    IR EMBOLIZATION TUMOR / ORGAN  12/14/2020    IR EMBOLIZATION TUMOR / ORGAN 12/14/2020 [facility] Snell [facility]    IR EMBOLIZATION TUMOR / ORGAN  12/16/2020    IR EMBOLIZATION TUMOR / ORGAN 12/16/2020 Karen Snell [facility]    IR PORT PLACEMENT EQUAL OR GREATER THAN 5 YEARS  12/14/2020    IR PORT PLACEMENT EQUAL OR GREATER THAN 5 YEARS 12/14/2020 Karen Snell [facility]    LEG SURGERY      right leg    PARACENTESIS Right 02/08/2021 Dr. Jose G Kelly, 4950cc hazy maroon fluid    UPPER GASTROINTESTINAL ENDOSCOPY  09/02/2016    Genny [place]eta, nonbleeding esophageal varicies and gastritis    UPPER GASTROINTESTINAL ENDOSCOPY  03/27/2018    UPPER GASTROINTESTINAL ENDOSCOPY N/A 03/27/2018    EGD BAND LIGATION performed by Pavan Moore MD at [address] [place] [address] N/A 10/14/2020    EGD CONTROL HEMORRHAGE performed by Nayeli Flanagan MD at [address] [place] [address] N/A 01/18/2021    EGD ESOPHAGOGASTRODUODENOSCOPY POSSIBLE BANDING performed by Nayeli Flanagan MD at [facility]       No family history on file.     Social History     Socioeconomic History    Marital status: Single     Spouse name: Not on file    Number of children: Not on file    Years of education: Not on file    Highest education level: Not on file   Occupational History    Not on file   Social Needs    Financial resource strain: Not on file    Food insecurity     Worry: Not on file     Inability: Not on file    Transportation needs     Medical: Not on file     Non-medical: Not on file   Tobacco Use    Smoking status: Never Smoker    Smokeless tobacco: Never Used   Substance and Sexual Activity    Alcohol use: Not Currently     Frequency: 4 or more times a week     Drinks per session: 3 or 4     Binge frequency: Daily or almost daily     Comment: at least a 6 pack daily    Drug use: No    Sexual activity: Not on file   Lifestyle    Physical activity     Days per week: Not on file     Minutes per session: Not on file    Stress: Not on file   Relationships    Social connections     Talks on phone: Not on file     Gets together: Not on file     Attends Spiritism service: Not on file     Active member of club or organization: Not on file     Attends meetings of clubs or organizations: Not on file     Relationship status: Not on file    Intimate partner violence     Fear of current or ex partner: Not on file Emotionally abused: Not on file     Physically abused: Not on file     Forced sexual activity: Not on file   Other Topics Concern    Not on file   Social History Narrative    Not on file       ROS: Non-contributory other than as noted above    PHYSICAL EXAM:      Heart and Lungs:  demonstrate no contraindications to proceed    DATA:  CBC:   Lab Results   Component Value Date    WBC 4.3 03/10/2021    RBC 3.67 03/10/2021    RBC  09/01/2016      RBC           [id number]    transfused   09/02/16  00:19  SCC    RBC  09/01/2016      RBC           [id number]    transfused   09/02/16  19:47  SCC    HGB 9.2 03/10/2021    HCT 31.4 03/10/2021    MCV 85.6 03/10/2021    MCH 25.1 03/10/2021    MCHC 29.3 03/10/2021    RDW 25.6 03/10/2021    PLT 53 03/10/2021    MPV NOT CALC 03/10/2021     CBC with Differential:    Lab Results   Component Value Date    WBC 4.3 03/10/2021    RBC 3.67 03/10/2021    RBC  09/01/2016      RBC           [id number]    transfused   09/02/16  00:19  SCC    RBC  09/01/2016      RBC           [id number]    transfused   09/02/16  19:47  SCC    HGB 9.2 03/10/2021    HCT 31.4 03/10/2021    PLT 53 03/10/2021    MCV 85.6 03/10/2021    MCH 25.1 03/10/2021    MCHC 29.3 03/10/2021    RDW 25.6 03/10/2021    SEGSPCT 64 05/01/2013    LYMPHOPCT 8.4 03/10/2021    MONOPCT 14.3 03/10/2021    MYELOPCT 1.0 02/24/2021    BASOPCT 0.0 03/10/2021    MONOSABS 0.61 03/10/2021    LYMPHSABS 0.36 03/10/2021    EOSABS 0.02 03/10/2021    BASOSABS 0.00 03/10/2021     Platelets:    Lab Results   Component Value Date    PLT 53 03/10/2021     BUN/Creatinine:    Lab Results   Component Value Date    BUN 17 02/24/2021    CREATININE 0.9 02/24/2021       ASSESSMENT AND PLAN:  1. Ascites , plan for paracentesis  2. Procedure options, risks and benefits reviewed with patient. Patient expresses understanding.     Electronically signed by Vickie Hamilton II, MD on 3/24/2021 at 9:08 AM

## 2021-04-09 NOTE — PROGRESS NOTES
Verified meds, allergies, history, NPO status. IV established with labs drawn and sent. VS obtained. Patient changed into gown and given warm blanket. Resting in bed at this time.

## 2021-04-09 NOTE — PRE SEDATION
Juan Miguel Schofield II, MD  4/9/2021  11:47 AM        PRE-SEDATION PHYSICIAN ASSESSMENT:      1. HISTORY & PHYSICAL EXAMINATION:  Comments: none    There were no vitals filed for this visit. Allergies: Patient has no known allergies. 2. Heart and Lungs immediately prior to procedure demonstrate no contraindications to proceed      Chief Complaint: <principal problem not specified>    Drug: unknown  Tobacco: unknown    3. PAST ANESTHESIA EXPERIENCE:  unknown. 4. AIRWAY/TEETH/HEAD & NECK(Mallampati Classification):  II (soft palate, uvula, fauces visible)    5: NORMAL RANGE OF MOTION OF NECK: No    6. PATIENT WILL LIKELY TOLERATE PLAN OF MODERATE SEDATION    7. ASA 2.     Gilles Castillo MD

## 2021-04-09 NOTE — INTERVAL H&P NOTE
H&P Update    Patient's History and Physical  was reviewed. The patient appears likely to able to tolerate the procedure. Risk and benefits discussed including ultimate complications, possibly death and consent obtained.     Marco Napoles, II

## 2021-04-09 NOTE — PROGRESS NOTES
Pleurx catheter placed today. 4700 ml of dark hi fluid drained. Patient tolerated the procedure well.

## 2021-04-09 NOTE — POST SEDATION
POST SEDATION NOTE:  Time: 11:47 AM    Cardiopulmonary: Vitals Signs Stable: yes    Level of Consciousness: alert    Reversal Agent Used: No    Complications: none    Follow-up/Observations: none    Pain Score: 1    Suad Quinn MD

## 2021-04-15 PROBLEM — D61.818 PANCYTOPENIA (HCC): Status: ACTIVE | Noted: 2021-01-01

## 2021-04-15 PROBLEM — E87.8 ELECTROLYTE IMBALANCE: Status: ACTIVE | Noted: 2021-01-01

## 2021-04-15 NOTE — ED PROVIDER NOTES
ATTENDING PROVIDER ATTESTATION:     Jovanni Sanchez presented to the emergency department for evaluation of Post-op Problem (Sent in for problems with abdominal drain. )   and was initially evaluated by the Medical Resident. See Original ED Note for H&P and ED course above. I have reviewed and discussed the case, including pertinent history (medical, surgical, family and social) and exam findings with the Medical Resident assigned to Jovanni Sanchez. I have personally performed and/or participated in the history, exam, medical decision making, and procedures and agree with all pertinent clinical information. I, Dr. Mary Moss MD, am the primary provider of this record. I have reviewed my findings and recommendations with the assigned Medical Resident, Jovanni Sanchez and members of family present at the time of disposition. My findings/plan: The primary encounter diagnosis was Hyponatremia. Diagnoses of Malignant neoplasm metastatic to lung, unspecified laterality ([address] Malignant ascites, Lactic acidosis, Liver mass, Hypochloremia, Hyperbilirubinemia, and Thrombocytopenia ([address] were also pertinent to this visit. New Prescriptions    No medications on file     Mary Moss MD    HPI:  Patient is a 60-year-old male with a history of hepatocellular carcinoma who presents today for abdominal distention and concern for postoperative complication. Patient did have IR guided tunneled intraperitoneal Pleurx catheter placed on the ninth of this month for at home paracentesis. Patient states he has been draining about 500 cc of fluid daily. He drained 500 cc this morning without complications. However patient states he has been having some fluid draining from the catheter site this morning and was concerned that this is potentially become dislodged. Patient is complaining of generalized abdominal pain and distention. Pain is not made better or worse by anything specific.   He has had this pain for several days. He is not taking any medication for the symptoms. Pain does not radiate anywhere. Patient is jaundiced on exam.  He has a history of hepatocellular carcinoma, last chemotherapy was 5 or 6 weeks ago. Denies nausea vomiting. Denies chest pain or shortness of breath. Denies fevers or chills. The history is provided by the patient. No  was used. Review of Systems   Constitutional: Negative for chills and fever. HENT: Negative for ear pain, sinus pressure and sore throat. Eyes: Negative for pain, discharge and redness. Respiratory: Negative for cough, shortness of breath and wheezing. Cardiovascular: Negative for chest pain. Gastrointestinal: Positive for abdominal distention and abdominal pain. Negative for diarrhea, nausea and vomiting. Genitourinary: Negative for dysuria and frequency. Musculoskeletal: Negative for arthralgias and back pain. Skin: Positive for color change. Negative for rash and wound. Neurological: Negative for dizziness, weakness and headaches. Hematological: Negative for adenopathy. Psychiatric/Behavioral: Negative for behavioral problems and confusion. All other systems reviewed and are negative. Physical Exam  Vitals signs and nursing note reviewed. Constitutional:       General: He is not in acute distress. Appearance: He is well-developed. He is ill-appearing. Comments: Ill appearing   HENT:      Head: Normocephalic and atraumatic. Nose: Nose normal.      Mouth/Throat:      Mouth: Mucous membranes are moist.   Eyes:      Extraocular Movements: Extraocular movements intact. Pupils: Pupils are equal, round, and reactive to light. Comments: Scleral icterus   Neck:      Musculoskeletal: Normal range of motion and neck supple. Cardiovascular:      Rate and Rhythm: Normal rate and regular rhythm. Pulses: Normal pulses. Heart sounds: Normal heart sounds. No murmur. Pulmonary:      Effort: Pulmonary effort is normal. No respiratory distress. Breath sounds: Normal breath sounds. No wheezing or rales. Abdominal:      General: Bowel sounds are normal. There is distension. Palpations: Abdomen is soft. Tenderness: There is no abdominal tenderness. There is no guarding or rebound. Comments: Mild abdominal distention and generalized tenderness  No guarding or rebound  Pleurx catheter noted to left lower quadrant   Skin:     General: Skin is warm and dry. Coloration: Skin is jaundiced. Comments: Jaundiced  No erythema   Neurological:      General: No focal deficit present. Mental Status: He is alert and oriented to person, place, and time. Cranial Nerves: No cranial nerve deficit. Coordination: Coordination normal.      Comments: Alert and oriented x3  GCS 15   Psychiatric:         Mood and Affect: Mood normal.         Behavior: Behavior normal.          Procedures     Labs Reviewed   CBC WITH AUTO DIFFERENTIAL - Abnormal; Notable for the following components:       Result Value    WBC 3.1 (*)     RBC 3.57 (*)     Hemoglobin 8.7 (*)     Hematocrit 29.9 (*)     MCH 24.4 (*)     MCHC 29.1 (*)     RDW 21.1 (*)     Platelets 93 (*)     Lymphocytes % 9.5 (*)     Monocytes % 16.3 (*)     Lymphocytes Absolute 0.29 (*)     Eosinophils Absolute 0.03 (*)     All other components within normal limits   BASIC METABOLIC PANEL W/ REFLEX TO MG FOR LOW K - Abnormal; Notable for the following components:    Sodium 122 (*)     Chloride 88 (*)     Glucose 139 (*)     CREATININE 0.6 (*)     Calcium 8.3 (*)     All other components within normal limits   HEPATIC FUNCTION PANEL - Abnormal; Notable for the following components:     Total Protein 5.6 (*)     Albumin 2.1 (*)     AST 53 (*)     Total Bilirubin 2.0 (*)     Bilirubin, Direct 1.1 (*)     All other components within normal limits   PROTIME-INR - Abnormal; Notable for the following components: Protime 15.1 (*)     All other components within normal limits   LACTATE, SEPSIS - Abnormal; Notable for the following components:    Lactic Acid, Sepsis 3.3 (*)     All other components within normal limits   LIPASE   PLATELET CONFIRMATION   LACTATE, SEPSIS    Narrative:     Draw gray top tube-place on ice.~Separate plasma-keep cold. CT ABDOMEN PELVIS W IV CONTRAST Additional Contrast? None   Final Result   Interval placement of a drainage catheter in the left lower quadrant/pelvis   with small amount of pneumoperitoneum likely related to procedure. Hepatic cirrhosis with splenomegaly and portal venous hypertension with   venous varices and large amount of ascites. Progressive widespread metastatic malignancy with progressive widespread   pulmonary metastasis, progressive necrotic mass lesion in the right hepatic   lobe and peritoneal carcinomatosis with the extensive mesenteric and pelvic   necrotic masses and omental caking. There may be malignant ascites. There   may be tumor invasion into the right portal vein. Partially distended gallbladder with gallstones and wall thickening which   could be either due to cholecystitis versus ascites. MDM  Number of Diagnoses or Management Options  Diagnosis management comments: Patient is a 60-year-old male presents today for abdominal distention. Physical exam shows patient is ill-appearing, jaundiced. Abdomen is distended and firm. Lab work imaging was obtained. Lab work shows hyperbilirubinemia with a bilirubin of 2.0 elevated from previous. Patient is also hyponatremic with a sodium of 122, however this is likely related to fluid overload from his cirrhosis, therefore patient was not given IV fluids. Patient has lactic acidosis of 3.3. CAT scan of the abdomen was obtained as patient did have recent IR guided tunneled intraperitoneal Pleurx catheter placed in the left abdomen last week.   CT shows hepatic cirrhosis with splenomegaly and large amount of ascites. They also note new widespread metastatic malignancy with spread to the lungs bilaterally. There is also noted a tumor with invasion in the right portal vein. With patient's complex history, lab work and image finding, we recommend admission to the hospital.  PCP was consulted. Amount and/or Complexity of Data Reviewed  Clinical lab tests: reviewed  Tests in the radiology section of CPT®: reviewed  Decide to obtain previous medical records or to obtain history from someone other than the patient: yes                  --------------------------------------------- PAST HISTORY ---------------------------------------------  Past Medical History:  has a past medical history of Cirrhosis ([facility] Esophageal varices ([facility] GI bleed, Hepatitis C, Hepatocellular carcinoma ([facility] and Hypertension. Past Surgical History:  has a past surgical history that includes Leg Surgery; Endoscopy, colon, diagnostic (04/30/2013); Upper gastrointestinal endoscopy (09/02/2016); Upper gastrointestinal endoscopy (03/27/2018); Upper gastrointestinal endoscopy (N/A, 03/27/2018); Upper gastrointestinal endoscopy (N/A, 10/14/2020); CT NEEDLE BIOPSY LIVER PERCUTANEOUS (10/15/2020); IR EMBOLIZATION TUMOR/ORGAN ISCH/INFARC (12/14/2020); IR PORT PLACEMENT > 5 YEARS (12/14/2020); IR EMBOLIZATION TUMOR/ORGAN ISCH/INFARC (12/16/2020); Upper gastrointestinal endoscopy (N/A, 01/18/2021); Paracentesis (Right, 02/08/2021); Paracentesis (Right, 03/24/2021); and IR GUIDED TUNNELED INTRAPERITONEAL CATH W OR WO CONTRAST PERC (4/9/2021). Social History:  reports that he has never smoked. He has never used smokeless tobacco. He reports previous alcohol use. He reports that he does not use drugs. Family History: family history is not on file. The patients home medications have been reviewed. Allergies: Patient has no known allergies.     -------------------------------------------------- RESULTS -------------------------------------------------    LABS:  Results for orders placed or performed during the hospital encounter of 04/15/21   CBC Auto Differential   Result Value Ref Range    WBC 3.1 (L) 4.5 - 11.5 E9/L    RBC 3.57 (L) 3.80 - 5.80 E12/L    Hemoglobin 8.7 (L) 12.5 - 16.5 g/dL    Hematocrit 29.9 (L) 37.0 - 54.0 %    MCV 83.8 80.0 - 99.9 fL    MCH 24.4 (L) 26.0 - 35.0 pg    MCHC 29.1 (L) 32.0 - 34.5 %    RDW 21.1 (H) 11.5 - 15.0 fL    Platelets 93 (L) 804 - 450 E9/L    MPV 10.4 7.0 - 12.0 fL    Neutrophils % 72.2 43.0 - 80.0 %    Immature Granulocytes % 0.7 0.0 - 5.0 %    Lymphocytes % 9.5 (L) 20.0 - 42.0 %    Monocytes % 16.3 (H) 2.0 - 12.0 %    Eosinophils % 1.0 0.0 - 6.0 %    Basophils % 0.3 0.0 - 2.0 %    Neutrophils Absolute 2.21 1.80 - 7.30 E9/L    Immature Granulocytes # 0.02 E9/L    Lymphocytes Absolute 0.29 (L) 1.50 - 4.00 E9/L    Monocytes Absolute 0.50 0.10 - 0.95 E9/L    Eosinophils Absolute 0.03 (L) 0.05 - 0.50 E9/L    Basophils Absolute 0.01 0.00 - 0.20 E9/L    Anisocytosis 2+     Poikilocytes 2+     [place] Cells 2+    Basic Metabolic Panel w/ Reflex to MG   Result Value Ref Range    Sodium 122 (L) 132 - 146 mmol/L    Potassium reflex Magnesium 4.0 3.5 - 5.0 mmol/L    Chloride 88 (L) 98 - 107 mmol/L    CO2 25 22 - 29 mmol/L    Anion Gap 9 7 - 16 mmol/L    Glucose 139 (H) 74 - 99 mg/dL    BUN 21 8 - 23 mg/dL    CREATININE 0.6 (L) 0.7 - 1.2 mg/dL    GFR Non-African American >60 >=60 mL/min/1.73    GFR African American >60     Calcium 8.3 (L) 8.6 - 10.2 mg/dL   Hepatic Function Panel   Result Value Ref Range    Total Protein 5.6 (L) 6.4 - 8.3 g/dL    Albumin 2.1 (L) 3.5 - 5.2 g/dL    Alkaline Phosphatase 87 40 - 129 U/L    ALT 14 0 - 40 U/L    AST 53 (H) 0 - 39 U/L    Total Bilirubin 2.0 (H) 0.0 - 1.2 mg/dL    Bilirubin, Direct 1.1 (H) 0.0 - 0.3 mg/dL    Bilirubin, Indirect 0.9 0.0 - 1.0 mg/dL   Protime-INR   Result Value Ref Range    Protime 15.1 (H) 9.3 - 12.4 sec    INR 1.4    Lactate, Sepsis   Result Value Ref Range    Lactic Acid, Sepsis 3.3 (H) 0.5 - 1.9 mmol/L   Lipase   Result Value Ref Range    Lipase 37 13 - 60 U/L   Platelet Confirmation   Result Value Ref Range    Platelet Confirmation CONFIRMED        RADIOLOGY:  CT ABDOMEN PELVIS W IV CONTRAST Additional Contrast? None   Final Result   Interval placement of a drainage catheter in the left lower quadrant/pelvis   with small amount of pneumoperitoneum likely related to procedure. Hepatic cirrhosis with splenomegaly and portal venous hypertension with   venous varices and large amount of ascites. Progressive widespread metastatic malignancy with progressive widespread   pulmonary metastasis, progressive necrotic mass lesion in the right hepatic   lobe and peritoneal carcinomatosis with the extensive mesenteric and pelvic   necrotic masses and omental caking. There may be malignant ascites. There   may be tumor invasion into the right portal vein. Partially distended gallbladder with gallstones and wall thickening which   could be either due to cholecystitis versus ascites. ------------------------- NURSING NOTES AND VITALS REVIEWED ---------------------------  Date / Time Roomed:  4/15/2021  3:47 PM  ED Bed Assignment:  02/02    The nursing notes within the ED encounter and vital signs as below have been reviewed.      Patient Vitals for the past 24 hrs:   BP Temp Temp src Pulse Resp SpO2 Height Weight   04/15/21 1930 94/61 -- -- -- -- -- -- --   04/15/21 1915 -- 97.9 °F (36.6 °C) Oral -- -- -- -- --   04/15/21 1900 95/63 97.9 °F (36.6 °C) -- 100 16 100 % -- --   04/15/21 1752 (!) 101/59 -- -- -- -- -- -- --   04/15/21 1640 110/76 -- -- -- -- -- -- --   04/15/21 1534 -- -- -- -- -- 97 % -- --   04/15/21 1501 (!) 104/59 98.9 °F (37.2 °C) Oral 110 14 -- 6' (1.829 m) 170 lb (77.1 kg)       Oxygen Saturation Interpretation: Normal    ------------------------------------------ PROGRESS NOTES

## 2021-04-16 PROBLEM — R18.8 ASCITES: Status: ACTIVE | Noted: 2021-01-01

## 2021-04-16 PROBLEM — E80.6 HYPERBILIRUBINEMIA: Status: ACTIVE | Noted: 2021-01-01

## 2021-04-16 PROBLEM — K59.00 CONSTIPATION: Status: ACTIVE | Noted: 2021-01-01

## 2021-04-16 NOTE — CARE COORDINATION
Social Work:    Order for hospice consult received. Social service reviewed chart notes. Patient admitted due to hyponatremia due to a history of liver & lung cancer. Social service met with Angelica Blount, offered support, and explained social service &  roles, as well as discussed discharge planning. Angelica Blount resides independently alone in a 2-story home with 5 entry steps. His bedroom & bathroom are located on the second floor. Angelica Blount advises that he is active with Dr. Shira Carrasquillo at the [facility] and Dr. Juliane Trejo in [facility] explained that he has had one radiation treatment back in December and approximately seven chemo treatments at the [facility]. He has support from his two son's [address] who reside in [facility], as well as [address] [place] [address] who assists with his pleurx drain. Ranulfo's daughter-in-law also assists with needs, as well as supportive friend and neighbors. Social work advised Angelica Blount about the hospice consult and he is not interested in meeting with hospice at this time. Social service briefed Angelica Blount about services he can receive with hospice if he decided not to continue care, however, he states he will take things as they come. Presently, Angelica Blount is going to surgery for evaluation of his pleurx cath due to possible leakage, according to RN. Ranulfo's goal at the moment is to return home with [address] [place] [address] and he also would like a 3-1 commode from [facility] after choices were provided. Social work called a referral to [place] at [facility] today. Elena at [address] [place] [address] is following. She will need notified of discharge day for delivery. Electronically signed by MELISSA Vergara on 4/16/2021 at 11:26 AM     The Plan for Transition of Care is related to the following treatment goals: HHC, DME, SNF, Hospice    The Patient and/or patient representative Hakeem Bhat was provided with a choice of provider and agrees   with the discharge plan.  [x] Yes [] No    Freedom of choice list was provided with basic

## 2021-04-16 NOTE — PROGRESS NOTES
Per IR, pt can be drained at the bedside with PleurX.     Electronically signed by Cyrus Griffin RN on 4/16/2021 at 11:36 AM

## 2021-04-16 NOTE — PROGRESS NOTES
04/16/2021    ALT 14 04/16/2021         Current Facility-Administered Medications:     lactulose (CHRONULAC) 10 GM/15ML solution 20 g, 20 g, Oral, BID, Angel Rias, DO, 20 g at 04/16/21 0926    [START ON 4/17/2021] albumin human 25 % IV solution 75 g, 75 g, Intravenous, Once, JUAN ALBERTO Lopez CNP    influenza A&B vaccine (FLUAD QUADRIVALENT) injection 0.5 mL, 0.5 mL, Intramuscular, Once, Angel Rias, DO    mirtazapine (REMERON) tablet 15 mg, 15 mg, Oral, Nightly, Angel Rias, DO    sennosides-docusate sodium (SENOKOT-S) 8.6-50 MG tablet 2 tablet, 2 tablet, Oral, Daily PRN, Angel Rias, DO    sucralfate (CARAFATE) tablet 1 g, 1 g, Oral, 4x Daily, Angel Rias, DO, 1 g at 04/16/21 1348    traMADol (ULTRAM) tablet 50 mg, 50 mg, Oral, Q8H, Angel Rias, DO, 50 mg at 04/16/21 1348    Assessment:    Active Problems:    Pancytopenia ([address]    Electrolyte imbalance    Ascites    Hyperbilirubinemia    Constipation    Esophageal varices ([address]    Hepatocellular carcinoma ([address]  Resolved Problems:    * No resolved hospital problems. *    69-year-old known to  with Hepatocellular carcinoma. Treated with Y-90 on 12/16/20. Started Tecentriq and Bevacizumab on 12/17/20 with good response initially. Last treatment was on 3/11/21. He presented with increased abdominal distension. He drains his pleurx catheter daily with at least 500cc removed daily. But now new complaints of fluid draining from around the catheter site so he was sent in for evaluation. CT abdomen/pelvis showed interval placement of drainage catheter in the LLQ/pelvis with small amount of pneumoperitoneum likely related to the procedure. Hepatic cirrhosis with splenomegaly and portal venous hypertension with venous varices and large amount of ascites.  Progressive widespread metastatic malignancy with progressive widespread pulmonary metastasis, progressive necrotic mass lesion in the R hepatic lobe and peritoneal carcinomatosis with extensive mesenteric and pelvic necrotic masses and omental caking. Malignant ascites. Partially distended gallbladder with gallstones and wall thickening which could be either due to cholecystitis vs. Ascites. Plan:  -Unfortunately, this gentleman has progression of of his liver cancer which was represented on CT abdomen/pelvis   -Continue daily drainage of the pleurx catheter  -Plan is to start on oral TKI outpatient with medication Lenvatinib. For possible pleurx exchange tomorrow. Discussed with . Continue supportive care at this time. Electronically signed by Jamee Harada, APRN - NP on 4/16/2021 at 2:31 PM     Attending Addendum:     Patient seen and examined personally. Reviewed pertinent labs and imaging reports. Progress note has been updated to reflect my changes. Agree with the note above.       Maru Holcomb MD  Medical Oncologist/Hematologist  [facility] for [address]

## 2021-04-16 NOTE — H&P
mesenteric and pelvic necrotic masses with omental caking. Malignant ascites was suggested with possible tumor invasion in the  right portal vein. Laboratory studies were also performed and  electrolytes were abnormal with a low sodium of 122, chloride 88, BUN  and creatinine were stable at 21 and 0.6 respectively. Lactic acid was  high at 3.3. Bilirubin elevated at 2.0 with ammonia slightly high at  53. CBC was showing pancytopenia with white cells low at 3.1,  hemoglobin low at 8.7, and platelets low at 93. The patient was  ultimately admitted on the basis of his worsening ascites, new  metastatic spread of hepatocellular carcinoma, and multiple electrolyte  abnormalities. PAST MEDICAL HISTORY:  Consists of GI bleeding, liver cirrhosis with  hepatocellular carcinoma, esophageal varices, hepatitis C, and  hypertension. PAST SURGICAL HISTORY:  Right leg surgery, upper and lower endoscopies,  CT-guided liver biopsy, embolization of tumor, IR port placement,  multiple paracenteses, and recently placed intraperitoneal catheter. MEDICATIONS:  Please see chart. ALLERGIES:  No known drug allergies. SOCIAL HISTORY:  The patient had been a drinker of four or more times  per week with at least three her four drinks per session. No  recreational drug abuse noted. History of tobacco abuse. FAMILY HISTORY:  No family history noted. REVIEW OF SYSTEMS:  Negative unless stated in above history, otherwise  noncontributory. PHYSICAL EXAMINATION:  VITAL SIGNS:  Temperature 94, pulse 101, respirations 19, blood pressure  98/55, pulse ox 95% on room air. GENERAL:  A cachectic-appearing middle-aged man, who appears stated age  if not older. He is alert and oriented, answers questions  appropriately, is friendly and willing to cooperate with physical  examination. He is presently in no acute distress. HEENT:  Atraumatic and normocephalic. Pupils are equal, round, and  reactive to light.   Extraocular movements are intact. Nares are patent. External auditory canals are nonerythematous. Pharynx is clear. NECK:  Supple. No bruits. No cervical lymphadenopathy. SKIN:  Pallor with ashy appearance and minimal jaundice. No bruising,  scarring, cyanosis, or erythema. HEART:  Regular rate and rhythm. No murmurs. LUNGS:  Rales in lung bases with diminished airflow in basis. No  wheezing noted. Upper lung fields appear clear. ABDOMEN:  Distended with fluid wave. Bowel sounds are hypoactive. Minimal tenderness to palpation. No rebound, rigidity, or guarding. EXTREMITIES:  Edema primarily in right lower extremity, 1+. Otherwise  range of motion and strength intact. NEUROLOGIC:  Cranial nerves II through XII are grossly intact. No focal  neurologic deficits are noted. MUSCULOSKELETAL:  No excessive lordosis, kyphosis, or scoliosis. No  gross bony or soft tissue asymmetry. No excessive paraspinal muscle  spasm. ASSESSMENT:  1. Hepatocellular carcinoma with widespread metastases. 2.  Esophageal varices. 3.  Hyperbilirubinemia secondary to hepatocellular carcinoma with  widespread metastases. 4.  Pancytopenia secondary to hepatocellular carcinoma with widespread  metastases. 5.  Electrolyte imbalance. 6.  Ascites. 7.  Constipation. PLAN:  The patient has been admitted to general medical floor. Vitals  q.4 hours. Activity ad haider. Is and Os daily. Diet general.  IVs  hep-locked. We will continue routine home medications. Add lactulose  for constipation. We will follow laboratory studies including CMP,  lactic acid, and CBC regularly. I have suggested the possibility of  hospice management in that this patient's cancer seems to have spread  diffusely, although I have also asked for assistance in caring for this  patient from Oncology, Renal, and Gastroenterology. For any further  orders, please see chart.         Heidi Nash DO    D: 04/16/2021 7:16:33       T: 04/16/2021 7:26:13 [email or web address]  Job#: [id number]     Doc#: [id number]    CC:

## 2021-04-16 NOTE — PROGRESS NOTES
Dr. Kelli Sims returned call. Updated on bladder scan. Said to watch patient at this time since he does not have any urinary urgency at this time.

## 2021-04-16 NOTE — PROGRESS NOTES
Patient has not voided since 9 pm last night. Has no feeling of needing to void. Bladder scanned with 358 in bladder at lowest and >580 at the highest. Patient said he does need his paracentesis tube drained soon, so possibly could be picking up that fluid on the bladder scanner. Paged Dr. Eder Mejias just to make him aware.

## 2021-04-16 NOTE — PROGRESS NOTES
IR called requesting for us to drain pleurx at the bedside since they are unable to do para today. I explained that not only did he need a para but also for pleurx to be checked due to leakage. IR nurse said that the radiologist stated the leakage is probably due to the build up of pressure and to see if after draining that helps. Spoke to Zumobi regarding all this and she said she was going to speak to Dr. Kerline Bustos and get back to us.

## 2021-04-16 NOTE — ED NOTES
Report faxed to [address] [facility] [facility] to [facility].   Pt lele @ [facility] [address], [address]  04/15/21 2034

## 2021-04-16 NOTE — CONSULTS
Gastroenterology Consult Note   JUAN ALBERTO Cool NP-C with Huey Paiz M.D. Consult Note        Date of Service: 4/16/2021  Reason for Consult: ascites with metastaitic liver ca  Requesting Physician: Dr. Andres Devine:  Abdominal distention and concerns for post op complications    History Obtained From:  Patient and EMR    HISTORY OF PRESENT ILLNESS:       Arthur Moreno is a 77 y.o. male with significant past medical history of hepatocellular carcinoma with widespread mets to the lungs bilaterally, hepatitis C, alcohol abuse, esophageal varices with banding, cirrhosis of liver, and HTN admitted via ED for abdominal distention and concerns for postop complications of Pleurx catheter placement. Patient reports on 4/9/2021 he did have an IR guided tunnel intraperitoneal Pleurx catheter placed for at home paracentesis. States he has been draining about 500cc of fluid daily. The last 2 mornings patient has been waking up and his shirt has been wet. States his drain has been leaking through the dressing and onto his shirt and was concerned it was not I the correct place. States his abdomen is still distended daily and is causing him discomfort rated 2/10. Reports a decreased appetite related to abdominal distention. Last bowel movement this morning described as brown and formed. Patient denies fever, chills, weight loss, nausea, vomiting, melena, hematochezia, or hematemesis. Admission labs Albumin 2.1, AST 53, bilirubin 2, bilirubin, direct 1.1; total protein 5.6; WBCs 3.1; RBCs 3.57; hemoglobin 8.7; hematocrit 29.9; MCH 24.4; MCHC 29.1; platelet 93; lymphocytes 9.5; sodium 122; chloride 88; creatinine 1.6; lactic acid 3.3; glucose 139. Ct Abdomen Pelvis W Iv Contrast- Interval placement of a drainage catheter in the left lower quadrant/pelvis with small amount of pneumoperitoneum likely related to procedure.  Hepatic cirrhosis with splenomegaly and portal venous hypertension with venous varices and Albumin 1.9; AST 56; bilirubin 1.8; total protein 5; WBCs 2.5; RBCs 3.46; hemoglobin 8.4; hematocrit 28.2; MCH 24.3; MCHC 29.8; platelet 98; sodium 553; chloride 90; creatinine 1.6; lactic acid 2.6; calcium 7.8.     Past Medical History:        Diagnosis Date    Cirrhosis ([facility]     Esophageal varices (HCC)     GI bleed 10/2020    Hepatitis C     Hepatocellular carcinoma ([facility] [facility]     Hypertension      Past Surgical History:        Procedure Laterality Date    CT NEEDLE BIOPSY LIVER PERCUTANEOUS  10/15/2020    CT NEEDLE BIOPSY LIVER PERCUTANEOUS 10/15/2020 [facility] CT    ENDOSCOPY, COLON, DIAGNOSTIC  04/30/2013    IR EMBOLIZATION TUMOR / ORGAN  12/14/2020    IR EMBOLIZATION TUMOR / ORGAN 12/14/2020 Tiara Hoover MD [facility]    IR EMBOLIZATION TUMOR / ORGAN  12/16/2020    IR EMBOLIZATION TUMOR / ORGAN 12/16/2020 Tiara Hoover MD [facility]    IR PORT PLACEMENT EQUAL OR GREATER THAN 5 YEARS  12/14/2020    IR PORT PLACEMENT EQUAL OR GREATER THAN 5 YEARS 12/14/2020 Tiara Hoover MD [facility]    IR TUNNELED INTRAPERITNL CATH W/IMAG  4/9/2021    IR TUNNELED INTRAPERITNL CATH W/IMAG 4/9/2021 Tiara Hoover MD [facility]    LEG SURGERY      right leg    PARACENTESIS Right 02/08/2021    Dr. Roney Anderson II, 4950cc hazy maroon fluid    PARACENTESIS Right 03/24/2021    6100 cc dark serosang drainage    UPPER GASTROINTESTINAL ENDOSCOPY  09/02/2016    Nevarez, nonbleeding esophageal varicies and gastritis    UPPER GASTROINTESTINAL ENDOSCOPY  03/27/2018    UPPER GASTROINTESTINAL ENDOSCOPY N/A 03/27/2018    EGD BAND LIGATION performed by Sabrina Trujillo MD at [facility] N/A 10/14/2020    EGD CONTROL HEMORRHAGE performed by Hilary Orozco MD at [facility] N/A 01/18/2021    EGD ESOPHAGOGASTRODUODENOSCOPY POSSIBLE BANDING performed by Hilary Orozco MD at [facility] ENDOSCOPY Current Medications:    Current Facility-Administered Medications: lactulose (CHRONULAC) 10 GM/15ML solution 20 g, 20 g, Oral, BID  calcium gluconate 1,000 mg in dextrose 5 % 100 mL IVPB, 1,000 mg, Intravenous, Once  influenza A&B vaccine (FLUAD QUADRIVALENT) injection 0.5 mL, 0.5 mL, Intramuscular, Once  mirtazapine (REMERON) tablet 15 mg, 15 mg, Oral, Nightly  sennosides-docusate sodium (SENOKOT-S) 8.6-50 MG tablet 2 tablet, 2 tablet, Oral, Daily PRN  sucralfate (CARAFATE) tablet 1 g, 1 g, Oral, 4x Daily  traMADol (ULTRAM) tablet 50 mg, 50 mg, Oral, Q8H    Allergies:  Patient has no known allergies. Social History:    Tobacco:  Pt denies  Alcohol:  Pt reports 6-8 12oz beers/day \"which is much less I used to drink, used to be at least 12, maybe more a day. \"   Illicit Drugs: Pt denies     Family History: Mother- , pulmonary disease  Father- , dementia  Sister- , overdose  Sister- living, COPD  Sister- living, healthy  Brother-living, arthritis  Children (3) living, healty    REVIEW OF SYSTEMS:    Aside from what was mentioned in the PMH and HPI, essentially unremarkable, all others negative. PHYSICAL EXAM:      Vitals:    /62   Pulse 95   Temp 98 °F (36.7 °C) (Oral)   Resp 16   Ht 6' (1.829 m)   Wt 164 lb 1 oz (74.4 kg)   SpO2 97%   BMI 22.25 kg/m²     CONSTITUTIONAL:  awake, alert, pale, cooperative, no apparent distress, and appears stated age  EYES:  pupils equal, round and reactive to light, sclera anicteric and conjunctiva pale  ENT:  normocephalic, oral pharynx with moist mucous membranes  LUNGS:  clear to auscultation bilaterally.   CARDIOVASCULAR:  regular rate and rhythm, no muurmur noted; 2+ pulses; no edema  ABDOMEN:  normal bowel sounds, distended and taut with positive fluid wave  MUSCULOSKELETAL:  full range of motion noted  motor strength is 5 out of 5 all extremities bilaterally  NEUROLOGIC:  Mental Status Exam:  Level of Alertness: awake  Orientation:   person, place, time  Motor Exam:  Motor exam is symmetrical 5 out of 5 all extremities bilaterally  SKIN:  pale skin color, normal texture, turgor    DATA:    CBC with Differential:    Lab Results   Component Value Date    WBC 2.5 04/16/2021    RBC 3.46 04/16/2021    RBC  09/01/2016      RBC           [id number]    transfused   09/02/16  00:19  SCC    RBC  09/01/2016      RBC           [id number]    transfused   09/02/16  19:47  SCC    HGB 8.4 04/16/2021    HCT 28.2 04/16/2021    PLT 98 04/16/2021    MCV 81.5 04/16/2021    MCH 24.3 04/16/2021    MCHC 29.8 04/16/2021    RDW 21.2 04/16/2021    SEGSPCT 64 05/01/2013    LYMPHOPCT 9.5 04/15/2021    MONOPCT 16.3 04/15/2021    MYELOPCT 1.0 02/24/2021    BASOPCT 0.3 04/15/2021    MONOSABS 0.50 04/15/2021    LYMPHSABS 0.29 04/15/2021    EOSABS 0.03 04/15/2021    BASOSABS 0.01 04/15/2021     CMP:    Lab Results   Component Value Date     04/16/2021    K 4.1 04/16/2021    K 4.0 04/15/2021    CL 90 04/16/2021    CO2 24 04/16/2021    BUN 16 04/16/2021    CREATININE 0.6 04/16/2021    GFRAA >60 04/16/2021    LABGLOM >60 04/16/2021    GLUCOSE 81 04/16/2021    PROT 5.0 04/16/2021    LABALBU 1.9 04/16/2021    CALCIUM 7.8 04/16/2021    BILITOT 1.8 04/16/2021    ALKPHOS 78 04/16/2021    AST 56 04/16/2021    ALT 14 04/16/2021     Hepatic Function Panel:    Lab Results   Component Value Date    ALKPHOS 78 04/16/2021    ALT 14 04/16/2021    AST 56 04/16/2021    PROT 5.0 04/16/2021    BILITOT 1.8 04/16/2021    BILIDIR 1.1 04/15/2021    IBILI 0.9 04/15/2021    LABALBU 1.9 04/16/2021     PT/INR:    Lab Results   Component Value Date    PROTIME 15.1 04/15/2021    INR 1.4 04/15/2021     PTT:    Lab Results   Component Value Date    APTT 33.5 03/10/2021   [APTT}  Last 3 Troponin:    Lab Results   Component Value Date    TROPONINI <0.01 02/24/2021    TROPONINI <0.01 10/13/2020    TROPONINI <0.01 03/26/2018     TSH:    Lab Results   Component Value Date TSH 1.100 09/03/2016     VITAMIN B12:   Lab Results   Component Value Date    RQPOJRJO42 838 09/03/2016     FOLATE:    Lab Results   Component Value Date    FOLATE 14.0 09/03/2016     IRON:    Lab Results   Component Value Date    IRON 31 10/18/2016     Iron Saturation:    Lab Results   Component Value Date    LABIRON 11 10/18/2016     TIBC:    Lab Results   Component Value Date    TIBC 291 10/18/2016     FERRITIN:    Lab Results   Component Value Date    FERRITIN 22 10/18/2016     No components found for: CHLPL  Lab Results   Component Value Date    TRIG 72 04/30/2013     Lab Results   Component Value Date    HDL 78.8 04/30/2013     Lab Results   Component Value Date    LDLCALC 46 04/30/2013     No results found for: LABVLDL     Ct Abdomen Pelvis W Iv Contrast Additional Contrast? None    Result Date: 4/15/2021  EXAMINATION: CT OF THE ABDOMEN AND PELVIS WITH CONTRAST 4/15/2021 6:07 pm TECHNIQUE: CT of the abdomen and pelvis was performed with the administration of intravenous contrast. Multiplanar reformatted images are provided for review. Dose modulation, iterative reconstruction, and/or weight based adjustment of the mA/kV was utilized to reduce the radiation dose to as low as reasonably achievable. COMPARISON: 02/01/2021. HISTORY: ORDERING SYSTEM PROVIDED HISTORY: IR paracentesis drain placed in the left abdomen, history of cirrhosis TECHNOLOGIST PROVIDED HISTORY: Additional Contrast?->None Reason for exam:->IR paracentesis drain placed in the left abdomen, history of cirrhosis Decision Support Exception->Emergency Medical Condition (MA) FINDINGS: The lung bases demonstrate extensive/ innumerable pulmonary nodules and masses with the largest 1 in the left lower lobe measuring 4.2 x 3 cm and the largest 1 in the right lower lobe measuring 3.2 x 2.6 cm with significant progression in the disease. There is atelectasis and pleural effusion in the left lower lobe. Liver is cirrhotic with nodular border.   A previously noted necrotic mass in the right hepatic lobe currently measures 8.3 x 3.3 cm which is somewhat increased in size. There may be tumor invasion with occlusion of the right portal vein which is thrombosed. There is large amount of ascites. Gallbladder is partially distended with gallstones and wall thickening. There is splenomegaly with spleen measuring 19 cm. There is prominence of the portal vein concerning for portal venous hypertension with venous varices at the GE junction and adair of the liver as well as splenic hilum. Pancreas, the adrenals and the kidneys are normal.  There is extensive metastatic nodules in the mesentery with nodules and masses throughout the abdomen pelvis with largest 1 in the abdomen measuring up to 5.5 x 4.1 cm. A large necrotic mass in the pelvis in the rectovesical space measuring 10 x 8 cm with mass effect on the bladder is noted. There is nodularity of the omentum concerning for omental caking/peritoneal carcinomatosis. Subcutaneous mass in the anterior abdominal wall measuring 2.3 x 2.8 cm is noted. Some small retroperitoneal adenopathy is also noted. There is interval placement of a I had catheter in the pelvis with small amount of pneumoperitoneum with air anteriorly likely resultant from the procedure. Pelvis. Bladder is partially distended with the nodularity associated with the bladder wall, probably metastasis to the bladder wall. The previously described the necrotic mass in the pelvis/rectovesical space is noted. Malignant ascites is noted. There is constipation. Appendix is normal.     Interval placement of a drainage catheter in the left lower quadrant/pelvis with small amount of pneumoperitoneum likely related to procedure. Hepatic cirrhosis with splenomegaly and portal venous hypertension with venous varices and large amount of ascites.  Progressive widespread metastatic malignancy with progressive widespread pulmonary metastasis, progressive necrotic mass lesion in the right hepatic lobe and peritoneal carcinomatosis with the extensive mesenteric and pelvic necrotic masses and omental caking. There may be malignant ascites. There may be tumor invasion into the right portal vein. Partially distended gallbladder with gallstones and wall thickening which could be either due to cholecystitis versus ascites. Ir Us Guided Paracentesis    Result Date: 3/24/2021  Patient MRN:  [id number] : 1955 Age: 77 years Gender: Male Order Date:  3/24/2021 8:51 AM EXAM: IR US GUIDED PARACENTESIS NUMBER OF IMAGES:  2 INDICATION: C22.0 Hepatocellular carcinoma ([address] paracentesis What reading provider will be dictating this exam?->[facility] PARACENTESIS: Ultrasound was performed demonstrating ascites. Routine ultrasound and Doppler ultrasound were used. Using direct real-time ultrasound imaging  access was obtained. An image was stored and copy was transmitted to PACS. After obtaining informed consent and after the routine sterile prep and drape and after the administration of a local anesthesia, a system of needles and cannulas was guided by ultrasound control into the peritoneal cavity. Subsequently with real-time guidance a catheter was inserted. Peritoneal access was achieved. The needle was visualized with ultrasound in real-time in position within the peritoneum. The needle was seen within the peritoneum with ultrasound in real-time in position and ascites fluid. The catheter was removed at the end of the procedure. The patient tolerated the procedure well. There were no complications. The patient was released in stable condition. Time out occurred at 09 53 hours. A total of 6100 cc of colored fluid was removed. Successful paracentesis.      Ir Guided Tunneled Intraperitoneal Cath W Or Wo Contrast Perc    Result Date: 2021  NUMBER OF IMAGES: Patient MRN:  [id number] : 1955 Age: 77 years Gender: Male Order Date:  2021 11:37 AM EXAM: IR GUIDED TUNNELED INTRAPERITONEAL CATH W OR WO CONTRAST PERC NUMBER OF IMAGES:  4 INDICATION: C22.0 Hepatocellular carcinoma ([facility] [address] placement of plexus catheter for recurrent ascites What reading provider will be dictating this exam?->[facility] COMPARISON: None Insertion of a permanent peritoneal drainage catheter with tunnel creation: After obtaining informed consent and following the routine sterile prep and drape a combination of ultrasound and fluoroscopy were utilized to place a needle into the peritoneal cavity and ascitic fluid was obtained. Subsequently after the administration of local anesthesia a tunnel tract was created and dilated. Two separate incisions were made. Routine ultrasound and Doppler ultrasound were used. Using direct real-time ultrasound imaging peritoneal access was obtained. An image was stored and copy was transmitted to PACS. Subsequently with real-time guidance a needle was inserted intravascular and through the needle a wire. The needle tip was visualized with real time guidance as it was placed into the peritoneal space. A tunneled catheter was subsequently advanced from the first incision into the second incision and subsequently a catheter was advanced into the peritoneal space. Confirmation of position was confirmed under fluoroscopic control. Time out occurred at 1148 hours. Patient received 1 mg of Versed, 50 mcg of fentanyl and local anesthesia and was monitored for 30 minutes or less by a registered nurse. Patient received 12 seconds fluoroscopy. Subsequently ascitic fluid was drained. The patient tolerated the procedure well. The skin was sutured with 3-0 nylon. The drain was sutured in place. The cuff was placed appropriately. Successful uncomplicated placement of a Pleurx catheter for a permanent peritoneal catheter drainage.        IMPRESSION:  · Malignant ascites  · Abdominal distention  · Elevated LFTs, secondary to cirrhosis and liver CA  · Hepatocellular carcinoma with mets to the lung-oncology following  · Anemia, normocytic, hypochromic  · Thrombocytopenia  · Hx esophageal varices    RECOMMENDATIONS:    · Drain ascites via plurex catheter, 5L today at bedside with SPA as ordered  · Possible IR paracentesis with cytology for possible removal/ replacement of pleurx catheter if still draining  · Occult stool, please call me with results  · Continue lactulose as ordered  · Continue Carafate as ordered  · Continue general diet as ordered  · Monitor CBC, CMP, ammonia and INR daily  · Supportive care  · Continue to monitor    Note: This report was completed utilizing computer voice recognition software. Every effort has been made to ensure accuracy, however; inadvertent computerized transcription errors may be present. Thank you very much for your consultation. We will follow closely with you. Discussed with Dr. Kory Hernandez developed by Dr. Naomy Velazco, APRN, NP-C 4/16/2021 9:55 AM for Dr. Taco Golden. I HAD A FACE TO FACE ENCOUNTER WITH THE PATIENT. AGREE WITH THE EXAM, ASSESSMENT, AND PLAN AS OUTLINED ABOVE. ADDITION AND CORRECTIONS WERE DONE AS DEEMED APPROPRIATE. MY EXAM AND PLAN INCLUDE:   TENSE ABDOMEN WITH ASCITES. WILL ARRANGE FOR LARGE VOLUME PARACENTESIS WHILE IN HOUSE WITH [facility] INFUSION. WILL OBSERVE SITE OF PLUREX AND IF STILL LEAKING WILL HAVE IR EXCHANGE.      Xavi Ramírez M.D. 4/16/2021 2:03 PM

## 2021-04-16 NOTE — PROGRESS NOTES
Notified Dr. Wendy Coleman via answering service of GI consult ordered. Also notified Dr. Neftali Gonzalez of nephrology consult via [address] [place] [address] with new orders received for urine labs.

## 2021-04-16 NOTE — PROGRESS NOTES
IR request presented to Dr. Luis Rucker. Per Dr. Luis Rucker pt can be drained on floor using abdominal pleurx already in place. Call placed to floor nurse, Aneta, to update, understanding voiced.

## 2021-04-16 NOTE — PATIENT CARE CONFERENCE
[facility] Quality Flow/Interdisciplinary Rounds Progress Note        Quality Flow Rounds held on April 16, 2021    Disciplines Attending:  Bedside Nurse, ,  and Nursing Unit Leadership    Frantz Vela was admitted on 4/15/2021  3:47 PM    Anticipated Discharge Date:  Expected Discharge Date: 04/18/21    Disposition:    Misha Score:  Misha Scale Score: 19    Readmission Risk              Risk of Unplanned Readmission:        23           Discussed patient goal for the day, patient clinical progression, and barriers to discharge.   The following Goal(s) of the Day/Commitment(s) have been identified:  Diagnostics - Report Results      Francisco Garcia  April 16, 2021

## 2021-04-16 NOTE — PROGRESS NOTES
Ascites drained using PleurX per GI order at the bedside. Ascitic fluid sent to lab for cytology. 5 L of reddish brown fluid pulled from pt. Vitals performed before and after. Albumin given during as ordered. PleurX did not drain from entry site continuously. It only leaked one drop of fluid once. Pt. Tolerated well.     Electronically signed by Sharon Penn RN on 4/16/2021 at 3:45 PM

## 2021-04-16 NOTE — CONSULTS
Nephrology Consult Note    Patient's Name: Reji Pascual  10:51 AM  4/16/2021    Nephrologist: Dr. Brigida Morales MD    Reason for Consult:  renal is consulted for hyponatremia   Requesting Physician:  Alayna Ventura DO    Chief Complaint: Abdominal distention with ascites and abdominal pain    History Obtained From: Patient and EMR  History of Present Ilness:    Reji Pascual is a 77 y.o. male with with past medical history of metastatic hepatocellular carcinoma with peritoneal carcinomatosis, recurrent ascites needing paracentesis, recently had an abdominal catheter placed for drainage at home, presented with chief complaints of increased abdominal distention and pain. Labs done on admission showed a sodium of 122, chloride of 88, BUN of 21 with a creatinine of 0.6, we are consulted for hyponatremia    Patient states that he was not eating much secondary to increasing abdominal distention for few weeks. He follows a strict no salt diet, he admits to drinking plenty of water throughout the day, his blood pressure is low, no pertinent home medications.   There is no evidence of any altered mental status, seizure activity, instability or falls  Past Medical History:   Diagnosis Date    Cirrhosis ([address]     Esophageal varices (HCC)     GI bleed 10/2020    Hepatitis C     Hepatocellular carcinoma ([address]     Hypertension        Past Surgical History:   Procedure Laterality Date    CT NEEDLE BIOPSY LIVER PERCUTANEOUS  10/15/2020    CT NEEDLE BIOPSY LIVER PERCUTANEOUS 10/15/2020 [facility] CT    ENDOSCOPY, COLON, DIAGNOSTIC  04/30/2013    IR EMBOLIZATION TUMOR / ORGAN  12/14/2020    IR EMBOLIZATION TUMOR / ORGAN 12/14/2020 Gillian Chang MD [facility]    IR EMBOLIZATION TUMOR / ORGAN  12/16/2020    IR EMBOLIZATION TUMOR / ORGAN 12/16/2020 Gillian Chang MD [facility]    IR PORT PLACEMENT EQUAL OR GREATER THAN 5 YEARS  12/14/2020    IR PORT PLACEMENT EQUAL OR GREATER THAN 5 YEARS 12/14/2020 Greg Orlando MD [facility]    IR TUNNELED INTRAPERITNL CATH W/IMAG  4/9/2021    IR TUNNELED INTRAPERITNL CATH W/IMAG 4/9/2021 Greg Orlando MD [facility]    LEG SURGERY      right leg    PARACENTESIS Right 02/08/2021    Dr. Andrew Baldwin II, 4950cc hazy maroon fluid    PARACENTESIS Right 03/24/2021    6100 cc dark serosang drainage    UPPER GASTROINTESTINAL ENDOSCOPY  09/02/2016    [place], nonbleeding esophageal varicies and gastritis    UPPER GASTROINTESTINAL ENDOSCOPY  03/27/2018    UPPER GASTROINTESTINAL ENDOSCOPY N/A 03/27/2018    EGD BAND LIGATION performed by Miri Pardo MD at [address] N/A 10/14/2020    EGD CONTROL HEMORRHAGE performed by Virgie Ayon MD at [address] N/A 01/18/2021    EGD ESOPHAGOGASTRODUODENOSCOPY POSSIBLE BANDING performed by Virgie Ayon MD at [facility] ENDOSCOPY       History reviewed. No pertinent family history. reports that he has never smoked. He has never used smokeless tobacco. He reports previous alcohol use. He reports that he does not use drugs. Allergies:  Patient has no known allergies.     Current Medications:    lactulose (CHRONULAC) 10 GM/15ML solution 20 g, BID  calcium gluconate 1,000 mg in dextrose 5 % 100 mL IVPB, Once  [START ON 4/17/2021] albumin human 25 % IV solution 75 g, See Admin Instructions  influenza A&B vaccine (FLUAD QUADRIVALENT) injection 0.5 mL, Once  mirtazapine (REMERON) tablet 15 mg, Nightly  sennosides-docusate sodium (SENOKOT-S) 8.6-50 MG tablet 2 tablet, Daily PRN  sucralfate (CARAFATE) tablet 1 g, 4x Daily  traMADol (ULTRAM) tablet 50 mg, Q8H        Review of Systems:   10 point review of system done at this time is negative except for the things mentioned in the HPI    Physical exam:   Constitutional:    Vitals: /62   Pulse 95   Temp 98 °F (36.7 °C) (Oral)   Resp 16   Ht 6' (1.829 m)   Wt 164 lb 1 oz (74.4 kg)   SpO2 97%   BMI 22.25 kg/m²      Cachectic looking  male, lying in bed, appears in no acute distress, alert awake and oriented x3  Skin: no rash, turgor wnl  Heent:  eomi, mmm  Neck: no bruits or jvd noted  Cardiovascular:  S1, S2 without m/r/g  Respiratory: CTA B without w/r/r  Abdomen:  +bs, soft, abdomen is diffusely distended, tender, abdominal catheter in place   Ext: No lower extremity edema  Psychiatric: mood and affect appropriate  Musculoskeletal:  Rom, muscular strength intact    Data:   Labs:  CBC:   Lab Results   Component Value Date    WBC 2.5 04/16/2021    RBC 3.46 04/16/2021    RBC  09/01/2016      RBC           [id number]    transfused   09/02/16  00:19  SCC    RBC  09/01/2016      RBC           [id number]    transfused   09/02/16  19:47  SCC    HGB 8.4 04/16/2021    HCT 28.2 04/16/2021    MCV 81.5 04/16/2021    MCH 24.3 04/16/2021    MCHC 29.8 04/16/2021    RDW 21.2 04/16/2021    PLT 98 04/16/2021    MPV 9.7 04/16/2021     CMP:    Lab Results   Component Value Date     04/16/2021    K 4.1 04/16/2021    K 4.0 04/15/2021    CL 90 04/16/2021    CO2 24 04/16/2021    BUN 16 04/16/2021    CREATININE 0.6 04/16/2021    GFRAA >60 04/16/2021    LABGLOM >60 04/16/2021    GLUCOSE 81 04/16/2021    PROT 5.0 04/16/2021    LABALBU 1.9 04/16/2021    CALCIUM 7.8 04/16/2021    BILITOT 1.8 04/16/2021    ALKPHOS 78 04/16/2021    AST 56 04/16/2021    ALT 14 04/16/2021     BMP:    Lab Results   Component Value Date     04/16/2021    K 4.1 04/16/2021    K 4.0 04/15/2021    CL 90 04/16/2021    CO2 24 04/16/2021    BUN 16 04/16/2021    LABALBU 1.9 04/16/2021    CREATININE 0.6 04/16/2021    CALCIUM 7.8 04/16/2021    GFRAA >60 04/16/2021    LABGLOM >60 04/16/2021    GLUCOSE 81 04/16/2021     Calcium:    Lab Results   Component Value Date    CALCIUM 7.8 04/16/2021     Phosphorus:    Lab Results   Component Value Date    PHOS 3.5 10/18/2016     Uric Acid:  No results found for: URICACID  U/A:  No results found for: NITRU, COLORU, PHUR, LABCAST, WBCUA, RBCUA, MUCUS, TRICHOMONAS, YEAST, BACTERIA, CLARITYU, SPECGRAV, LEUKOCYTESUR, UROBILINOGEN, BILIRUBINUR, BLOODU, GLUCOSEU, KETUA, AMORPHOUS     Imaging:  Ct Abdomen Pelvis W Iv Contrast Additional Contrast? None    Result Date: 4/15/2021  EXAMINATION: CT OF THE ABDOMEN AND PELVIS WITH CONTRAST 4/15/2021 6:07 pm TECHNIQUE: CT of the abdomen and pelvis was performed with the administration of intravenous contrast. Multiplanar reformatted images are provided for review. Dose modulation, iterative reconstruction, and/or weight based adjustment of the mA/kV was utilized to reduce the radiation dose to as low as reasonably achievable. COMPARISON: 02/01/2021. HISTORY: ORDERING SYSTEM PROVIDED HISTORY: IR paracentesis drain placed in the left abdomen, history of cirrhosis TECHNOLOGIST PROVIDED HISTORY: Additional Contrast?->None Reason for exam:->IR paracentesis drain placed in the left abdomen, history of cirrhosis Decision Support Exception->Emergency Medical Condition (MA) FINDINGS: The lung bases demonstrate extensive/ innumerable pulmonary nodules and masses with the largest 1 in the left lower lobe measuring 4.2 x 3 cm and the largest 1 in the right lower lobe measuring 3.2 x 2.6 cm with significant progression in the disease. There is atelectasis and pleural effusion in the left lower lobe. Liver is cirrhotic with nodular border. A previously noted necrotic mass in the right hepatic lobe currently measures 8.3 x 3.3 cm which is somewhat increased in size. There may be tumor invasion with occlusion of the right portal vein which is thrombosed. There is large amount of ascites. Gallbladder is partially distended with gallstones and wall thickening. There is splenomegaly with spleen measuring 19 cm.   There is prominence of the portal vein concerning for portal venous hypertension with venous varices at the GE junction and adair of the liver EXAM: IR US GUIDED PARACENTESIS NUMBER OF IMAGES:  2 INDICATION: C22.0 Hepatocellular carcinoma ([address] paracentesis What reading provider will be dictating this exam?->[facility] PARACENTESIS: Ultrasound was performed demonstrating ascites. Routine ultrasound and Doppler ultrasound were used. Using direct real-time ultrasound imaging  access was obtained. An image was stored and copy was transmitted to PACS. After obtaining informed consent and after the routine sterile prep and drape and after the administration of a local anesthesia, a system of needles and cannulas was guided by ultrasound control into the peritoneal cavity. Subsequently with real-time guidance a catheter was inserted. Peritoneal access was achieved. The needle was visualized with ultrasound in real-time in position within the peritoneum. The needle was seen within the peritoneum with ultrasound in real-time in position and ascites fluid. The catheter was removed at the end of the procedure. The patient tolerated the procedure well. There were no complications. The patient was released in stable condition. Time out occurred at 09 53 hours. A total of 6100 cc of colored fluid was removed. Successful paracentesis.      Ir Guided Tunneled Intraperitoneal Cath W Or Wo Contrast Perc    Result Date: 2021  NUMBER OF IMAGES: Patient MRN:  [id number] : 1955 Age: 77 years Gender: Male Order Date:  2021 11:37 AM EXAM: IR GUIDED TUNNELED INTRAPERITONEAL CATH W OR WO CONTRAST PERC NUMBER OF IMAGES:  4 INDICATION: C22.0 Hepatocellular carcinoma ([address] placement of plexus catheter for recurrent ascites What reading provider will be dictating this exam?->[facility] COMPARISON: None Insertion of a permanent peritoneal drainage catheter with tunnel creation: After obtaining informed consent and following the routine sterile prep and drape a combination of ultrasound and fluoroscopy were utilized to place a needle into the peritoneal cavity and ascitic fluid was obtained. Subsequently after the administration of local anesthesia a tunnel tract was created and dilated. Two separate incisions were made. Routine ultrasound and Doppler ultrasound were used. Using direct real-time ultrasound imaging peritoneal access was obtained. An image was stored and copy was transmitted to PACS. Subsequently with real-time guidance a needle was inserted intravascular and through the needle a wire. The needle tip was visualized with real time guidance as it was placed into the peritoneal space. A tunneled catheter was subsequently advanced from the first incision into the second incision and subsequently a catheter was advanced into the peritoneal space. Confirmation of position was confirmed under fluoroscopic control. Time out occurred at 1148 hours. Patient received 1 mg of Versed, 50 mcg of fentanyl and local anesthesia and was monitored for 30 minutes or less by a registered nurse. Patient received 12 seconds fluoroscopy. Subsequently ascitic fluid was drained. The patient tolerated the procedure well. The skin was sutured with 3-0 nylon. The drain was sutured in place. The cuff was placed appropriately. Successful uncomplicated placement of a Pleurx catheter for a permanent peritoneal catheter drainage. Assessment  1. Hypotonic hyponatremia likely secondary to poor solute intake, excess free water intake. He does not have any neurological symptoms at this time  2. Metastatic hepatocellular carcinoma with possible peritoneal carcinomatosis  3. Severe hypoalbuminemia from liver disease  4. Malignant ascites needing daily drainage  5. Anemia, multifactorial from underlying malignancy, chronic disease    Plan  1. He does not have any neurological symptoms needing 3% saline infusion at this time  2. I counseled him on limiting free water intake, start strict 1.2 L fluid restriction, 2 to 3 g sodium diet, encouraged increasing protein in the diet  3.  Obtain urine for random electrolytes, creatinine, urea and osmolality  4. Volume expand with albumin 25 g IV twice daily for 2 doses  5.  Repeat BMP at 7 PM tonight      Thank you Dr. Margie Laureano DO for allowing us to participate in care of Lyle Gómez           Electronically signed by Jeanen Winter MD on 4/16/2021 at 10:51 AM

## 2021-04-17 NOTE — PROGRESS NOTES
Nephrology Progress Note      Events Reviewed. Subjective: We are following Mr. Mirela Love for hyponatremia.      Current Medications:    0.9 % sodium chloride infusion, PRN  Tbo-Filgrastim (GRANIX) injection 300 mcg, Once  lactulose (CHRONULAC) 10 GM/15ML solution 20 g, BID  influenza A&B vaccine (FLUAD QUADRIVALENT) injection 0.5 mL, Once  mirtazapine (REMERON) tablet 15 mg, Nightly  sennosides-docusate sodium (SENOKOT-S) 8.6-50 MG tablet 2 tablet, Daily PRN  sucralfate (CARAFATE) tablet 1 g, 4x Daily  traMADol (ULTRAM) tablet 50 mg, Q8H      Physical exam:   Constitutional:    Vitals: BP (!) 104/58 Comment: manual; nurse notified   Pulse 107   Temp 98.4 °F (36.9 °C) (Oral)   Resp 16   Ht 6' (1.829 m)   Wt 156 lb (70.8 kg)   SpO2 98%   BMI 21.16 kg/m²      Cachectic looking  male, lying in bed, appears in no acute distress, alert awake and oriented x3  Skin: no rash, turgor wnl  Heent:  eomi, mmm  Neck: no bruits or jvd noted  Cardiovascular:  S1, S2 without m/r/g  Respiratory: CTA B without w/r/r  Abdomen:  +bs, soft, abdomen is diffusely distended, tender, abdominal catheter in place   Ext: No lower extremity edema  Psychiatric: mood and affect appropriate  Musculoskeletal:  Rom, muscular strength intact    Data:   Labs:  CBC:   Lab Results   Component Value Date    WBC 1.8 04/17/2021    RBC 2.96 04/17/2021    RBC  09/01/2016      RBC           [id number]    transfused   09/02/16  00:19  SCC    RBC  09/01/2016      RBC           [id number]    transfused   09/02/16  19:47  SCC    HGB 7.2 04/17/2021    HCT 23.5 04/17/2021    MCV 79.4 04/17/2021    MCH 24.3 04/17/2021    MCHC 30.6 04/17/2021    RDW 20.8 04/17/2021    PLT 73 04/17/2021    MPV 9.4 04/17/2021     CMP:    Lab Results   Component Value Date     04/17/2021    K 4.3 04/17/2021    K 4.0 04/15/2021    CL 90 04/17/2021    CO2 27 04/17/2021    BUN 16 04/17/2021    CREATININE 0.6 04/17/2021    GFRAA >60 04/17/2021 LABGLOM >60 04/17/2021    GLUCOSE 98 04/17/2021    PROT 5.5 04/17/2021    LABALBU 3.1 04/17/2021    CALCIUM 8.9 04/17/2021    BILITOT 2.5 04/17/2021    ALKPHOS 68 04/17/2021    AST 42 04/17/2021    ALT 10 04/17/2021     BMP:    Lab Results   Component Value Date     04/17/2021    K 4.3 04/17/2021    K 4.0 04/15/2021    CL 90 04/17/2021    CO2 27 04/17/2021    BUN 16 04/17/2021    LABALBU 3.1 04/17/2021    CREATININE 0.6 04/17/2021    CALCIUM 8.9 04/17/2021    GFRAA >60 04/17/2021    LABGLOM >60 04/17/2021    GLUCOSE 98 04/17/2021     Calcium:    Lab Results   Component Value Date    CALCIUM 8.9 04/17/2021     Phosphorus:    Lab Results   Component Value Date    PHOS 3.5 10/18/2016     Uric Acid:  No results found for: URICACID  U/A:  No results found for: Kristy Welch, PHUR, LABCAST, [address], RBCUA, MUCUS, TRICHOMONAS, YEAST, BACTERIA, CLARITYU, SPECGRAV, LEUKOCYTESUR, UROBILINOGEN, [place], [address] [place]qui, AMORPHOUS     Results for Derrell Peña (MRN [id number]) as of 4/17/2021 13:28   Ref. Range 4/16/2021 17:28   Chloride Latest Ref Range: Not Established mmol/L <20   Creatinine, Ur Latest Ref Range: 40 - 278 mg/dL 146   Osmolality, Ur Latest Ref Range: 300 - 900 mOsm/kg 823   Potassium, Ur Latest Ref Range: Not Established mmol/L 84.1   Sodium, Ur Latest Ref Range: Not Established mmol/L <20   Urea Nitrogen, Ur Latest Ref Range: 800 - 1666 mg/dL 1126       Imaging:  Ct Abdomen Pelvis W Iv Contrast Additional Contrast? None    Result Date: 4/15/2021  EXAMINATION: CT OF THE ABDOMEN AND PELVIS WITH CONTRAST 4/15/2021 6:07 pm TECHNIQUE: CT of the abdomen and pelvis was performed with the administration of intravenous contrast. Multiplanar reformatted images are provided for review. Dose modulation, iterative reconstruction, and/or weight based adjustment of the mA/kV was utilized to reduce the radiation dose to as low as reasonably achievable. COMPARISON: 02/01/2021.  HISTORY: ORDERING SYSTEM PROVIDED HISTORY: IR paracentesis drain placed in the left abdomen, history of cirrhosis TECHNOLOGIST PROVIDED HISTORY: Additional Contrast?->None Reason for exam:->IR paracentesis drain placed in the left abdomen, history of cirrhosis Decision Support Exception->Emergency Medical Condition (MA) FINDINGS: The lung bases demonstrate extensive/ innumerable pulmonary nodules and masses with the largest 1 in the left lower lobe measuring 4.2 x 3 cm and the largest 1 in the right lower lobe measuring 3.2 x 2.6 cm with significant progression in the disease. There is atelectasis and pleural effusion in the left lower lobe. Liver is cirrhotic with nodular border. A previously noted necrotic mass in the right hepatic lobe currently measures 8.3 x 3.3 cm which is somewhat increased in size. There may be tumor invasion with occlusion of the right portal vein which is thrombosed. There is large amount of ascites. Gallbladder is partially distended with gallstones and wall thickening. There is splenomegaly with spleen measuring 19 cm. There is prominence of the portal vein concerning for portal venous hypertension with venous varices at the GE junction and adair of the liver as well as splenic hilum. Pancreas, the adrenals and the kidneys are normal.  There is extensive metastatic nodules in the mesentery with nodules and masses throughout the abdomen pelvis with largest 1 in the abdomen measuring up to 5.5 x 4.1 cm. A large necrotic mass in the pelvis in the rectovesical space measuring 10 x 8 cm with mass effect on the bladder is noted. There is nodularity of the omentum concerning for omental caking/peritoneal carcinomatosis. Subcutaneous mass in the anterior abdominal wall measuring 2.3 x 2.8 cm is noted. Some small retroperitoneal adenopathy is also noted. There is interval placement of a I had catheter in the pelvis with small amount of pneumoperitoneum with air anteriorly likely resultant from the procedure. Pelvis. Bladder is partially distended with the nodularity associated with the bladder wall, probably metastasis to the bladder wall. The previously described the necrotic mass in the pelvis/rectovesical space is noted. Malignant ascites is noted. There is constipation. Appendix is normal.     Interval placement of a drainage catheter in the left lower quadrant/pelvis with small amount of pneumoperitoneum likely related to procedure. Hepatic cirrhosis with splenomegaly and portal venous hypertension with venous varices and large amount of ascites. Progressive widespread metastatic malignancy with progressive widespread pulmonary metastasis, progressive necrotic mass lesion in the right hepatic lobe and peritoneal carcinomatosis with the extensive mesenteric and pelvic necrotic masses and omental caking. There may be malignant ascites. There may be tumor invasion into the right portal vein. Partially distended gallbladder with gallstones and wall thickening which could be either due to cholecystitis versus ascites. Ir Us Guided Paracentesis    Result Date: 3/24/2021  Patient MRN:  [id number] : 1955 Age: 77 years Gender: Male Order Date:  3/24/2021 8:51 AM EXAM: IR US GUIDED PARACENTESIS NUMBER OF IMAGES:  2 INDICATION: C22.0 Hepatocellular carcinoma ([address] paracentesis What reading provider will be dictating this exam?->[facility] PARACENTESIS: Ultrasound was performed demonstrating ascites. Routine ultrasound and Doppler ultrasound were used. Using direct real-time ultrasound imaging  access was obtained. An image was stored and copy was transmitted to PACS. After obtaining informed consent and after the routine sterile prep and drape and after the administration of a local anesthesia, a system of needles and cannulas was guided by ultrasound control into the peritoneal cavity. Subsequently with real-time guidance a catheter was inserted. Peritoneal access was achieved.  The needle was visualized with ultrasound in real-time in position within the peritoneum. The needle was seen within the peritoneum with ultrasound in real-time in position and ascites fluid. The catheter was removed at the end of the procedure. The patient tolerated the procedure well. There were no complications. The patient was released in stable condition. Time out occurred at 09 53 hours. A total of 6100 cc of colored fluid was removed. Successful paracentesis. Ir Guided Tunneled Intraperitoneal Cath W Or Wo Contrast Perc    Result Date: 2021  NUMBER OF IMAGES: Patient MRN:  [id number] : 1955 Age: 77 years Gender: Male Order Date:  2021 11:37 AM EXAM: IR GUIDED TUNNELED INTRAPERITONEAL CATH W OR WO CONTRAST PERC NUMBER OF IMAGES:  4 INDICATION: C22.0 Hepatocellular carcinoma ([address] placement of plexus catheter for recurrent ascites What reading provider will be dictating this exam?->[facility] COMPARISON: None Insertion of a permanent peritoneal drainage catheter with tunnel creation: After obtaining informed consent and following the routine sterile prep and drape a combination of ultrasound and fluoroscopy were utilized to place a needle into the peritoneal cavity and ascitic fluid was obtained. Subsequently after the administration of local anesthesia a tunnel tract was created and dilated. Two separate incisions were made. Routine ultrasound and Doppler ultrasound were used. Using direct real-time ultrasound imaging peritoneal access was obtained. An image was stored and copy was transmitted to PACS. Subsequently with real-time guidance a needle was inserted intravascular and through the needle a wire. The needle tip was visualized with real time guidance as it was placed into the peritoneal space. A tunneled catheter was subsequently advanced from the first incision into the second incision and subsequently a catheter was advanced into the peritoneal space. Confirmation of position was confirmed under fluoroscopic control. Time out occurred at 1148 hours. Patient received 1 mg of Versed, 50 mcg of fentanyl and local anesthesia and was monitored for 30 minutes or less by a registered nurse. Patient received 12 seconds fluoroscopy. Subsequently ascitic fluid was drained. The patient tolerated the procedure well. The skin was sutured with 3-0 nylon. The drain was sutured in place. The cuff was placed appropriately. Successful uncomplicated placement of a Pleurx catheter for a permanent peritoneal catheter drainage. Assessment  1. Hypotonic hyponatremia likely secondary to poor solute intake, excess free water intake. He does not have any neurological symptoms at this time  2. Metastatic hepatocellular carcinoma with possible peritoneal carcinomatosis  3. Severe hypoalbuminemia from liver disease  4. Malignant ascites needing daily drainage, 5 L removed and Albumin Given  5. Anemia, multifactorial from underlying malignancy, chronic disease    Plan  1. He does not have any neurological symptoms needing 3% saline infusion at this time  2. Continue  strict 1.2 L fluid restriction, 2 to 3 g sodium diet, encouraged increasing protein in the diet  3. Check sodium level at 1800. Call If sodium is <125 or >130  4.  Continue to monitor sodium level       Electronically signed by JUAN ALBERTO Rodriguez CNP on 4/17/2021 at 1:26 PM

## 2021-04-17 NOTE — PROGRESS NOTES
PROGRESS NOTE    Patient Presents with/Seen in Consultation For      *ascites with metastaitic liver ca  CHIEF COMPLAINT:  Abdominal distention and concerns for post op complications    Subjective:     Patient reports pain at Pleurx catheter site and throughout entire abdomen described as aching/toothache type pain 8/10. Ascites fluid results reviewed - neutrophils 65 however fluid is cloudy. Patient reports to brown stools, soft. He states his appetite has improved. He denies nausea or vomiting. Review of Systems  Aside from what was mentioned in the PMH and HPI, essentially unremarkable, all others negative. Objective:     BP (!) 104/58 Comment: manual; nurse notified   Pulse 107   Temp 98.4 °F (36.9 °C) (Oral)   Resp 16   Ht 6' (1.829 m)   Wt 156 lb (70.8 kg)   SpO2 98%   BMI 21.16 kg/m²     General appearance: alert, awake, pale, thin, ill appearing, laying in bed, and cooperative  Eyes: conjunctiva pale, sclera anicteric. PERRL.   Lungs: diminished to auscultation bilaterally  Heart: regular rate and rhythm, no murmur, 2+ pulses; no edema  Abdomen: tense, distended, diffusely tender to palpation without guarding or rebound; + ascites, PleurX dressing saturated with bloody drainage with puddling tape region; bowel sounds normal; no masses,  no organomegaly  Extremities: extremities without edema  Pulses: 2+ and symmetric  Skin: Skin color pale, texture, turgor normal.   Neurologic: Grossly normal    0.9 % sodium chloride infusion, PRN  Tbo-Filgrastim (GRANIX) injection 300 mcg, Once  cefTRIAXone (ROCEPHIN) 1,000 mg in sterile water 10 mL IV syringe, Q24H  lactulose (CHRONULAC) 10 GM/15ML solution 20 g, BID  influenza A&B vaccine (FLUAD QUADRIVALENT) injection 0.5 mL, Once  mirtazapine (REMERON) tablet 15 mg, Nightly  sennosides-docusate sodium (SENOKOT-S) 8.6-50 MG tablet 2 tablet, Daily PRN  sucralfate (CARAFATE) tablet 1 g, 4x Daily  traMADol (ULTRAM) tablet 50 mg, Q8H         Data Review  CBC: leaking around site  ? Elevated LFTs, secondary to cirrhosis and liver CA  ? Cirrhosis of liver,   ? Hyperammonemia  ? Anemia, normocytic, hypochromic  ? Constipation  ? Thrombocytopenia  ? Venous varices and portal vein hypertension  ? Hyponatremia - renal consulted  ? EGD with banding 1/18/21-One large varix in the esophagus, estimated to be grade 4/4 with stigmata of pending bleeding, banded with two bands. Stomach showed mild gastritis. No gastric varices. Duodenum unremarkable. ? S/P large volume ascites drainage via PleurX for 5 liters reddish brown fluid 4/16/21    Plan:     ? Drain PleurX for 750 ml daily  ? Possible IR PleurX cath exchange - IR to assess  ? Occult stool, please call me with results  ? Continue lactulose as ordered  ? Continue Carafate as ordered  ? Continue general diet as ordered  ? Monitor CBC, CMP, ammonia and INR daily  ? Supportive care  ? Continue to monitor    Note: This report was completed utilizing computer voice recognition software. Every effort has been made to ensure accuracy, however; inadvertent computerized transcription errors may be present.      Discussed with Dr. Raudel Wahl per Dr. Tanika Piper [id number], FNP-BC 4/17/2021 2:49 PM For Dr. Allison Donnelly

## 2021-04-17 NOTE — PROGRESS NOTES
28.2* 23.5*   MCV 83.8 81.5 79.4*   MCH 24.4* 24.3* 24.3*   MCHC 29.1* 29.8* 30.6*   RDW 21.1* 21.2* 20.8*   PLT 93* 98* 73*   MPV 10.4 9.7 9.4       Micro:  No components found for: [facility])    Radiology:   Ct Abdomen Pelvis W Iv Contrast Additional Contrast? None    Result Date: 4/15/2021  EXAMINATION: CT OF THE ABDOMEN AND PELVIS WITH CONTRAST 4/15/2021 6:07 pm TECHNIQUE: CT of the abdomen and pelvis was performed with the administration of intravenous contrast. Multiplanar reformatted images are provided for review. Dose modulation, iterative reconstruction, and/or weight based adjustment of the mA/kV was utilized to reduce the radiation dose to as low as reasonably achievable. COMPARISON: 02/01/2021. HISTORY: ORDERING SYSTEM PROVIDED HISTORY: IR paracentesis drain placed in the left abdomen, history of cirrhosis TECHNOLOGIST PROVIDED HISTORY: Additional Contrast?->None Reason for exam:->IR paracentesis drain placed in the left abdomen, history of cirrhosis Decision Support Exception->Emergency Medical Condition (MA) FINDINGS: The lung bases demonstrate extensive/ innumerable pulmonary nodules and masses with the largest 1 in the left lower lobe measuring 4.2 x 3 cm and the largest 1 in the right lower lobe measuring 3.2 x 2.6 cm with significant progression in the disease. There is atelectasis and pleural effusion in the left lower lobe. Liver is cirrhotic with nodular border. A previously noted necrotic mass in the right hepatic lobe currently measures 8.3 x 3.3 cm which is somewhat increased in size. There may be tumor invasion with occlusion of the right portal vein which is thrombosed. There is large amount of ascites. Gallbladder is partially distended with gallstones and wall thickening. There is splenomegaly with spleen measuring 19 cm. There is prominence of the portal vein concerning for portal venous hypertension with venous varices at the GE junction and adair of the liver as well as splenic hilum. Pancreas, the adrenals and the kidneys are normal.  There is extensive metastatic nodules in the mesentery with nodules and masses throughout the abdomen pelvis with largest 1 in the abdomen measuring up to 5.5 x 4.1 cm. A large necrotic mass in the pelvis in the rectovesical space measuring 10 x 8 cm with mass effect on the bladder is noted. There is nodularity of the omentum concerning for omental caking/peritoneal carcinomatosis. Subcutaneous mass in the anterior abdominal wall measuring 2.3 x 2.8 cm is noted. Some small retroperitoneal adenopathy is also noted. There is interval placement of a I had catheter in the pelvis with small amount of pneumoperitoneum with air anteriorly likely resultant from the procedure. Pelvis. Bladder is partially distended with the nodularity associated with the bladder wall, probably metastasis to the bladder wall. The previously described the necrotic mass in the pelvis/rectovesical space is noted. Malignant ascites is noted. There is constipation. Appendix is normal.     Interval placement of a drainage catheter in the left lower quadrant/pelvis with small amount of pneumoperitoneum likely related to procedure. Hepatic cirrhosis with splenomegaly and portal venous hypertension with venous varices and large amount of ascites. Progressive widespread metastatic malignancy with progressive widespread pulmonary metastasis, progressive necrotic mass lesion in the right hepatic lobe and peritoneal carcinomatosis with the extensive mesenteric and pelvic necrotic masses and omental caking. There may be malignant ascites. There may be tumor invasion into the right portal vein. Partially distended gallbladder with gallstones and wall thickening which could be either due to cholecystitis versus ascites.        Assessment:    Active Problems:    Pancytopenia (HCC)    Electrolyte imbalance    Ascites    Hyperbilirubinemia    Constipation    Esophageal varices (HCC)    Hepatocellular carcinoma ([facility] [address]  Resolved Problems:    * No resolved hospital problems. *      Plan:  1. Hypotonic Hyponatremia - fluid restriction, albumin spa, renal on board. 2.  Hepatocellular carcinoma with widespread metastasis -gastroenterology and oncology on board. 3.  Hx of Esophageal varices  4. Hyperbilirubinemia   5. Pancytopenia - FOBT is pending, status post 1 unit PRBC 4/17, 1 dose of Granix 4/17. 6.  Malignant Ascites - GI on board, pleurex cath insitu-status post - recent 5 L removal with spa and Pleurx catheter placement- 4/9/2021, - possible removal/placement of cath. 7.  Constipation - as needed Lexapro  8. Acute urinary retention  9. Abd distension    dvt prophylaxis- PCDs  Social - patient remains full code, hospice was consulted, patient not interested in hospice at this time, wants to readdress if he deteriorates/has 2 sons none of them medical power of , eldest son by law will address the issue, if comes to the point that discussion of hospice needed would like hospice [facility]. NOTE: This report was transcribed using voice recognition software. Every effort was made to ensure accuracy; however, inadvertent computerized transcription errors may be present.   Electronically signed by Arlene Montoya MD on 4/17/2021 at 8:37 AM

## 2021-04-17 NOTE — PROGRESS NOTES
S: Patient states still has some leaking ascites around peritoneal catheter, no nausea or vomiting, does have fatigue with anemia Hb of 7.2, no GI bleeding    O:  Temp 98 P-106 R-16 /60  Gen- alert, thin, no distress,pleasant and talking sitting up in bed  HEENT-mmm  Lungs- clear  Cardiac- regular slightly tachycardic  Abd- mild distention, left sided peritoneal cath in place (placed last week) with blood tinged drainage on dressing  Ext- warm and well perfused      Labs: today wb c=1.8 h=7.2 plt=73 creatinine 0.6, ast-42 b caitlyn =2.5 alt=8   77year-old known to  with Hepatocellular carcinoma. Treated with Y-90 on 12/16/20. Started Tecentriq and Bevacizumab on 12/17/20 with good response initially. Last treatment was on 3/11/21 then recently developed progression in lungs, peritoneum with ascites and liver s/p peritoneal catheter placement last week. .  Admitted due to fluid draining around peritoneal catheter. He has silvia progressive panyctopenia . Anemia may be partially due to blood drainage should rule out peritonitis given leukopenia and worsening thromocytopenia. 1. Culture ascitic fluid  2. 1 dose granix  3.  Transfuse 1 unit RBC  4. GI considering replacement of peritoneal catheter if does not improve    [facility] [place]

## 2021-04-17 NOTE — PROGRESS NOTES
1 g, 4x Daily  traMADol (ULTRAM) tablet 50 mg, Q8H         Data Review  CBC:   Lab Results   Component Value Date    WBC 8.3 04/18/2021    RBC 3.29 04/18/2021    RBC  09/01/2016      RBC           [id number]    transfused   09/02/16  00:19  SCC    RBC  09/01/2016      RBC           [id number]    transfused   09/02/16  19:47  SCC    HGB 8.2 04/18/2021    HCT 26.4 04/18/2021    MCV 80.2 04/18/2021    MCH 24.9 04/18/2021    MCHC 31.1 04/18/2021    RDW 19.8 04/18/2021    PLT 82 04/18/2021    MPV 10.4 04/18/2021     CMP:    Lab Results   Component Value Date     04/18/2021    K 3.7 04/18/2021    K 4.0 04/15/2021    CL 89 04/18/2021    CO2 25 04/18/2021    BUN 15 04/18/2021    CREATININE 0.5 04/18/2021    GFRAA >60 04/18/2021    LABGLOM >60 04/18/2021    GLUCOSE 94 04/18/2021    PROT 5.4 04/18/2021    LABALBU 3.0 04/18/2021    CALCIUM 8.7 04/18/2021    BILITOT 3.6 04/18/2021    ALKPHOS 71 04/18/2021    AST 43 04/18/2021    ALT 12 04/18/2021     Hepatic Function Panel:    Lab Results   Component Value Date    ALKPHOS 71 04/18/2021    ALT 12 04/18/2021    AST 43 04/18/2021    PROT 5.4 04/18/2021    BILITOT 3.6 04/18/2021    BILIDIR 1.1 04/15/2021    IBILI 0.9 04/15/2021    LABALBU 3.0 04/18/2021     No components found for: CHLPLat  Lab Results   Component Value Date    TRIG 72 04/30/2013   e    HDL 78.8 04/30/2013 04/30/2013     No results found for: LABVLDL   PT/INR:    Lab Results   Component Value Date    PROTIME 16.8 04/18/2021    INR 1.6 04/18/2021     IRON:    Lab Results   Component Value Date    IRON 31 10/18/2016     Iron Saturation:  No components found for: PERCENTFE  FERRITIN:    Lab Results   Component Value Date    FERRITIN 22 10/18/2016         Assessment:     Active Problems:  ? Hepatocellular carcinoma s/p Y-90 treatments 12/16/20, last chemo 3/11/21 now with malignant ascites and widespread metastatic disease - hem/onc following  ? Abdominal pain  ?  PleurX catheter with leaking around site  ? Elevated LFTs, secondary to cirrhosis and liver CA  ? Cirrhosis of liver,   ? Hyperammonemia  ? Anemia, normocytic, hypochromic  ? Constipation  ? Thrombocytopenia  ? Venous varices and portal vein hypertension  ? Hyponatremia - renal consulted  ? EGD with banding 1/18/21-One large varix in the esophagus, estimated to be grade 4/4 with stigmata of pending bleeding, banded with two bands. Stomach showed mild gastritis. No gastric varices. Duodenum unremarkable.   ? S/P large volume ascites drainage via PleurX for 5 liters reddish brown fluid 4/16/21      Plan:     ? Drain PleurX for 750 ml daily  ? Possible IR PleurX cath exchange - IR to assess tomorrow, orders placed  ? Occult stool, please call me with results  ? Continue lactulose as ordered  ? Continue Carafate as ordered  ? Continue general diet as ordered  ? Monitor CBC, CMP, ammonia and INR daily  ? Supportive care  ? Continue to monitor    Note: This report was completed utilizing computer voice recognition software. Every effort has been made to ensure accuracy, however; inadvertent computerized transcription errors may be present.      Discussed with Dr. Maya Jo per Dr. Laverne Leon [id number], FNP-BC 4/18/2021 3:00 PM For Dr. Roxana Cifuentes

## 2021-04-18 NOTE — PROGRESS NOTES
[facility] Progress Note    Admitting Date and Time: 4/15/2021  3:47 PM  Admit Dx: Hepatocellular carcinoma ([facility] [address] [C22.0]    Subjective:  Patient is being followed for Hepatocellular carcinoma ([facility] [address] [C22.0]   Pt feels very uncomfortable from all the seepage and constant changing of dressing around the Pleurx catheter . Per RN: No major concerns    ROS: denies fever, chills, cp, sob, n/v, HA unless stated above.       sodium chloride  500 mL Intravenous Once    sodium chloride  1 g Oral BID WC    cefTRIAXone (ROCEPHIN) IV  1,000 mg Intravenous Q24H    lactulose  20 g Oral BID    influenza A&B vaccine  0.5 mL Intramuscular Once    mirtazapine  15 mg Oral Nightly    sucralfate  1 g Oral 4x Daily    traMADol  50 mg Oral Q8H     sodium chloride, , PRN  sennosides-docusate sodium, 2 tablet, Daily PRN         Objective:    BP (!) 98/58   Pulse 104   Temp 98.2 °F (36.8 °C) (Oral)   Resp 16   Ht 6' (1.829 m)   Wt 152 lb 9 oz (69.2 kg)   SpO2 96%   BMI 20.69 kg/m²     General Appearance: alert and oriented to person, place and time and in no acute distress  Skin: warm and dry  Head: normocephalic and atraumatic  Eyes: pupils equal, round, and reactive to light, extraocular eye movements intact, conjunctivae normal  Neck: neck supple and non tender without mass   Pulmonary/Chest: clear to auscultation bilaterally- no wheezes, rales or rhonchi, normal air movement, no respiratory distress  Cardiovascular: Tachycardic, normal S1 and S2 and no carotid bruits  Abdomen: soft, non-tender, distended, normal bowel sounds, no masses or organomegaly, left-sided peritoneal catheter draining serosanguineous fluid +  Extremities: no cyanosis, no clubbing and no edema  Neurologic: no cranial nerve deficit and speech normal        Recent Labs     04/16/21  0430 04/17/21  0625 04/17/21  2205 04/18/21  0358   * 125* 123* 124*   K 4.1 4.3  --  3.7   CL 90* 90*  --  89*   CO2 24 27  --  25   BUN 16 16 ascites. Gallbladder is partially distended with gallstones and wall thickening. There is splenomegaly with spleen measuring 19 cm. There is prominence of the portal vein concerning for portal venous hypertension with venous varices at the GE junction and adair of the liver as well as splenic hilum. Pancreas, the adrenals and the kidneys are normal.  There is extensive metastatic nodules in the mesentery with nodules and masses throughout the abdomen pelvis with largest 1 in the abdomen measuring up to 5.5 x 4.1 cm. A large necrotic mass in the pelvis in the rectovesical space measuring 10 x 8 cm with mass effect on the bladder is noted. There is nodularity of the omentum concerning for omental caking/peritoneal carcinomatosis. Subcutaneous mass in the anterior abdominal wall measuring 2.3 x 2.8 cm is noted. Some small retroperitoneal adenopathy is also noted. There is interval placement of a I had catheter in the pelvis with small amount of pneumoperitoneum with air anteriorly likely resultant from the procedure. Pelvis. Bladder is partially distended with the nodularity associated with the bladder wall, probably metastasis to the bladder wall. The previously described the necrotic mass in the pelvis/rectovesical space is noted. Malignant ascites is noted. There is constipation. Appendix is normal.     Interval placement of a drainage catheter in the left lower quadrant/pelvis with small amount of pneumoperitoneum likely related to procedure. Hepatic cirrhosis with splenomegaly and portal venous hypertension with venous varices and large amount of ascites. Progressive widespread metastatic malignancy with progressive widespread pulmonary metastasis, progressive necrotic mass lesion in the right hepatic lobe and peritoneal carcinomatosis with the extensive mesenteric and pelvic necrotic masses and omental caking. There may be malignant ascites. There may be tumor invasion into the right portal vein. Partially distended gallbladder with gallstones and wall thickening which could be either due to cholecystitis versus ascites. Assessment:    Active Problems:    Pancytopenia (HCC)    Electrolyte imbalance    Ascites    Hyperbilirubinemia    Constipation    Esophageal varices (HCC)    Hepatocellular carcinoma (HCC)  Resolved Problems:    * No resolved hospital problems. *      Plan:  1. Hypotonic Hyponatremia - fluid restriction, albumin spa, renal on board. 2.  Hepatocellular carcinoma with widespread metastasis -gastroenterology and oncology on board. 3.  Hx of Esophageal varices  4. Hyperbilirubinemia   5. Pancytopenia - FOBT is pending, status post 1 unit PRBC 4/17, 1 dose of Granix 4/17. 6.  Malignant Ascites - GI on board, pleurex cath insitu-status post - recent 5 L removal with spa and Pleurx catheter placement- 4/9/2021, currently catheter site leaking, status post 750 cc drain 4/17,  possible removal/replacement of cath Monday. 7.  Constipation - as needed Lexapro  8. Acute urinary retention  9. Abd distension    Dvt prophylaxis - PCDs  Social - patient remains full code, hospice was consulted, patient not interested in hospice at this time, wants to readdress if he deteriorates/has 2 sons none of them medical power of , eldest son by law will address the issue, if comes to the point that discussion of hospice needed would like hospice [facility]. NOTE: This report was transcribed using voice recognition software. Every effort was made to ensure accuracy; however, inadvertent computerized transcription errors may be present.   Electronically signed by Blaire Jennings MD on 4/18/2021 at 12:01 PM

## 2021-04-18 NOTE — PROGRESS NOTES
Per physician order parameters, perfect serve sent to Dr. Juan Delgado, covering for Dr. Armando Oswald, regarding sodium of 123. Awaiting response. Diet order changed to dry tray per Dr. Juan Delgado.      Electronically signed by Genna Howard RN on 4/17/2021 at 10:34 PM

## 2021-04-18 NOTE — PLAN OF CARE
Problem: Pain:  Goal: Pain level will decrease  Description: Pain level will decrease  4/18/2021 1229 by Anuradha Osuna RN  Outcome: Met This Shift  4/17/2021 2248 by Loco Azevedo RN  Outcome: Ongoing  Goal: Control of acute pain  Description: Control of acute pain  4/18/2021 1229 by Anuradha Osuna RN  Outcome: Met This Shift  4/17/2021 2248 by Loco Azevedo RN  Outcome: Ongoing  Goal: Control of chronic pain  Description: Control of chronic pain  4/17/2021 2248 by Loco Azevedo RN  Outcome: Ongoing

## 2021-04-18 NOTE — PROGRESS NOTES
Message sent to Dr. Dick Hayward regarding 1900 sodium level being 122.     Electronically signed by Tania George RN on 4/18/2021 at 7:36 PM

## 2021-04-19 PROBLEM — E72.20 HYPERAMMONEMIA (HCC): Status: ACTIVE | Noted: 2021-01-01

## 2021-04-19 NOTE — PROGRESS NOTES
PROGRESS NOTE    Patient Presents with/Seen in Consultation For      *ascites with metastaitic liver ca  CHIEF COMPLAINT:  Abdominal distention and concerns for post op complications    Subjective:     Patient seen Reny Elena in bed in no apparent distress just getting back from IR for drain evaluation. Reports diffuse abdominal pain 3/10, improving. Pt due for fluid removal today yet. Stating he wants to eat and feeling overall better. With BM overnight denies melena or hematochezia. Plan of care discussed with patient and son at bedside with additional questions and concerns addressed. Review of Systems  Aside from what was mentioned in the PMH and HPI, essentially unremarkable, all others negative. Objective:     BP (!) 98/52   Pulse 107   Temp 97.9 °F (36.6 °C) (Oral)   Resp 16   Ht 6' (1.829 m)   Wt 148 lb 4 oz (67.2 kg)   SpO2 93%   BMI 20.11 kg/m²     General appearance: alert, awake, pale, thin, ill appearing, lauing in bed, and cooperative  Eyes: conjunctiva pale, sclera anicteric. PERRL.   Lungs: diminished to auscultation bilaterally  Heart: regular rate and rhythm, no murmur, 2+ pulses; no edema  Abdomen:  distended, mild diffuse tenderness to palpation without guarding or rebound; + ascites, PleurX dressing CDI; bowel sounds normal; no masses,  no organomegaly  Extremities: extremities without edema  Pulses: 2+ and symmetric  Skin: Skin color pale, texture, turgor normal.   Neurologic: Grossly normal    sodium chloride tablet 1 g, BID WC  0.9 % sodium chloride infusion, PRN  cefTRIAXone (ROCEPHIN) 1,000 mg in sterile water 10 mL IV syringe, Q24H  lactulose (CHRONULAC) 10 GM/15ML solution 20 g, BID  influenza A&B vaccine (FLUAD QUADRIVALENT) injection 0.5 mL, Once  mirtazapine (REMERON) tablet 15 mg, Nightly  sennosides-docusate sodium (SENOKOT-S) 8.6-50 MG tablet 2 tablet, Daily PRN  sucralfate (CARAFATE) tablet 1 g, 4x Daily  traMADol (ULTRAM) tablet 50 mg, Q8H         Data Review  CBC:   Lab Results   Component Value Date    WBC 5.2 04/19/2021    RBC 3.22 04/19/2021    RBC  09/01/2016      RBC           [id number]    transfused   09/02/16  00:19  SCC    RBC  09/01/2016      RBC           [id number]    transfused   09/02/16  19:47  SCC    HGB 8.0 04/19/2021    HCT 26.0 04/19/2021    MCV 80.7 04/19/2021    MCH 24.8 04/19/2021    MCHC 30.8 04/19/2021    RDW 20.1 04/19/2021    PLT 82 04/19/2021    MPV 10.4 04/19/2021     CMP:    Lab Results   Component Value Date     04/19/2021    K 3.8 04/19/2021    K 4.0 04/15/2021    CL 92 04/19/2021    CO2 25 04/19/2021    BUN 17 04/19/2021    CREATININE 0.5 04/19/2021    GFRAA >60 04/19/2021    LABGLOM >60 04/19/2021    GLUCOSE 107 04/19/2021    PROT 5.3 04/19/2021    LABALBU 2.8 04/19/2021    CALCIUM 8.4 04/19/2021    BILITOT 2.2 04/19/2021    ALKPHOS 74 04/19/2021    AST 41 04/19/2021    ALT 10 04/19/2021     Hepatic Function Panel:    Lab Results   Component Value Date    ALKPHOS 74 04/19/2021    ALT 10 04/19/2021    AST 41 04/19/2021    PROT 5.3 04/19/2021    BILITOT 2.2 04/19/2021    BILIDIR 1.1 04/15/2021    IBILI 0.9 04/15/2021    LABALBU 2.8 04/19/2021     No components found for: CHLPL    Lab Results   Component Value Date    TRIG 72 04/30/2013       Lab Results   Component Value Date    HDL 78.8 04/30/2013       Lab Results   Component Value Date    LDLCALC 46 04/30/2013     No results found for: LABVLDL   PT/INR:    Lab Results   Component Value Date    PROTIME 16.5 04/19/2021    INR 1.5 04/19/2021     IRON:    Lab Results   Component Value Date    IRON 31 10/18/2016     Iron Saturation:  No components found for: PERCENTFE  FERRITIN:    Lab Results   Component Value Date    FERRITIN 22 10/18/2016         Assessment:     Active Problems:  ? Hepatocellular carcinoma s/p Y-90 treatments 12/16/20, last chemo 3/11/21 now with malignant ascites and widespread metastatic disease - hem/onc following  ? Abdominal pain  ?  PleurX catheter

## 2021-04-19 NOTE — PROGRESS NOTES
Nephrology Progress Note      Events Reviewed. Subjective: We are following Mr. Kailee Goldstein for hyponatremia.   He reports no new complaints including headache, nausea, vomiting, instability    Current Medications:    sodium chloride tablet 1 g, BID WC  0.9 % sodium chloride infusion, PRN  cefTRIAXone (ROCEPHIN) 1,000 mg in sterile water 10 mL IV syringe, Q24H  lactulose (CHRONULAC) 10 GM/15ML solution 20 g, BID  influenza A&B vaccine (FLUAD QUADRIVALENT) injection 0.5 mL, Once  mirtazapine (REMERON) tablet 15 mg, Nightly  sennosides-docusate sodium (SENOKOT-S) 8.6-50 MG tablet 2 tablet, Daily PRN  sucralfate (CARAFATE) tablet 1 g, 4x Daily  traMADol (ULTRAM) tablet 50 mg, Q8H      Physical exam:   Constitutional:    Vitals: BP (!) 96/58   Pulse 103   Temp 98 °F (36.7 °C) (Oral)   Resp 14   Ht 6' (1.829 m)   Wt 148 lb 4 oz (67.2 kg)   SpO2 96%   BMI 20.11 kg/m²      Cachectic looking  male, lying in bed, appears in no acute distress, alert awake and oriented x3  Skin: no rash, turgor wnl  Heent:  eomi, mmm  Neck: no bruits or jvd noted  Cardiovascular:  S1, S2 without m/r/g  Respiratory: CTA B without w/r/r  Abdomen:  +bs, soft, abdomen is diffusely distended, tender, abdominal catheter in place   Ext: No lower extremity edema  Psychiatric: mood and affect appropriate  Musculoskeletal:  Rom, muscular strength intact    Data:   Labs:  CBC:   Lab Results   Component Value Date    WBC 5.2 04/19/2021    RBC 3.22 04/19/2021    RBC  09/01/2016      RBC           [id number]    transfused   09/02/16  00:19  SCC    RBC  09/01/2016      RBC           [id number]    transfused   09/02/16  19:47  SCC    HGB 8.0 04/19/2021    HCT 26.0 04/19/2021    MCV 80.7 04/19/2021    MCH 24.8 04/19/2021    MCHC 30.8 04/19/2021    RDW 20.1 04/19/2021    PLT 82 04/19/2021    MPV 10.4 04/19/2021     CMP:    Lab Results   Component Value Date     04/19/2021    K 3.8 04/19/2021    K 4.0 04/15/2021    CL 92 04/19/2021    CO2 25 04/19/2021    BUN 17 04/19/2021    CREATININE 0.5 04/19/2021    GFRAA >60 04/19/2021    LABGLOM >60 04/19/2021    GLUCOSE 107 04/19/2021    PROT 5.3 04/19/2021    LABALBU 2.8 04/19/2021    CALCIUM 8.4 04/19/2021    BILITOT 2.2 04/19/2021    ALKPHOS 74 04/19/2021    AST 41 04/19/2021    ALT 10 04/19/2021     BMP:    Lab Results   Component Value Date     04/19/2021    K 3.8 04/19/2021    K 4.0 04/15/2021    CL 92 04/19/2021    CO2 25 04/19/2021    BUN 17 04/19/2021    LABALBU 2.8 04/19/2021    CREATININE 0.5 04/19/2021    CALCIUM 8.4 04/19/2021    GFRAA >60 04/19/2021    LABGLOM >60 04/19/2021    GLUCOSE 107 04/19/2021     Calcium:    Lab Results   Component Value Date    CALCIUM 8.4 04/19/2021     Phosphorus:    Lab Results   Component Value Date    PHOS 3.5 10/18/2016     Uric Acid:  No results found for: URICACID  U/A:  No results found for: [place] Dragon, PHUR, LABCAST, [address], RBCUA, MUCUS, TRICHOMONAS, YEAST, BACTERIA, CLARITYU, SPECGRAV, LEUKOCYTESUR, UROBILINOGEN, [place], [address] [place], AMORPHOUS     Results for Ze Seymour (MRN [id number]) as of 4/17/2021 13:28   Ref. Range 4/16/2021 17:28   Chloride Latest Ref Range: Not Established mmol/L <20   Creatinine, Ur Latest Ref Range: 40 - 278 mg/dL 146   Osmolality, Ur Latest Ref Range: 300 - 900 mOsm/kg 823   Potassium, Ur Latest Ref Range: Not Established mmol/L 84.1   Sodium, Ur Latest Ref Range: Not Established mmol/L <20   Urea Nitrogen, Ur Latest Ref Range: 800 - 1666 mg/dL 1126       Imaging:  Ct Abdomen Pelvis W Iv Contrast Additional Contrast? None    Result Date: 4/15/2021  EXAMINATION: CT OF THE ABDOMEN AND PELVIS WITH CONTRAST 4/15/2021 6:07 pm TECHNIQUE: CT of the abdomen and pelvis was performed with the administration of intravenous contrast. Multiplanar reformatted images are provided for review.  Dose modulation, iterative reconstruction, and/or weight based adjustment of the mA/kV was utilized to reduce the radiation dose to as low as reasonably achievable. COMPARISON: 02/01/2021. HISTORY: ORDERING SYSTEM PROVIDED HISTORY: IR paracentesis drain placed in the left abdomen, history of cirrhosis TECHNOLOGIST PROVIDED HISTORY: Additional Contrast?->None Reason for exam:->IR paracentesis drain placed in the left abdomen, history of cirrhosis Decision Support Exception->Emergency Medical Condition (MA) FINDINGS: The lung bases demonstrate extensive/ innumerable pulmonary nodules and masses with the largest 1 in the left lower lobe measuring 4.2 x 3 cm and the largest 1 in the right lower lobe measuring 3.2 x 2.6 cm with significant progression in the disease. There is atelectasis and pleural effusion in the left lower lobe. Liver is cirrhotic with nodular border. A previously noted necrotic mass in the right hepatic lobe currently measures 8.3 x 3.3 cm which is somewhat increased in size. There may be tumor invasion with occlusion of the right portal vein which is thrombosed. There is large amount of ascites. Gallbladder is partially distended with gallstones and wall thickening. There is splenomegaly with spleen measuring 19 cm. There is prominence of the portal vein concerning for portal venous hypertension with venous varices at the GE junction and adair of the liver as well as splenic hilum. Pancreas, the adrenals and the kidneys are normal.  There is extensive metastatic nodules in the mesentery with nodules and masses throughout the abdomen pelvis with largest 1 in the abdomen measuring up to 5.5 x 4.1 cm. A large necrotic mass in the pelvis in the rectovesical space measuring 10 x 8 cm with mass effect on the bladder is noted. There is nodularity of the omentum concerning for omental caking/peritoneal carcinomatosis. Subcutaneous mass in the anterior abdominal wall measuring 2.3 x 2.8 cm is noted. Some small retroperitoneal adenopathy is also noted.   There is interval placement of a I had catheter in the pelvis with small amount of pneumoperitoneum with air anteriorly likely resultant from the procedure. Pelvis. Bladder is partially distended with the nodularity associated with the bladder wall, probably metastasis to the bladder wall. The previously described the necrotic mass in the pelvis/rectovesical space is noted. Malignant ascites is noted. There is constipation. Appendix is normal.     Interval placement of a drainage catheter in the left lower quadrant/pelvis with small amount of pneumoperitoneum likely related to procedure. Hepatic cirrhosis with splenomegaly and portal venous hypertension with venous varices and large amount of ascites. Progressive widespread metastatic malignancy with progressive widespread pulmonary metastasis, progressive necrotic mass lesion in the right hepatic lobe and peritoneal carcinomatosis with the extensive mesenteric and pelvic necrotic masses and omental caking. There may be malignant ascites. There may be tumor invasion into the right portal vein. Partially distended gallbladder with gallstones and wall thickening which could be either due to cholecystitis versus ascites. Ir Us Guided Paracentesis    Result Date: 3/24/2021  Patient MRN:  [id number] : 1955 Age: 77 years Gender: Male Order Date:  3/24/2021 8:51 AM EXAM: IR US GUIDED PARACENTESIS NUMBER OF IMAGES:  2 INDICATION: C22.0 Hepatocellular carcinoma ([address] paracentesis What reading provider will be dictating this exam?->[facility] PARACENTESIS: Ultrasound was performed demonstrating ascites. Routine ultrasound and Doppler ultrasound were used. Using direct real-time ultrasound imaging  access was obtained. An image was stored and copy was transmitted to PACS. After obtaining informed consent and after the routine sterile prep and drape and after the administration of a local anesthesia, a system of needles and cannulas was guided by ultrasound control into the peritoneal cavity.  Subsequently with real-time guidance a catheter was inserted. Peritoneal access was achieved. The needle was visualized with ultrasound in real-time in position within the peritoneum. The needle was seen within the peritoneum with ultrasound in real-time in position and ascites fluid. The catheter was removed at the end of the procedure. The patient tolerated the procedure well. There were no complications. The patient was released in stable condition. Time out occurred at 09 53 hours. A total of 6100 cc of colored fluid was removed. Successful paracentesis. Ir Guided Tunneled Intraperitoneal Cath W Or Wo Contrast Perc    Result Date: 2021  NUMBER OF IMAGES: Patient MRN:  [id number] : 1955 Age: 77 years Gender: Male Order Date:  2021 11:37 AM EXAM: IR GUIDED TUNNELED INTRAPERITONEAL CATH W OR WO CONTRAST PERC NUMBER OF IMAGES:  4 INDICATION: C22.0 Hepatocellular carcinoma ([address] placement of plexus catheter for recurrent ascites What reading provider will be dictating this exam?->[facility] COMPARISON: None Insertion of a permanent peritoneal drainage catheter with tunnel creation: After obtaining informed consent and following the routine sterile prep and drape a combination of ultrasound and fluoroscopy were utilized to place a needle into the peritoneal cavity and ascitic fluid was obtained. Subsequently after the administration of local anesthesia a tunnel tract was created and dilated. Two separate incisions were made. Routine ultrasound and Doppler ultrasound were used. Using direct real-time ultrasound imaging peritoneal access was obtained. An image was stored and copy was transmitted to PACS. Subsequently with real-time guidance a needle was inserted intravascular and through the needle a wire. The needle tip was visualized with real time guidance as it was placed into the peritoneal space.  A tunneled catheter was subsequently advanced from the first incision into the second incision and subsequently a catheter was advanced into the peritoneal space. Confirmation of position was confirmed under fluoroscopic control. Time out occurred at 1148 hours. Patient received 1 mg of Versed, 50 mcg of fentanyl and local anesthesia and was monitored for 30 minutes or less by a registered nurse. Patient received 12 seconds fluoroscopy. Subsequently ascitic fluid was drained. The patient tolerated the procedure well. The skin was sutured with 3-0 nylon. The drain was sutured in place. The cuff was placed appropriately. Successful uncomplicated placement of a Pleurx catheter for a permanent peritoneal catheter drainage. Results for Luiz Garcia (MRN [id number]) as of 4/18/2021 12:19   Ref. Range 4/16/2021 17:28   Chloride Latest Ref Range: Not Established mmol/L <20   Creatinine, Ur Latest Ref Range: 40 - 278 mg/dL 146   Osmolality, Ur Latest Ref Range: 300 - 900 mOsm/kg 823   Potassium, Ur Latest Ref Range: Not Established mmol/L 84.1   Sodium, Ur Latest Ref Range: Not Established mmol/L <20   Urea Nitrogen, Ur Latest Ref Range: 800 - 1666 mg/dL 1126     Assessment  1. Hypotonic hyponatremia likely secondary to poor solute intake, excess free water intake. He does not have any neurological symptoms at this time. Getting better./  2. Metastatic hepatocellular carcinoma with possible peritoneal carcinomatosis  3. Severe hypoalbuminemia from liver disease  4. Malignant ascites needing daily drainage, 5 L removed and Albumin Given  5. Anemia, multifactorial from underlying malignancy, chronic disease    Plan  1. Decrease fluid restriction to 1 L/day  2. Urine electrolytes are consistent with hypovolemia, give 500 mL of normal saline bolus, start sodium chloride tab 1 g twice daily for 6 doses  3.  Repeat sodium at 7 PM today      Electronically signed by Magalys Beatty MD on 4/19/2021 at 9:38 AM

## 2021-04-19 NOTE — PROGRESS NOTES
Assisted Dr. Simeon Robert on the floor at bedside with suture removal and new suture to pleurx site. Pt tolerated well, a new sterile dressing applied to site. Nurse on floor made aware and will call IR with any complications.

## 2021-04-19 NOTE — PROGRESS NOTES
4/19/2021  .nolw      Comprehensive Nutrition Assessment    Type and Reason for Visit:  Initial, Positive Nutrition Screen    Nutrition Recommendations/Plan: Continue current diet and ONS, as tolerated    Nutrition Assessment:  Pt w/hx metastatic hepatocellular CA admit w/leaking around Pleurex (tube placed d/t ascites). IR sutured tube at bedside and pt feeling better and hungry. Will add Frozen ONS and Boost pudding to optimize PO while on 1000 ml FR/d    Malnutrition Assessment:  Malnutrition Status:   Moderate malnutrition    Context:  Chronic Illness     Findings of the 6 clinical characteristics of malnutrition:  Energy Intake:  7 - 75% or less estimated energy requirements for 1 month or longer  Weight Loss:  7 - Greater than 10% over 6 months     Body Fat Loss:  (moderate fat loss) Orbital   Muscle Mass Loss:  (moderate muscle loss) Clavicles (pectoralis & deltoids)  Fluid Accumulation:  Unable to assess Ascites, Extremities(multiple factors)   Strength:  Not Performed    Estimated Daily Nutrient Needs:  Energy (kcal):  8491-9894; Weight Used for Energy Requirements:  Current     Protein (g):  85-95 (1.2-1.4 g/kg) as cristóbal; Weight Used for Protein Requirements:  Current        Fluid (ml/day):  1000 ml FR/d currently; Method Used for Fluid Requirements:  Other (Comment)      Nutrition Related Findings:  A&Ox4, distended abdomen +BS, +2 edema, poor dentition, Pleurex tube,  I/O WNL, hyponatremia, ammonia WNL      Wounds:  (blanchable red areas)       Current Nutrition Therapies:    DIET GENERAL; Daily Fluid Restriction: 1000 ml  Dietary Nutrition Supplements: Frozen Oral Supplement  Dietary Nutrition Supplements: Other Oral Supplement (see comment)    Anthropometric Measures:  · Height: 6' (182.9 cm)  · Current Body Weight: 148 lb (67.1 kg)(4/19)   · Admission Body Weight: 165 lb (74.8 kg)(4/15 [facility])    · Usual Body Weight: 183 lb (83 kg)(per EMR x 6 mo ago (no edema/ascites doc at that time))     · [place] Body Weight: 178 lbs; % Ideal Body Weight 83.1 %   · BMI: 20.1  · BMI Categories: Normal Weight (BMI 18.5-24. 9)       Nutrition Diagnosis:   · Moderate malnutrition, In context of chronic illness related to catabolic illness(metastatic hepatocellular CA) as evidenced by moderate muscle loss, mild loss of subcutaneous fat, weight loss greater than or equal to 10% in 6 months      Nutrition Interventions:   Food and/or Nutrient Delivery:  Continue Current Diet, Start Oral Nutrition Supplement  Nutrition Education/Counseling:  No recommendation at this time   Coordination of Nutrition Care:  Continue to monitor while inpatient    Goals:  PO >75% at meals/ONS       Nutrition Monitoring and Evaluation:   Behavioral-Environmental Outcomes:  None Identified   Food/Nutrient Intake Outcomes:  Food and Nutrient Intake, Supplement Intake  Physical Signs/Symptoms Outcomes:  Biochemical Data, GI Status, Fluid Status or Edema, Nutrition Focused Physical Findings, Skin, Weight     Discharge Planning:    Continue Oral Nutrition Supplement     Electronically signed by Lopez Nelson RD, CNSC, LD on 4/19/21 at 2:40 PM EDT    Contact: [phone]

## 2021-04-19 NOTE — PROGRESS NOTES
AAOx3  ABD soft, non tender. Indwelling abd pleurex in the Left ABD w/ leakage of ascitic fluid  Non labored respirations    Vitals:    21 0850   BP: (!) 96/58   Pulse: 103   Resp: 14   Temp: 98 °F (36.7 °C)   SpO2: 96%         66M w/ indwelling ABD pleurex from recurrent malignant asicites. -sutures were replaced, leakage stopped. Dressing replaced at bedside. -IR will sign off if no other issues.       Sulma Bourgeois MD  Vascular and Interventional Radiology ([facility])    Daytime direct number to [address]: [address]:      [address] [place] [address] Core: [phone]  Outpatient IR [phone]

## 2021-04-19 NOTE — PROGRESS NOTES
Admit Date: 4/15/2021  Hospital day 4    Subjective:     Patient resting in bed in NAD. Feeling better today. Says he had BM and belly feels less swollen. Denies CP, SOB. Eating well. Objective:       I/O last 3 completed shifts: In: 300 [P.O.:300]  Out: 750 [Drains:750]      /61   Pulse 92   Temp 97.8 °F (36.6 °C) (Oral)   Resp 16   Ht 6' (1.829 m)   Wt 148 lb 4 oz (67.2 kg)   SpO2 94%   BMI 20.11 kg/m²   General appearance: alert, appears stated age, cachectic, cooperative and no distress  Lungs: clear to auscultation bilaterally  Heart: regular rate and rhythm, S1, S2 normal, no murmur, click, rub or gallop  Abdomen: abdomen distended with ascites but improved from preceeding days. BS present.  no RRG  Extremities: edema 1+ RLE  Skin: pallor       Data ReviewCBC:       Recent Results (from the past 24 hour(s))   Sodium    Collection Time: 04/18/21  7:00 PM   Result Value Ref Range    Sodium 122 (L) 132 - 146 mmol/L   Comprehensive metabolic panel    Collection Time: 04/19/21  4:02 AM   Result Value Ref Range    Sodium 126 (L) 132 - 146 mmol/L    Potassium 3.8 3.5 - 5.0 mmol/L    Chloride 92 (L) 98 - 107 mmol/L    CO2 25 22 - 29 mmol/L    Anion Gap 9 7 - 16 mmol/L    Glucose 107 (H) 74 - 99 mg/dL    BUN 17 8 - 23 mg/dL    CREATININE 0.5 (L) 0.7 - 1.2 mg/dL    GFR Non-African American >60 >=60 mL/min/1.73    GFR African American >60     Calcium 8.4 (L) 8.6 - 10.2 mg/dL    Total Protein 5.3 (L) 6.4 - 8.3 g/dL    Albumin 2.8 (L) 3.5 - 5.2 g/dL    Total Bilirubin 2.2 (H) 0.0 - 1.2 mg/dL    Alkaline Phosphatase 74 40 - 129 U/L    ALT 10 0 - 40 U/L    AST 41 (H) 0 - 39 U/L   CBC WITH AUTO DIFFERENTIAL    Collection Time: 04/19/21  4:02 AM   Result Value Ref Range    WBC 5.2 4.5 - 11.5 E9/L    RBC 3.22 (L) 3.80 - 5.80 E12/L    Hemoglobin 8.0 (L) 12.5 - 16.5 g/dL    Hematocrit 26.0 (L) 37.0 - 54.0 %    MCV 80.7 80.0 - 99.9 fL    MCH 24.8 (L) 26.0 - 35.0 pg    MCHC 30.8 (L) 32.0 - 34.5 %    RDW 20.1 (H) 11.5 - 15.0 fL    Platelets 82 (L) 167 - 450 E9/L    MPV 10.4 7.0 - 12.0 fL    Neutrophils % 81.5 (H) 43.0 - 80.0 %    Immature Granulocytes % 1.0 0.0 - 5.0 %    Lymphocytes % 6.9 (L) 20.0 - 42.0 %    Monocytes % 9.8 2.0 - 12.0 %    Eosinophils % 0.6 0.0 - 6.0 %    Basophils % 0.2 0.0 - 2.0 %    Neutrophils Absolute 4.22 1.80 - 7.30 E9/L    Immature Granulocytes # 0.05 E9/L    Lymphocytes Absolute 0.36 (L) 1.50 - 4.00 E9/L    Monocytes Absolute 0.51 0.10 - 0.95 E9/L    Eosinophils Absolute 0.03 (L) 0.05 - 0.50 E9/L    Basophils Absolute 0.01 0.00 - 0.20 E9/L    Anisocytosis 2+     Polychromasia 1+     Hypochromia 1+     Poikilocytes 1+     Ovalocytes 1+    Lactic acid, plasma    Collection Time: 04/19/21  4:02 AM   Result Value Ref Range    Lactic Acid 2.4 (H) 0.5 - 2.2 mmol/L   Protime-INR    Collection Time: 04/19/21  4:02 AM   Result Value Ref Range    Protime 16.5 (H) 9.3 - 12.4 sec    INR 1.5    Ammonia    Collection Time: 04/19/21  4:02 AM   Result Value Ref Range    Ammonia 48.7 16.0 - 60.0 umol/L   Platelet Confirmation    Collection Time: 04/19/21  4:02 AM   Result Value Ref Range    Platelet Confirmation CONFIRMED            Assessment:     Active Problems:    Pancytopenia (HCC)    Electrolyte imbalance    Ascites    Hyperbilirubinemia    Constipation    Hyperammonemia (HCC)    Esophageal varices (HCC)    Hepatocellular carcinoma (HCC)  Resolved Problems:    * No resolved hospital problems. *      Plan:     Hematology on board. Drain site to be relocated today by IR. Renal managing NA+/ fluids. Follow labs closely. Plan to discharge when lytes stabilize.

## 2021-04-19 NOTE — PROGRESS NOTES
Subjective:  Patient is awake and alert laying in bed. Pleurx catheter is no longer leaking. He feels much better today. No SOB or CP. No nausea or vomiting. He is eating and drinking. Objective:    BP (!) 98/52   Pulse 107   Temp 97.9 °F (36.6 °C) (Oral)   Resp 16   Ht 6' (1.829 m)   Wt 148 lb 4 oz (67.2 kg)   SpO2 93%   BMI 20.11 kg/m²     General: NAD, talkative,   HEENT: No thrush or mucositis, EOMI, PERRLA  Heart:  RRR, no murmurs, gallops, or rubs.   Lungs:  CTA bilaterally, no wheeze, rales or rhonchi  Abd: bowel sounds present, tender, distended, no masses, catheter c/d/i  Extrem:  No clubbing, cyanosis, or edema  Lymphatics: No palpable adenopathy in cervical and supraclavicular regions  Skin: Intact, no petechia or purpura    CBC with Differential:    Lab Results   Component Value Date    WBC 5.2 04/19/2021    RBC 3.22 04/19/2021    RBC  09/01/2016      RBC           [id number]    transfused   09/02/16  00:19  SCC    RBC  09/01/2016      RBC           [id number]    transfused   09/02/16  19:47  SCC    HGB 8.0 04/19/2021    HCT 26.0 04/19/2021    PLT 82 04/19/2021    MCV 80.7 04/19/2021    MCH 24.8 04/19/2021    MCHC 30.8 04/19/2021    RDW 20.1 04/19/2021    SEGSPCT 64 05/01/2013    LYMPHOPCT 6.9 04/19/2021    MONOPCT 9.8 04/19/2021    MYELOPCT 1.0 02/24/2021    BASOPCT 0.2 04/19/2021    MONOSABS 0.51 04/19/2021    LYMPHSABS 0.36 04/19/2021    EOSABS 0.03 04/19/2021    BASOSABS 0.01 04/19/2021     CMP:    Lab Results   Component Value Date     04/19/2021    K 3.8 04/19/2021    K 4.0 04/15/2021    CL 92 04/19/2021    CO2 25 04/19/2021    BUN 17 04/19/2021    CREATININE 0.5 04/19/2021    GFRAA >60 04/19/2021    LABGLOM >60 04/19/2021    GLUCOSE 107 04/19/2021    PROT 5.3 04/19/2021    LABALBU 2.8 04/19/2021    CALCIUM 8.4 04/19/2021    BILITOT 2.2 04/19/2021    ALKPHOS 74 04/19/2021    AST 41 04/19/2021    ALT 10 04/19/2021       Current Facility-Administered Medications:    lenvantinib for progressive HCC.   -Discharge planning soon.  He is feeling much better.  -We will continue full supportive care    Electronically signed by JUAN ALBERTO Martinez NP on 4/19/2021 at 12:20 PM

## 2021-04-19 NOTE — PROGRESS NOTES
Occupational Therapy  Date:4/19/2021  Patient Name: Arthur Moreno  Room: 6642/8647-G     Occupational Therapy (OT) order received and patient's medical record reviewed; patient declined completion of OT evaluation noting that he is independent with ADLs and functional mobility. Patient stated that family/friends can assist him with IADLs, if needed, upon discharge and denied having concerns about returning home. No OT evaluation completed, per patient preference. JAYCEE Argueta/L  License Number: [id number]

## 2021-04-19 NOTE — PATIENT CARE CONFERENCE
[facility] Quality Flow/Interdisciplinary Rounds Progress Note        Quality Flow Rounds held on April 19, 2021    Disciplines Attending:  Bedside Nurse, ,  and Nursing Unit Leadership    Nish Castaneda was admitted on 4/15/2021  3:47 PM    Anticipated Discharge Date:  Expected Discharge Date: 04/18/21    Disposition:    Misha Score:  Misha Scale Score: 21    Readmission Risk              Risk of Unplanned Readmission:        23           Discussed patient goal for the day, patient clinical progression, and barriers to discharge.   The following Goal(s) of the Day/Commitment(s) have been identified:  Diagnostics - Report Results      Dora Verde  April 19, 2021

## 2021-04-19 NOTE — PROGRESS NOTES
Physical Therapy    Facility/Department: [facility]      NAME: Rose Jacobo  : 1955  MRN: [id number]    Date of Service: 2021    Order received for PT evaluation. Pt states he is up independently in room. Denies any PT needs at this time.  Will discontinue PT per pts request.   Yesenia PT [id number]

## 2021-04-20 NOTE — PROGRESS NOTES
Nephrology Progress Note      Events Reviewed. Subjective: We are following Mr. Villanueva Hemp for hyponatremia.   He reports no new complaints , states feels a lot better  Blood pressure trend is better today   Current Medications:    sodium chloride tablet 1 g, BID WC  0.9 % sodium chloride infusion, PRN  cefTRIAXone (ROCEPHIN) 1,000 mg in sterile water 10 mL IV syringe, Q24H  lactulose (CHRONULAC) 10 GM/15ML solution 20 g, BID  influenza A&B vaccine (FLUAD QUADRIVALENT) injection 0.5 mL, Once  mirtazapine (REMERON) tablet 15 mg, Nightly  sennosides-docusate sodium (SENOKOT-S) 8.6-50 MG tablet 2 tablet, Daily PRN  sucralfate (CARAFATE) tablet 1 g, 4x Daily  traMADol (ULTRAM) tablet 50 mg, Q8H      Physical exam:   Constitutional:    Vitals: BP (!) 102/52   Pulse 96   Temp 97.9 °F (36.6 °C) (Oral)   Resp 14   Ht 6' (1.829 m)   Wt 143 lb 14.4 oz (65.3 kg)   SpO2 97%   BMI 19.52 kg/m²      Cachectic looking  male, lying in bed, appears in no acute distress, alert awake and oriented x3  Skin: no rash, turgor wnl  Heent:  eomi, mmm  Neck: no bruits or jvd noted  Cardiovascular:  S1, S2 without m/r/g  Respiratory: CTA B without w/r/r  Abdomen:  +bs, soft, abdomen is diffusely distended, tender, abdominal catheter in place   Ext: No lower extremity edema  Psychiatric: mood and affect appropriate  Musculoskeletal:  Rom, muscular strength intact    Data:   Labs:  CBC:   Lab Results   Component Value Date    WBC 3.8 04/20/2021    RBC 3.30 04/20/2021    RBC  09/01/2016      RBC           [id number]    transfused   09/02/16  00:19  SCC    RBC  09/01/2016      RBC           [id number]    transfused   09/02/16  19:47  SCC    HGB 8.4 04/20/2021    HCT 26.7 04/20/2021    MCV 80.9 04/20/2021    MCH 25.5 04/20/2021    MCHC 31.5 04/20/2021    RDW 20.7 04/20/2021    PLT 96 04/20/2021    MPV 10.0 04/20/2021     CMP:    Lab Results   Component Value Date     04/20/2021    K 3.7 04/20/2021    K 4.0 04/15/2021    CL 93 04/20/2021    CO2 25 04/20/2021    BUN 17 04/20/2021    CREATININE 0.5 04/20/2021    GFRAA >60 04/20/2021    LABGLOM >60 04/20/2021    GLUCOSE 104 04/20/2021    PROT 5.2 04/20/2021    LABALBU 2.6 04/20/2021    CALCIUM 8.4 04/20/2021    BILITOT 2.1 04/20/2021    ALKPHOS 101 04/20/2021    AST 44 04/20/2021    ALT 13 04/20/2021     BMP:    Lab Results   Component Value Date     04/20/2021    K 3.7 04/20/2021    K 4.0 04/15/2021    CL 93 04/20/2021    CO2 25 04/20/2021    BUN 17 04/20/2021    LABALBU 2.6 04/20/2021    CREATININE 0.5 04/20/2021    CALCIUM 8.4 04/20/2021    GFRAA >60 04/20/2021    LABGLOM >60 04/20/2021    GLUCOSE 104 04/20/2021     Calcium:    Lab Results   Component Value Date    CALCIUM 8.4 04/20/2021     Phosphorus:    Lab Results   Component Value Date    PHOS 3.5 10/18/2016     Uric Acid:  No results found for: URICACID  U/A:  No results found for: Gama He, PHUR, LABCAST, [address], RBCUA, MUCUS, TRICHOMONAS, YEAST, BACTERIA, CLARITYU, SPECGRAV, LEUKOCYTESUR, UROBILINOGEN, [place], [address] [place], AMORPHOUS     Results for Harsh Valentin (MRN [id number]) as of 4/17/2021 13:28   Ref. Range 4/16/2021 17:28   Chloride Latest Ref Range: Not Established mmol/L <20   Creatinine, Ur Latest Ref Range: 40 - 278 mg/dL 146   Osmolality, Ur Latest Ref Range: 300 - 900 mOsm/kg 823   Potassium, Ur Latest Ref Range: Not Established mmol/L 84.1   Sodium, Ur Latest Ref Range: Not Established mmol/L <20   Urea Nitrogen, Ur Latest Ref Range: 800 - 1666 mg/dL 1126       Imaging:  Ct Abdomen Pelvis W Iv Contrast Additional Contrast? None    Result Date: 4/15/2021  EXAMINATION: CT OF THE ABDOMEN AND PELVIS WITH CONTRAST 4/15/2021 6:07 pm TECHNIQUE: CT of the abdomen and pelvis was performed with the administration of intravenous contrast. Multiplanar reformatted images are provided for review.  Dose modulation, iterative reconstruction, and/or weight based adjustment of the mA/kV was utilized to reduce the radiation dose to as low as reasonably achievable. COMPARISON: 02/01/2021. HISTORY: ORDERING SYSTEM PROVIDED HISTORY: IR paracentesis drain placed in the left abdomen, history of cirrhosis TECHNOLOGIST PROVIDED HISTORY: Additional Contrast?->None Reason for exam:->IR paracentesis drain placed in the left abdomen, history of cirrhosis Decision Support Exception->Emergency Medical Condition (MA) FINDINGS: The lung bases demonstrate extensive/ innumerable pulmonary nodules and masses with the largest 1 in the left lower lobe measuring 4.2 x 3 cm and the largest 1 in the right lower lobe measuring 3.2 x 2.6 cm with significant progression in the disease. There is atelectasis and pleural effusion in the left lower lobe. Liver is cirrhotic with nodular border. A previously noted necrotic mass in the right hepatic lobe currently measures 8.3 x 3.3 cm which is somewhat increased in size. There may be tumor invasion with occlusion of the right portal vein which is thrombosed. There is large amount of ascites. Gallbladder is partially distended with gallstones and wall thickening. There is splenomegaly with spleen measuring 19 cm. There is prominence of the portal vein concerning for portal venous hypertension with venous varices at the GE junction and adair of the liver as well as splenic hilum. Pancreas, the adrenals and the kidneys are normal.  There is extensive metastatic nodules in the mesentery with nodules and masses throughout the abdomen pelvis with largest 1 in the abdomen measuring up to 5.5 x 4.1 cm. A large necrotic mass in the pelvis in the rectovesical space measuring 10 x 8 cm with mass effect on the bladder is noted. There is nodularity of the omentum concerning for omental caking/peritoneal carcinomatosis. Subcutaneous mass in the anterior abdominal wall measuring 2.3 x 2.8 cm is noted. Some small retroperitoneal adenopathy is also noted.   There is interval placement of a I had catheter in the pelvis with small amount of pneumoperitoneum with air anteriorly likely resultant from the procedure. Pelvis. Bladder is partially distended with the nodularity associated with the bladder wall, probably metastasis to the bladder wall. The previously described the necrotic mass in the pelvis/rectovesical space is noted. Malignant ascites is noted. There is constipation. Appendix is normal.     Interval placement of a drainage catheter in the left lower quadrant/pelvis with small amount of pneumoperitoneum likely related to procedure. Hepatic cirrhosis with splenomegaly and portal venous hypertension with venous varices and large amount of ascites. Progressive widespread metastatic malignancy with progressive widespread pulmonary metastasis, progressive necrotic mass lesion in the right hepatic lobe and peritoneal carcinomatosis with the extensive mesenteric and pelvic necrotic masses and omental caking. There may be malignant ascites. There may be tumor invasion into the right portal vein. Partially distended gallbladder with gallstones and wall thickening which could be either due to cholecystitis versus ascites. Ir Us Guided Paracentesis    Result Date: 3/24/2021  Patient MRN:  [id number] : 1955 Age: 77 years Gender: Male Order Date:  3/24/2021 8:51 AM EXAM: IR US GUIDED PARACENTESIS NUMBER OF IMAGES:  2 INDICATION: C22.0 Hepatocellular carcinoma ([address] paracentesis What reading provider will be dictating this exam?->[facility] PARACENTESIS: Ultrasound was performed demonstrating ascites. Routine ultrasound and Doppler ultrasound were used. Using direct real-time ultrasound imaging  access was obtained. An image was stored and copy was transmitted to PACS. After obtaining informed consent and after the routine sterile prep and drape and after the administration of a local anesthesia, a system of needles and cannulas was guided by ultrasound control into the peritoneal cavity. Subsequently with real-time guidance a catheter was inserted. Peritoneal access was achieved. The needle was visualized with ultrasound in real-time in position within the peritoneum. The needle was seen within the peritoneum with ultrasound in real-time in position and ascites fluid. The catheter was removed at the end of the procedure. The patient tolerated the procedure well. There were no complications. The patient was released in stable condition. Time out occurred at 09 53 hours. A total of 6100 cc of colored fluid was removed. Successful paracentesis. Ir Guided Tunneled Intraperitoneal Cath W Or Wo Contrast Perc    Result Date: 2021  NUMBER OF IMAGES: Patient MRN:  [id number] : 1955 Age: 77 years Gender: Male Order Date:  2021 11:37 AM EXAM: IR GUIDED TUNNELED INTRAPERITONEAL CATH W OR WO CONTRAST PERC NUMBER OF IMAGES:  4 INDICATION: C22.0 Hepatocellular carcinoma ([facility] [address] placement of plexus catheter for recurrent ascites What reading provider will be dictating this exam?->[facility] COMPARISON: None Insertion of a permanent peritoneal drainage catheter with tunnel creation: After obtaining informed consent and following the routine sterile prep and drape a combination of ultrasound and fluoroscopy were utilized to place a needle into the peritoneal cavity and ascitic fluid was obtained. Subsequently after the administration of local anesthesia a tunnel tract was created and dilated. Two separate incisions were made. Routine ultrasound and Doppler ultrasound were used. Using direct real-time ultrasound imaging peritoneal access was obtained. An image was stored and copy was transmitted to PACS. Subsequently with real-time guidance a needle was inserted intravascular and through the needle a wire. The needle tip was visualized with real time guidance as it was placed into the peritoneal space.  A tunneled catheter was subsequently advanced from the first incision into the second incision and subsequently a catheter was advanced into the peritoneal space. Confirmation of position was confirmed under fluoroscopic control. Time out occurred at 1148 hours. Patient received 1 mg of Versed, 50 mcg of fentanyl and local anesthesia and was monitored for 30 minutes or less by a registered nurse. Patient received 12 seconds fluoroscopy. Subsequently ascitic fluid was drained. The patient tolerated the procedure well. The skin was sutured with 3-0 nylon. The drain was sutured in place. The cuff was placed appropriately. Successful uncomplicated placement of a Pleurx catheter for a permanent peritoneal catheter drainage. Results for Rita Gupta (MRN [id number]) as of 4/18/2021 12:19   Ref. Range 4/16/2021 17:28   Chloride Latest Ref Range: Not Established mmol/L <20   Creatinine, Ur Latest Ref Range: 40 - 278 mg/dL 146   Osmolality, Ur Latest Ref Range: 300 - 900 mOsm/kg 823   Potassium, Ur Latest Ref Range: Not Established mmol/L 84.1   Sodium, Ur Latest Ref Range: Not Established mmol/L <20   Urea Nitrogen, Ur Latest Ref Range: 800 - 1666 mg/dL 1126     Assessment  1. Hypotonic hyponatremia likely secondary to poor solute intake, excess free water intake. He does not have any neurological symptoms at this time. Getting better./  2. Metastatic hepatocellular carcinoma with possible peritoneal carcinomatosis  3. Severe hypoalbuminemia from liver disease  4. Malignant ascites needing daily drainage, 5 L removed and Albumin Given  5. Anemia, multifactorial from underlying malignancy, chronic disease    Plan  1. Continue with 1 L fluid restriction per day  2. Advised to take at least 3 g salt diet and adequate protein at least 1 g/kg body weight  3. Continue with salt tabs 1 g by mouth twice a day for one week  4.  Obtain a BMP check in 1 week, follow-up with primary care physician in one to 2 weeks      Electronically signed by Naila Bashir MD on 4/20/2021 at 4:38 PM

## 2021-04-20 NOTE — PROGRESS NOTES
PROGRESS NOTE    Patient Presents with/Seen in Consultation For      *ascites with metastaitic liver ca  CHIEF COMPLAINT:  Abdominal distention and concerns for post op complications    Subjective:     Patient seen laying in bed eating lunch. States he feels much better\" better than I have in months\". Tolerating diet, denies abdominal pain nausea or vomiting. BM this a.m. denies melena or hematochezia. Plan of care discussed with patient verbalizing agreement. Review of Systems  Aside from what was mentioned in the PMH and HPI, essentially unremarkable, all others negative. Objective:     BP (!) 102/52   Pulse 96   Temp 97.9 °F (36.6 °C) (Oral)   Resp 14   Ht 6' (1.829 m)   Wt 143 lb 14.4 oz (65.3 kg)   SpO2 97%   BMI 19.52 kg/m²     General appearance: alert, awake, pale, thin, ill appearing, sitting in bed eating lunch, and cooperative  Eyes: conjunctiva pale, sclera anicteric. PERRL.   Lungs: diminished to auscultation bilaterally  Heart: regular rate and rhythm, no murmur, 2+ pulses; no edema  Abdomen: Slightly-distended, non-tender; + ascites, PleurX dressing CDI; bowel sounds normal; no masses,  no organomegaly  Extremities: extremities without edema  Pulses: 2+ and symmetric  Skin: Skin color pale, texture, turgor normal.   Neurologic: Grossly normal    sodium chloride tablet 1 g, BID WC  0.9 % sodium chloride infusion, PRN  cefTRIAXone (ROCEPHIN) 1,000 mg in sterile water 10 mL IV syringe, Q24H  lactulose (CHRONULAC) 10 GM/15ML solution 20 g, BID  influenza A&B vaccine (FLUAD QUADRIVALENT) injection 0.5 mL, Once  mirtazapine (REMERON) tablet 15 mg, Nightly  sennosides-docusate sodium (SENOKOT-S) 8.6-50 MG tablet 2 tablet, Daily PRN  sucralfate (CARAFATE) tablet 1 g, 4x Daily  traMADol (ULTRAM) tablet 50 mg, Q8H         Data Review  CBC:   Lab Results   Component Value Date    WBC 3.8 04/20/2021    RBC 3.30 04/20/2021    RBC  09/01/2016      RBC           [id number]    transfused 09/02/16  00:19  SCC    RBC  09/01/2016      RBC           [id number]    transfused   09/02/16  19:47  SCC    HGB 8.4 04/20/2021    HCT 26.7 04/20/2021    MCV 80.9 04/20/2021    MCH 25.5 04/20/2021    MCHC 31.5 04/20/2021    RDW 20.7 04/20/2021    PLT 96 04/20/2021    MPV 10.0 04/20/2021     CMP:    Lab Results   Component Value Date     04/20/2021    K 3.7 04/20/2021    K 4.0 04/15/2021    CL 93 04/20/2021    CO2 25 04/20/2021    BUN 17 04/20/2021    CREATININE 0.5 04/20/2021    GFRAA >60 04/20/2021    LABGLOM >60 04/20/2021    GLUCOSE 104 04/20/2021    PROT 5.2 04/20/2021    LABALBU 2.6 04/20/2021    CALCIUM 8.4 04/20/2021    BILITOT 2.1 04/20/2021    ALKPHOS 101 04/20/2021    AST 44 04/20/2021    ALT 13 04/20/2021     Hepatic Function Panel:    Lab Results   Component Value Date    ALKPHOS 101 04/20/2021    ALT 13 04/20/2021    AST 44 04/20/2021    PROT 5.2 04/20/2021    BILITOT 2.1 04/20/2021    BILIDIR 1.1 04/15/2021    IBILI 0.9 04/15/2021    LABALBU 2.6 04/20/2021     No components found for: CHLPL    Lab Results   Component Value Date    TRIG 72 04/30/2013       Lab Results   Component Value Date    HDL 78.8 04/30/2013       Lab Results   Component Value Date    LDLCALC 46 04/30/2013     No results found for: LABVLDL   PT/INR:    Lab Results   Component Value Date    PROTIME 14.9 04/20/2021    INR 1.4 04/20/2021     IRON:    Lab Results   Component Value Date    IRON 31 10/18/2016     Iron Saturation:  No components found for: PERCENTFE  FERRITIN:    Lab Results   Component Value Date    FERRITIN 22 10/18/2016         Assessment:     Active Problems:  ? Hepatocellular carcinoma s/p Y-90 treatments 12/16/20, last chemo 3/11/21 now with malignant ascites and widespread metastatic disease - hem/onc following  ? Abdominal pain  ? PleurX catheter   ? Elevated LFTs, secondary to cirrhosis and liver CA  ? Cirrhosis of liver   ? Hyperammonemia  ?  Anemia, normocytic,

## 2021-04-20 NOTE — PROGRESS NOTES
Subjective:  Patient is awake and alert laying in bed. He is going to be discharged today. He will follow-up with us in the next week. He is eating and drinking. Catheter site is no longer leaking. Objective:    BP (!) 102/52   Pulse 96   Temp 97.9 °F (36.6 °C) (Oral)   Resp 14   Ht 6' (1.829 m)   Wt 143 lb 14.4 oz (65.3 kg)   SpO2 97%   BMI 19.52 kg/m²     General: NAD, talkative,   HEENT: No thrush or mucositis, EOMI, PERRLA  Heart:  RRR, no murmurs, gallops, or rubs.   Lungs:  CTA bilaterally, no wheeze, rales or rhonchi  Abd: bowel sounds present, tender, distended, no masses, catheter c/d/i  Extrem:  No clubbing, cyanosis, or edema  Lymphatics: No palpable adenopathy in cervical and supraclavicular regions  Skin: Intact, no petechia or purpura    CBC with Differential:    Lab Results   Component Value Date    WBC 3.8 04/20/2021    RBC 3.30 04/20/2021    RBC  09/01/2016      RBC           [id number]    transfused   09/02/16  00:19  SCC    RBC  09/01/2016      RBC           [id number]    transfused   09/02/16  19:47  SCC    HGB 8.4 04/20/2021    HCT 26.7 04/20/2021    PLT 96 04/20/2021    MCV 80.9 04/20/2021    MCH 25.5 04/20/2021    MCHC 31.5 04/20/2021    RDW 20.7 04/20/2021    SEGSPCT 64 05/01/2013    LYMPHOPCT 10.0 04/20/2021    MONOPCT 13.1 04/20/2021    MYELOPCT 1.0 02/24/2021    BASOPCT 0.3 04/20/2021    MONOSABS 0.50 04/20/2021    LYMPHSABS 0.38 04/20/2021    EOSABS 0.04 04/20/2021    BASOSABS 0.01 04/20/2021     CMP:    Lab Results   Component Value Date     04/20/2021    K 3.7 04/20/2021    K 4.0 04/15/2021    CL 93 04/20/2021    CO2 25 04/20/2021    BUN 17 04/20/2021    CREATININE 0.5 04/20/2021    GFRAA >60 04/20/2021    LABGLOM >60 04/20/2021    GLUCOSE 104 04/20/2021    PROT 5.2 04/20/2021    LABALBU 2.6 04/20/2021    CALCIUM 8.4 04/20/2021    BILITOT 2.1 04/20/2021    ALKPHOS 101 04/20/2021    AST 44 04/20/2021    ALT 13 04/20/2021       Current Facility-Administered Medications:     sodium chloride tablet 1 g, 1 g, Oral, BID WC, Manohar Archer MD, 1 g at 04/20/21 0951    0.9 % sodium chloride infusion, , Intravenous, PRN, Keyonna Tucker MD    cefTRIAXone (ROCEPHIN) 1,000 mg in sterile water 10 mL IV syringe, 1,000 mg, Intravenous, Q24H, Angel Mis, APRN - CNS, 1,000 mg at 04/19/21 1623    lactulose (CHRONULAC) 10 GM/15ML solution 20 g, 20 g, Oral, BID, Catherine Rocks, DO, 20 g at 04/20/21 6953    influenza A&B vaccine (FLUAD QUADRIVALENT) injection 0.5 mL, 0.5 mL, Intramuscular, Once, [facility], DO    mirtazapine (REMERON) tablet 15 mg, 15 mg, Oral, Nightly, Catherine Rocks, DO    sennosides-docusate sodium (SENOKOT-S) 8.6-50 MG tablet 2 tablet, 2 tablet, Oral, Daily PRN, [facility], DO, 2 tablet at 04/18/21 1649    sucralfate (CARAFATE) tablet 1 g, 1 g, Oral, 4x Daily, Catherine Rocks, DO, 1 g at 04/20/21 3156    traMADol (ULTRAM) tablet 50 mg, 50 mg, Oral, Q8H, [facility], DO, 50 mg at 04/20/21 7824    Assessment:    Active Problems:    Pancytopenia (HCC)    Electrolyte imbalance    Ascites    Hyperbilirubinemia    Constipation    Hyperammonemia (HCC)    Esophageal varices (HCC)    [facility]  Resolved Problems:    * No resolved hospital problems. *    78-year-old known to  with Hepatocellular carcinoma. Treated with Y-90 on 12/16/20. Started Tecentriq and Bevacizumab on 12/17/20 with good response initially. Last treatment was on 3/11/21 then recently developed progression in lungs, peritoneum with ascites and liver s/p peritoneal catheter placement last week. Admitted due to fluid draining around peritoneal catheter. Anemia partially due to bloody drainage around peritoneal catheter and blood ascites.       Plan:  -Hemoglobin stable, no indication for transfusion.   -Sutures were replaced around ABD pleurex-  -Renal management for hyponatremia.   -Plan for outpatient oral lenvantinib for

## 2021-04-20 NOTE — CARE COORDINATION
Social Work discharge planning   SW met with pt for continued discharge planning. Pt is active with [facility] for PleurX Catheter care. Pt said his plan remains home alone, but that he has help from 2 separate neighbors, a friend, and 2 sons & DILs that live in [place]. He reports being independent with ambulation, is getting a bsc from [address] (ordered 4/16/21 per previous note). Pt will need order to \"resume hhc\" for [employer]. He has PCP, Dr Emilie Nieto and uses [facility] on [address] [place] [address].    Electronically signed by Leandra Estrella on 4/20/2021 at 11:10 AM

## 2021-04-20 NOTE — PROGRESS NOTES
Admit Date: 4/15/2021  Hospital day 5    Subjective:     Patient resting in bed in NAD. Feeling well. Says he had BM and belly feels less swollen. Denies CP, SOB. Eating well. Objective:       I/O last 3 completed shifts:   In: 240 [P.O.:240]  Out: -       /60 Comment: manual  Pulse 106   Temp 97.8 °F (36.6 °C) (Oral)   Resp 18   Ht 6' (1.829 m)   Wt 143 lb 14.4 oz (65.3 kg)   SpO2 99%   BMI 19.52 kg/m²   General appearance: alert, appears stated age, cachectic, cooperative and no distress  Lungs: clear to auscultation bilaterally  Heart: regular rate and rhythm, S1, S2 normal, no murmur, click, rub or gallop  Abdomen: soft, non-tender; bowel sounds normal; no masses,  no organomegaly  Extremities: edema 1+ RLE  Skin: pallor       Data ReviewCBC:       Recent Results (from the past 24 hour(s))   Comprehensive metabolic panel    Collection Time: 04/20/21  4:56 AM   Result Value Ref Range    Sodium 127 (L) 132 - 146 mmol/L    Potassium 3.7 3.5 - 5.0 mmol/L    Chloride 93 (L) 98 - 107 mmol/L    CO2 25 22 - 29 mmol/L    Anion Gap 9 7 - 16 mmol/L    Glucose 104 (H) 74 - 99 mg/dL    BUN 17 8 - 23 mg/dL    CREATININE 0.5 (L) 0.7 - 1.2 mg/dL    GFR Non-African American >60 >=60 mL/min/1.73    GFR African American >60     Calcium 8.4 (L) 8.6 - 10.2 mg/dL    Total Protein 5.2 (L) 6.4 - 8.3 g/dL    Albumin 2.6 (L) 3.5 - 5.2 g/dL    Total Bilirubin 2.1 (H) 0.0 - 1.2 mg/dL    Alkaline Phosphatase 101 40 - 129 U/L    ALT 13 0 - 40 U/L    AST 44 (H) 0 - 39 U/L   CBC WITH AUTO DIFFERENTIAL    Collection Time: 04/20/21  4:56 AM   Result Value Ref Range    WBC 3.8 (L) 4.5 - 11.5 E9/L    RBC 3.30 (L) 3.80 - 5.80 E12/L    Hemoglobin 8.4 (L) 12.5 - 16.5 g/dL    Hematocrit 26.7 (L) 37.0 - 54.0 %    MCV 80.9 80.0 - 99.9 fL    MCH 25.5 (L) 26.0 - 35.0 pg    MCHC 31.5 (L) 32.0 - 34.5 %    RDW 20.7 (H) 11.5 - 15.0 fL    Platelets 96 (L) 159 - 450 E9/L    MPV 10.0 7.0 - 12.0 fL    Neutrophils % 75.1 43.0 - 80.0 %    Immature Granulocytes % 0.5 0.0 - 5.0 %    Lymphocytes % 10.0 (L) 20.0 - 42.0 %    Monocytes % 13.1 (H) 2.0 - 12.0 %    Eosinophils % 1.0 0.0 - 6.0 %    Basophils % 0.3 0.0 - 2.0 %    Neutrophils Absolute 2.86 1.80 - 7.30 E9/L    Immature Granulocytes # 0.02 E9/L    Lymphocytes Absolute 0.38 (L) 1.50 - 4.00 E9/L    Monocytes Absolute 0.50 0.10 - 0.95 E9/L    Eosinophils Absolute 0.04 (L) 0.05 - 0.50 E9/L    Basophils Absolute 0.01 0.00 - 0.20 E9/L    Anisocytosis 1+     Polychromasia 1+     Hypochromia 1+     Poikilocytes 1+     Schistocytes . 1+     Ovalocytes 1+     Target Cells 1+    Lactic acid, plasma    Collection Time: 04/20/21  4:56 AM   Result Value Ref Range    Lactic Acid 2.5 (H) 0.5 - 2.2 mmol/L   Protime-INR    Collection Time: 04/20/21  4:56 AM   Result Value Ref Range    Protime 14.9 (H) 9.3 - 12.4 sec    INR 1.4    Ammonia    Collection Time: 04/20/21  4:56 AM   Result Value Ref Range    Ammonia 36.2 16.0 - 60.0 umol/L   Platelet Confirmation    Collection Time: 04/20/21  4:56 AM   Result Value Ref Range    Platelet Confirmation CONFIRMED            Assessment:     Active Problems:    Pancytopenia (HCC)    Electrolyte imbalance    Ascites    Hyperbilirubinemia    Constipation    Hyperammonemia (HCC)    Esophageal varices (HCC)    Hepatocellular carcinoma (HCC)  Resolved Problems:    * No resolved hospital problems. *      Plan:     Hematology on board. Drain site to be relocated. Renal managing NA+/ fluids. Follow labs closely. Plan to discharge when lytes stabilize. Today if ok with renal and GI.

## 2021-04-20 NOTE — PATIENT CARE CONFERENCE
P Quality Flow/Interdisciplinary Rounds Progress Note        Quality Flow Rounds held on April 20, 2021    Disciplines Attending:  Bedside Nurse, ,  and Nursing Unit Leadership    Hans Bates was admitted on 4/15/2021  3:47 PM    Anticipated Discharge Date:  Expected Discharge Date: 04/20/21    Disposition:    Misha Score:  Misha Scale Score: 21    Readmission Risk              Risk of Unplanned Readmission:        23           Discussed patient goal for the day, patient clinical progression, and barriers to discharge.   The following Goal(s) of the Day/Commitment(s) have been identified:  Discharge - Obtain Order- planning      Nuvia Martell  April 20, 2021

## 2021-04-20 NOTE — CARE COORDINATION
04/20/21 1417   IMM Letter   IMM Letter given to Patient/Family/Significant other/Guardian/POA/by: Jamia Whittaker RN   IMM Letter date given: 04/20/21   IMM Letter time given: 4331   Important message from Medicare delivered to patient. A signed copy is placed in patient's lite chart. Another copy left with patient.  Jamia Whittaker RN

## 2021-04-20 NOTE — PROGRESS NOTES
Spoke to Dr. Rojas Wu ok to DC today.     Electronically signed by Navya Nicholson RN on 4/20/2021 at 12:54 PM

## 2021-04-20 NOTE — PROGRESS NOTES
Call placed to Dr. Eder Mejias regarding DC. Dr. Eder Mejias to call back with orders.     Electronically signed by Neno Modi RN on 4/20/2021 at 11:37 AM

## 2021-04-22 NOTE — DISCHARGE SUMMARY
consulted regarding placing PleurX catheter drain  placement, originally the thought was to have catheter removed and  repositioned; however, the catheter was simply anchored appropriately  and drainage was continued without complications. HISTORY AND PHYSICAL FINDINGS:  This is a 24-year-old male presented to  [facility] Emergency Room at the request of his home health  nurse after she noticed an increasing amount of abdominal distention and  swelling. She recently had paracentesis catheter placed regarding his  recurrent bouts of ascites; however, increasing swelling was noted even  after paracentesis was performed. He therefore, opted to come to the  emergency room for further evaluation. Denied having had any chest  pain, shortness of breath, headaches, or dizziness. Did complain of  constipation with hard stools as well as decreasing amounts of  urination. CT scan confirmed ascites and also noted interval placement  of drainage catheter in the left lower quadrant with small amount of  pneumoperitoneum, hepatic cirrhosis with splenomegaly, and portal vein  hypertension was once again noted as well as large amount of _____  ascites, progressive widespread metastasis to the lungs as well as  progressive necrotic mass lesion in the right hepatic lobe with  peritoneal carcinomatosis and extensive mesenteric and pelvic necrotic  masses with omental caking was also noted. Laboratory studies were  performed revealing abnormally low sodium of 122 with chloride low at  88. BUN and creatinine were stable at 21 and 0.6 respectively. Lactic  acid high at 3.3. Bilirubin elevated at 2.0. Ammonia slightly high at  53. CBC showing a pancytopenia with white cell count low at 3.1,  hemoglobin low at 8.7, and platelets low at 93. HOSPITAL COURSE AND TREATMENT:  The patient's hospital course was fairly  unremarkable. He did look very weak and cachectic upon admission.    However, as paracentesis was performed the patient's comfort level  increased and symptoms began to improve. Lactulose ordered for  constipation and elevated ammonia level. This did appear to improve his  constipation and ammonia level stabilized. Sodium continued to improve  with saline administration, however, still low at the time of discharge  at 127. The patient was offered hospice however declined it at the  present time. This will be addressed in the future if necessary. As  stated above, IR was consulted, PleurX drain was anchored more  appropriately, and paracentesis was continued without complications. The patient was followed closely throughout his stay and was noted to  have continued improvement with each day and at the time of discharge,  he was stating he was feeling much better. He denied any chest pain or  shortness of breath. His abdominal pain was much improved and abdomen  was much less distended. Vital signs were stable as were his laboratory  studies and therefore it was felt that he was stable to be discharged to  home. CONDITION ON DISCHARGE:  Stable. DISCHARGE INSTRUCTIONS:  He would continue routine home medications with  the addition of lactulose. He was asked to follow up in my office in  one week's time, at which point we will reassess his clinical symptoms  and determine if any further medicine changes were necessary. Recheck  his laboratory studies at that time. PRIMARY DIAGNOSES:  1. Recurrent ascites. 2.  Hepatocellular carcinoma. 3.  Hyperammonemia, resolved. 4.  Hyperbilirubinemia. 5.  Constipation, improved. 6.  Electrolyte imbalance, improved. 7.  Pancytopenia. 8.  Esophageal varices. Total time spent with the patient including discharge, discharge  summary, and instructions greater than or equal to half hour.         Rhoderick Mohs, DO    D: 04/21/2021 9:55:41       T: 04/21/2021 10:04:47     [email or web address]  Job#: [id number]     Doc#: [id number]    CC:

## 2021-04-27 PROBLEM — N17.9 AKI (ACUTE KIDNEY INJURY) (HCC): Status: ACTIVE | Noted: 2021-01-01

## 2021-04-27 NOTE — ED PROVIDER NOTES
Department of Emergency Medicine   ED  Provider Note  Admit Date/RoomTime: 4/27/2021  4:09 PM  ED Room: 22/22 4/27/21  4:50 PM EDT    History of Present Illness:  Chief Complaint   Patient presents with    Dehydration     chemo pt, has ostomy, in hospice, not eating and drinking        Mickiel Sers is a 77 y.o. male presenting to the ED for dehydration, beginning today. The complaint has been persistent, moderate in severity, and worsened by nothing. Patient with hepatocellular carcinoma with widespread metastasis, pancytopenia, just admitted 2 weeks ago for electrolyte disturbances and hyperbilirubinemia and ascites. Patient with current ascitic drain in place both significant leaking around the site. He reports he recently started a new chemotherapy and while he is not having any vomiting he has no appetite he is also had all of his teeth pulled as not been able to eat for that reason as well. Patient reports almost no oral intake in the past couple days become more weak and dehydrated. Apparently today the hospice was visiting patient had an episode of disorientation and hypotension. Family was called with patient's recent left light abnormalities at last hospitalization was brought to the hospital in case he is again having likely abnormalities causing his symptoms. Patient is trialing hospice but is not [facility] yet as he is still on chemotherapy. No current confusion, chest pain, shortness of breath, abdominal pain, nausea, vomiting or any other complaints. Review of Systems:   Pertinent positives and negatives are stated within HPI, all other systems reviewed and are negative.   Review of Systems         --------------------------------------------- PAST HISTORY ---------------------------------------------  Past Medical History:  has a past medical history of Cirrhosis ([facility] [address] Esophageal varices ([facility] [address] GI bleed, Hepatitis C, Hepatocellular carcinoma ([facility] [address] and Hypertension. Past Surgical History:  has a past surgical history that includes Leg Surgery; Endoscopy, colon, diagnostic (04/30/2013); Upper gastrointestinal endoscopy (09/02/2016); Upper gastrointestinal endoscopy (03/27/2018); Upper gastrointestinal endoscopy (N/A, 03/27/2018); Upper gastrointestinal endoscopy (N/A, 10/14/2020); CT NEEDLE BIOPSY LIVER PERCUTANEOUS (10/15/2020); IR EMBOLIZATION TUMOR/ORGAN ISCH/INFARC (12/14/2020); IR PORT PLACEMENT > 5 YEARS (12/14/2020); IR EMBOLIZATION TUMOR/ORGAN ISCH/INFARC (12/16/2020); Upper gastrointestinal endoscopy (N/A, 01/18/2021); Paracentesis (Right, 02/08/2021); Paracentesis (Right, 03/24/2021); and IR GUIDED TUNNELED INTRAPERITONEAL CATH W OR WO CONTRAST PERC (4/9/2021). Social History:  reports that he has never smoked. He has never used smokeless tobacco. He reports previous alcohol use. He reports that he does not use drugs. Family History: family history is not on file. The patients home medications have been reviewed. Allergies: Patient has no known allergies. ------------------------- NURSING NOTES AND VITALS REVIEWED ---------------------------   The nursing notes within the ED encounter and vital signs as below have been reviewed. BP 96/60   Pulse 92   Temp 96.9 °F (36.1 °C)   Resp 16   Ht 6' (1.829 m)   Wt 110 lb (49.9 kg)   SpO2 99%   BMI 14.92 kg/m²   Oxygen Saturation Interpretation: Normal      ---------------------------------------------------PHYSICAL EXAM--------------------------------------    Physical Exam  Vitals signs and nursing note reviewed. Constitutional:       Appearance: He is well-developed. NOEL:      Head: Normocephalic and atraumatic. Eyes:      Conjunctiva/sclera: Conjunctivae normal.   Neck:      Musculoskeletal: Normal range of motion and neck supple. Cardiovascular:      Rate and Rhythm: Normal rate and regular rhythm. Heart sounds: Normal heart sounds. No murmur.    Pulmonary: Effort: Pulmonary effort is normal. No respiratory distress. Breath sounds: Normal breath sounds. No wheezing or rales. Abdominal:      General: Bowel sounds are normal.      Palpations: Abdomen is soft. Tenderness: There is no abdominal tenderness. There is no guarding or rebound. Comments: Pleurx catheter to left upper quadrant with serosanguineous drainage on bandage. No visible evidence of infection   Musculoskeletal:         General: No tenderness or deformity. Skin:     General: Skin is warm and dry. Neurological:      Mental Status: He is alert and oriented to person, place, and time. Cranial Nerves: No cranial nerve deficit.       Coordination: Coordination normal.            -------------------------------------------------- RESULTS -------------------------------------------------  All laboratory and radiology results have been personally reviewed by myself   LABS:  Results for orders placed or performed during the hospital encounter of 04/27/21   CBC Auto Differential   Result Value Ref Range    WBC 11.1 4.5 - 11.5 E9/L    RBC 4.50 3.80 - 5.80 E12/L    Hemoglobin 10.9 (L) 12.5 - 16.5 g/dL    Hematocrit 37.5 37.0 - 54.0 %    MCV 83.3 80.0 - 99.9 fL    MCH 24.2 (L) 26.0 - 35.0 pg    MCHC 29.1 (L) 32.0 - 34.5 %    RDW 21.1 (H) 11.5 - 15.0 fL    Platelets 806 014 - 946 E9/L    MPV 10.6 7.0 - 12.0 fL    Neutrophils % 84.8 (H) 43.0 - 80.0 %    Immature Granulocytes % 1.6 0.0 - 5.0 %    Lymphocytes % 5.7 (L) 20.0 - 42.0 %    Monocytes % 7.5 2.0 - 12.0 %    Eosinophils % 0.1 0.0 - 6.0 %    Basophils % 0.3 0.0 - 2.0 %    Neutrophils Absolute 9.44 (H) 1.80 - 7.30 E9/L    Immature Granulocytes # 0.18 E9/L    Lymphocytes Absolute 0.64 (L) 1.50 - 4.00 E9/L    Monocytes Absolute 0.84 0.10 - 0.95 E9/L    Eosinophils Absolute 0.01 (L) 0.05 - 0.50 E9/L    Basophils Absolute 0.03 0.00 - 0.20 E9/L    Toxic Granulation 2+     Vacuolated Neutrophils 1+     Anisocytosis 1+     Polychromasia 1+ Poikilocytes 1+     Yoana Cells 1+     Ovalocytes 1+    Comprehensive Metabolic Panel   Result Value Ref Range    Sodium 127 (L) 132 - 146 mmol/L    Potassium 5.6 (H) 3.5 - 5.0 mmol/L    Chloride 93 (L) 98 - 107 mmol/L    CO2 22 22 - 29 mmol/L    Anion Gap 12 7 - 16 mmol/L    Glucose 90 74 - 99 mg/dL    BUN 41 (H) 6 - 23 mg/dL    CREATININE 1.3 (H) 0.7 - 1.2 mg/dL    GFR Non-African American 55 >=60 mL/min/1.73    GFR African American >60     Calcium 9.1 8.6 - 10.2 mg/dL    Total Protein 6.0 (L) 6.4 - 8.3 g/dL    Albumin 2.7 (L) 3.5 - 5.2 g/dL    Total Bilirubin 3.6 (H) 0.0 - 1.2 mg/dL    Alkaline Phosphatase 88 40 - 129 U/L    ALT 26 0 - 40 U/L    AST 96 (H) 0 - 39 U/L   Magnesium   Result Value Ref Range    Magnesium 2.3 1.6 - 2.6 mg/dL   Ammonia   Result Value Ref Range    Ammonia 16.1 16.0 - 60.0 umol/L   Protime-INR   Result Value Ref Range    Protime 19.7 (H) 9.3 - 12.4 sec    INR 1.8    APTT   Result Value Ref Range    aPTT 40.0 (H) 24.5 - 35.1 sec   Troponin   Result Value Ref Range    Troponin <0.01 0.00 - 0.03 ng/mL   Result Value Ref Range    Ventricular Rate 97 BPM    Atrial Rate 97 BPM    P-R Interval 158 ms    QRS Duration 72 ms    Q-T Interval 366 ms    QTc Calculation (Bazett) 464 ms    P Axis 77 degrees    R Axis 52 degrees    T Axis 86 degrees       RADIOLOGY:  Interpreted by Radiologist.  IR Interventional Radiology Procedure Request    (Results Pending)   IR Interventional Radiology Procedure Request    (Results Pending)       EKG: This EKG is signed by emergency department physician.     Rate: 97  Rhythm: Sinus  Interpretation: Low voltage QRS, st changes inferiorly  Comparison: changes compared to previous EKG 2/26/21      ------------------------------ ED COURSE/MEDICAL DECISION MAKING----------------------  Medications   0.9 % sodium chloride bolus (has no administration in time range)       ED Course as of May 02 0237   Tue Apr 27, 2021   1591 Discussed results and plans with patient and family they are agreeable. [MF]   9960 Discussed case with Dr. Vassie Koyanagi, he will admit patient. [MF]      ED Course User Index  [MF] Ranae Landau, DO          Medical Decision Makin-year-old male who presents for evaluation of fatigue and decreased appetite, decreased oral intake and generalized weakness since starting a new chemotherapy agent for his advanced metastatic cancer. Patient with no current symptoms but found to be in LETA with mild hyperkalemia, given IV fluids as dehydration is likely primary cause. Patient consulting with hospice was not currently hospice and does want treatment. Long conversation with patient and family discussing this and decision was made to make patient DNR-CCA. Patient will be admitted  for further evaluation treatment    Counseling: The emergency provider has spoken with the patient and discussed todays results, in addition to providing specific details for the plan of care and counseling regarding the diagnosis and prognosis. Questions are answered at this time and they are agreeable with the plan.      --------------------------------- IMPRESSION AND DISPOSITION ---------------------------------    IMPRESSION  1. LETA (acute kidney injury) ([facility] [address]    2. Hyperkalemia        DISPOSITION  Disposition: Admit to telemetry  Patient condition is serious      I, Dr. Ranae Landau, am the primary doctor of record. NOTE: This report was transcribed using voice recognition software.  Effort was made to ensure accuracy; however, inadvertent computerized transcription errors may be present         Ranae Landau, DO  21 [phone]

## 2021-04-28 NOTE — PROGRESS NOTES
Blanca Mccarthy was ordered Lenvatinib Mesylate [facility]) which is a nonformulary medication. The patient has indicated that the home supply of this medication will be brought in to the hospital for inpatient use. If the medication has not been administered by 1400 on the following day from the time the order was placed, a pharmacist will follow-up with the nurse of the patient to assess the capability of the patient to bring in the medication. If it is determined that the patient cannot supply the medication and it is not available to be dispensed from the pharmacy, a call will be placed to the ordering provider to discuss alternative options.     Natasha Anaya [facility]  4/27/2021  10:23 PM

## 2021-04-28 NOTE — PLAN OF CARE
Problem: Malnutrition  (NI-5.2)  Goal: Food and/or Nutrient Delivery  Description: Individualized approach for food/nutrient provision.   4/28/2021 1530 by Rossi Clayton RD, LD  Outcome: Met This Shift

## 2021-04-28 NOTE — CARE COORDINATION
Social Work / Discharge Planning : SW met with patient and explained role as discharge planner/ transition of care. Patient was recently discharged home with [facility] 8 days ago. Patient resides in a two story home with his dog. On discharge, he was able to ambulate. Patient admits to being very weak and that his oral chemo is taking a toll on him. Goals at discharge discussed and SW dicussed possible placement but patient did NOT want to commit to that option and wants to wait and see if he feels better during this hospital stay. Patient does have therapy ordered and await therapy input. He does have a three in one commode but denies a walker. Will need fort follow for additional needs. SW discussed family support and if family can increase time with him at home. Patient didn't really respond. Patient PCP is DR Lisandro Nuñez. He does have a mediport in as well bas Pleur-X cath that's established. SW and CM udpated attending and did obtain palliative order. SW to follow.  Electronically signed by MELISSA Rankin on 4/28/21 at 9:51 AM EDT

## 2021-04-28 NOTE — H&P
[address] [address]                              HISTORY AND PHYSICAL    PATIENT NAME: Irene Mejias                      :        1955  MED REC NO:   [id number]                            ROOM:       05  ACCOUNT NO:   [de-identified]                           ADMIT DATE: 2021  PROVIDER:     Sai Lantigua DO    DATE OF ADMISSION:  2021. CHIEF COMPLAINT:  Nausea, vomiting, fatigue, and weakness. HISTORY OF PRESENT ILLNESS:  This is a 71-year-old male, who presented  to [address] [place] [address] Emergency Room with recent onset of nausea,  vomiting, diarrhea, and increasing fatigue. The patient states that  room was recently changed from a liquid chemotherapeutic agent to  capsule and since then has noticed increasing nausea with vomiting. He  came to the emergency room for further evaluation, where laboratory  studies suggested acute kidney injury in that his creatinine was  elevated from baseline to 1.3 with a BUN of 41. He also had significant  electrolyte abnormalities with a sodium of 127 and potassium of 5.6. Bilirubin was also elevated from baseline to 3.6 with low protein at  6.0. Albumin low at 2.7. He is noted to have peripheral edema and  blood pressure was borderline low. Slightly anemic with hemoglobin of  10.9 and in this case, his platelet cell count was stable at 164. The  patient is known to have hepatocellular carcinoma with widespread  metastases and was recently seen in the hospital for ascites and  drainage from his recently placed PleurX catheter. He was discharged in  stable condition, however, since then has had these recent onset of  symptoms. Within the past week, the patient initially had decided on  hospice care, however, declined hospice upon returning to the emergency  room. I was able to speak with him the subsequent morning.   He was free  of any chest pain, shortness of breath, headaches, or dizziness. Denied  any significant abdominal pain, but as stated above, continued to have  nausea and at this point, his vomiting was controlled. He denies having  had any recent melanotic stools. Denied hematochezia. No genitourinary  complaints. PAST MEDICAL HISTORY  Significant for hepatocellular carcinoma with mets, hypertension,  hepatitis C, esophageal varices, liver cirrhosis, and GI bleed. PAST SURGICAL HISTORY:  Multiple upper endoscopies, CT-guided liver  biopsy, embolization of tumor, IR port placement, multiple paracenteses,  and IR-guided tunneled intraperitoneal cath. MEDICATIONS:  Please see chart. ALLERGIES:  No known drug allergies. SOCIAL HISTORY:  He does not currently drink alcohol, however, does have  a significant history. No recreational drug or tobacco abuse noted. FAMILY HISTORY:  No known family history. REVIEW OF SYSTEMS:  Negative unless stated in above history. PHYSICAL EXAMINATION:  VITAL SIGNS:  Temperature 97.7, pulse 100, respirations 14, blood  pressure 78/58, pulse ox 99% room air. GENERAL:  Cachectic-appearing middle-aged elderly man, appears very  fatigued at present, answering questions appropriately, however, seems  to be very distracted at the present time. HEENT:  Atraumatic and normocephalic. Pupils are equal, round, and  reactive to light. Extraocular movements are intact. Nares are patent. External auditory canals are nonerythematous. Pharynx is clear. NECK:  Supple. No bruits. No cervical lymphadenopathy. SKIN:  Diffuse corneal and skin jaundice. Otherwise no bruising,  scarring, cyanosis, or erythema. HEART:  Regular rate and rhythm. No murmurs. LUNGS:  Minimally diminished airflow, otherwise clear to auscultation  bilaterally without wheezes or rales. ABDOMEN:  Mild to moderate distention with apparent ascites.   Otherwise,  no rebound, rigidity, or guarding and bowel sounds are present. EXTREMITIES:  1 to 2+ edema in right lower extremity, chronic in nature. Left lower extremity with trace edema. NEUROLOGIC:  Cranial nerves II through XII grossly intact. No focal  neurologic deficits noted. MUSCULOSKELETAL:  No excessive lordosis, kyphosis, or scoliosis. No  gross bony or soft tissue asymmetry. ASSESSMENT:  1. Acute kidney injury. 2.  Electrolyte imbalance with hyperkalemia and hyponatremia. 3.  Hyperbilirubinemia. 4.  Hepatocellular carcinoma. 5.  Ascites. PLAN:  The patient is admitted to [place] [address] q.4 hours. Activity bedrest with bathroom privileges with assist.  Is and Os daily. Diet general.  IV fluids were initially started at 76. Normal saline  increased to 125 _____ compensate for the patient's clinical  dehydration. Renal has been consulted. Their recommendation is still  pending. We are checking BMPs every six hours for the time being. Continue routine home medications with Zofran ordered for nausea. Continue lactulose in that the patient's ammonia level has been elevated  and is stable at the present time. I have asked Interventional  Radiology to see the patient to determine _____ replacement if  necessary. Hospice care once again suggested to the patient and his  decision is still pending. For any further orders, please see chart.         Rhoderick Mohs, DO    D: 04/28/2021 9:49:12       T: 04/28/2021 9:55:38     [email or web address]  Job#: [id number]     Doc#: [id number]    CC:

## 2021-04-28 NOTE — PROGRESS NOTES
Database complete. Medications reconciled. Care plans and education initiated. [facility]. Giacomo Shore has a Mediport to right chest and plurex catheter to abdomen. Son states Plurex catheter average output is 750cc daily, but only had out 450cc today. Pt is on oral chemotherapy but has not had any chemotherapy through his port since 12/2020. Pt had all his upper teeth extracted anfd will require a dental soft diet. Family reports 60 lb weighht loss with very poor appetite at home. Family states Giacomo Shore walked with no assistive devices as of yesterday but is having difficulty with ambulation currently. No recent falls. [facility] has been coming into the home. Pt states his oncologist is Dr. Angel Gold and liver specialist is Dr. Miguel Villarreal. Chart records show nephrologist is Dr. Leila Smith and is known to [facility] for Palliative care.

## 2021-04-28 NOTE — CONSULTS
Nephrology Consult Note    Patient's Name: Dejon Boyer  12:39 PM  4/28/2021    Nephrologist: Reynaldo Barber MD    Reason for Consult:  Renal is consulted for LETA  Requesting Physician:  Mal Lau DO    Chief Complaint:  Nausea, vomiting, weakness    History Obtained From:  Patient and EMR  History of Present Ilness:    Dejon Boyer is a 77 y.o. male with   past medical history of metastatic hepatocellular carcinoma with peritoneal carcinomatosis, recurrent ascites needing paracentesis, recently had an abdominal catheter placed for drainage at home, presented with chief complaints of N/V and weakness.  I recently have seen him for hyponatremia and he was discharged on 4/20/21 with a BUN of 17, cr of 0.5, Na of 127, I advised him to take salt tabs 1 gm PO BID for 1 week after discharge along with a strict 1 L fluid restriction and a general diet with salt and protein intake    Labs from the ER showed a BUN of 41, cr of 1.3, K of 5.6 along with hypotension  He is on IVF now and states feeling better      Past Medical History:   Diagnosis Date    Cirrhosis ([address]     Esophageal varices ([address]     GI bleed 10/2020    Hepatitis C     Hepatocellular carcinoma ([address]     Hypertension        Past Surgical History:   Procedure Laterality Date    CT NEEDLE BIOPSY LIVER PERCUTANEOUS  10/15/2020    CT NEEDLE BIOPSY LIVER PERCUTANEOUS 10/15/2020 [facility] CT    ENDOSCOPY, COLON, DIAGNOSTIC  04/30/2013    IR EMBOLIZATION TUMOR / ORGAN  12/14/2020    IR EMBOLIZATION TUMOR / ORGAN 12/14/2020 Kal Ceballos MD [facility]    IR EMBOLIZATION TUMOR / ORGAN  12/16/2020    IR EMBOLIZATION TUMOR / ORGAN 12/16/2020 Kal Ceballos MD [facility]    IR PORT PLACEMENT EQUAL OR GREATER THAN 5 YEARS  12/14/2020    IR PORT PLACEMENT EQUAL OR GREATER THAN 5 YEARS 12/14/2020 Kal Ceballos MD [facility]    IR TUNNELED INTRAPERITNL CATH W/IMAG  4/9/2021    IR TUNNELED INTRAPERITNL CATH W/IMAG 4/9/2021 Oracio Ma MD [facility]    LEG SURGERY      right leg    PARACENTESIS Right 02/08/2021    Dr. Chela Marie II, 4950cc hazy maroon fluid    PARACENTESIS Right 03/24/2021    6100 cc dark serosang drainage    UPPER GASTROINTESTINAL ENDOSCOPY  09/02/2016    Nevarez, nonbleeding esophageal varicies and gastritis    UPPER GASTROINTESTINAL ENDOSCOPY  03/27/2018    UPPER GASTROINTESTINAL ENDOSCOPY N/A 03/27/2018    EGD BAND LIGATION performed by Wong Gonzalez MD at [address] [place] [address] N/A 10/14/2020    EGD CONTROL HEMORRHAGE performed by Brina Gruber MD at [address] [place] [address] N/A 01/18/2021    EGD ESOPHAGOGASTRODUODENOSCOPY POSSIBLE BANDING performed by Brina Gruber MD at [facility] ENDOSCOPY       Family History   Family history unknown: Yes        reports that he has never smoked. He has never used smokeless tobacco. He reports previous alcohol use. He reports that he does not use drugs. Allergies:  Patient has no known allergies.     Current Medications:    ondansetron (ZOFRAN) injection 4 mg, Q6H PRN  acetaminophen (TYLENOL) tablet 500 mg, Q6H PRN  lactulose (CHRONULAC) 10 GM/15ML solution 20 g, BID  Lenvatinib (12 MG Daily Dose) CPPK 12 mg, Daily  mirtazapine (REMERON) tablet 15 mg, Nightly  pantoprazole (PROTONIX) tablet 40 mg, QAM AC  sennosides-docusate sodium (SENOKOT-S) 8.6-50 MG tablet 2 tablet, Daily PRN  sodium chloride tablet 1 g, BID WC  sodium chloride flush 0.9 % injection 5-40 mL, BID  sodium chloride flush 0.9 % injection 5-40 mL, PRN  0.9 % sodium chloride infusion, Continuous  traMADol (ULTRAM) tablet 50 mg, Q6H PRN        Review of Systems:   10 point ROS done at this time is negative except for the things mentioned in the HPI    Physical exam:   Constitutional:    Vitals: BP (!) 78/58 Comment: manual  Pulse 100   Temp 97.7 °F (36.5 °C) (Oral)   Resp 14   Ht 6' (1.829 m)   Wt 110 lb (49.9 CLARITYU, SPECGRAV, LEUKOCYTESUR, UROBILINOGEN, BILIRUBINUR, BLOODU, GLUCOSEU, KETUA, AMORPHOUS     Imaging:  Ct Abdomen Pelvis W Iv Contrast Additional Contrast? None    Result Date: 4/15/2021  EXAMINATION: CT OF THE ABDOMEN AND PELVIS WITH CONTRAST 4/15/2021 6:07 pm TECHNIQUE: CT of the abdomen and pelvis was performed with the administration of intravenous contrast. Multiplanar reformatted images are provided for review. Dose modulation, iterative reconstruction, and/or weight based adjustment of the mA/kV was utilized to reduce the radiation dose to as low as reasonably achievable. COMPARISON: 02/01/2021. HISTORY: ORDERING SYSTEM PROVIDED HISTORY: IR paracentesis drain placed in the left abdomen, history of cirrhosis TECHNOLOGIST PROVIDED HISTORY: Additional Contrast?->None Reason for exam:->IR paracentesis drain placed in the left abdomen, history of cirrhosis Decision Support Exception->Emergency Medical Condition (MA) FINDINGS: The lung bases demonstrate extensive/ innumerable pulmonary nodules and masses with the largest 1 in the left lower lobe measuring 4.2 x 3 cm and the largest 1 in the right lower lobe measuring 3.2 x 2.6 cm with significant progression in the disease. There is atelectasis and pleural effusion in the left lower lobe. Liver is cirrhotic with nodular border. A previously noted necrotic mass in the right hepatic lobe currently measures 8.3 x 3.3 cm which is somewhat increased in size. There may be tumor invasion with occlusion of the right portal vein which is thrombosed. There is large amount of ascites. Gallbladder is partially distended with gallstones and wall thickening. There is splenomegaly with spleen measuring 19 cm. There is prominence of the portal vein concerning for portal venous hypertension with venous varices at the GE junction and adair of the liver as well as splenic hilum.  Pancreas, the adrenals and the kidneys are normal.  There is extensive metastatic nodules in PERC NUMBER OF IMAGES:  4 INDICATION: C22.0 Hepatocellular carcinoma ([facility] placement of plexus catheter for recurrent ascites What reading provider will be dictating this exam?->[facility] COMPARISON: None Insertion of a permanent peritoneal drainage catheter with tunnel creation: After obtaining informed consent and following the routine sterile prep and drape a combination of ultrasound and fluoroscopy were utilized to place a needle into the peritoneal cavity and ascitic fluid was obtained. Subsequently after the administration of local anesthesia a tunnel tract was created and dilated. Two separate incisions were made. Routine ultrasound and Doppler ultrasound were used. Using direct real-time ultrasound imaging peritoneal access was obtained. An image was stored and copy was transmitted to PACS. Subsequently with real-time guidance a needle was inserted intravascular and through the needle a wire. The needle tip was visualized with real time guidance as it was placed into the peritoneal space. A tunneled catheter was subsequently advanced from the first incision into the second incision and subsequently a catheter was advanced into the peritoneal space. Confirmation of position was confirmed under fluoroscopic control. Time out occurred at 1148 hours. Patient received 1 mg of Versed, 50 mcg of fentanyl and local anesthesia and was monitored for 30 minutes or less by a registered nurse. Patient received 12 seconds fluoroscopy. Subsequently ascitic fluid was drained. The patient tolerated the procedure well. The skin was sutured with 3-0 nylon. The drain was sutured in place. The cuff was placed appropriately. Successful uncomplicated placement of a Pleurx catheter for a permanent peritoneal catheter drainage. Assessment  1. Acute Kidney Injury secondary to severe pre renal azotemia from volume depletion and hypotension  2. Hyperkalemia secondary to LETA and acidosis  3.  Chronic hyponatremia secondary to hypovolemia, poor solute intake and excess free water intake    Plan  1. Renal function continues to improve with aggressive IV hydration  2. Decrease IVF to 100 ml/hr  3.  Obtain urine electrolytes, creatinine, urea and osm      Thank you Dr. Margie Laureano DO for allowing us to participate in care of Lyle Gómez           Electronically signed by Jeanne Winter MD on 4/28/2021 at 12:39 PM

## 2021-04-28 NOTE — PROGRESS NOTES
IR request presented to Dr. Merry Stone, also updated him on pt current b/p of 78/50. Dr. Merry Stone recommends no treatment for leaking pleurx at this time. Floor nurseMEAGAN, updated and aware.

## 2021-04-28 NOTE — PLAN OF CARE
Problem: Falls - Risk of:  Goal: Will remain free from falls  Description: Will remain free from falls  4/28/2021 0022 by Eliseo Shanks RN  Outcome: Met This Shift  4/27/2021 2009 by Guerrero Ratliff RN  Outcome: Met This Shift  Goal: Absence of physical injury  Description: Absence of physical injury  4/28/2021 0022 by Eliseo Shanks RN  Outcome: Met This Shift  4/27/2021 2009 by Guerrero Ratliff RN  Outcome: Met This Shift     Problem: Skin Integrity:  Goal: Will show no infection signs and symptoms  Description: Will show no infection signs and symptoms  4/28/2021 0022 by Eliseo Shanks RN  Outcome: Met This Shift  4/27/2021 2009 by Guerrero Ratliff RN  Outcome: Met This Shift  Goal: Absence of new skin breakdown  Description: Absence of new skin breakdown  4/28/2021 0022 by Eliseo Shanks RN  Outcome: Met This Shift  4/27/2021 2009 by Guerrero Ratliff RN  Outcome: Met This Shift     Problem: Pain:  Description: Pain management should include both nonpharmacologic and pharmacologic interventions.   Goal: Pain level will decrease  Description: Pain level will decrease  4/28/2021 0022 by Eliseo Shanks RN  Outcome: Met This Shift  4/27/2021 2009 by Guerrero Ratliff RN  Outcome: Met This Shift  Goal: Control of acute pain  Description: Control of acute pain  4/28/2021 0022 by Eliseo Shanks RN  Outcome: Met This Shift  4/27/2021 2009 by Guerrero Ratliff RN  Outcome: Met This Shift  Goal: Control of chronic pain  Description: Control of chronic pain  4/28/2021 0022 by Eliseo Shanks RN  Outcome: Met This Shift  4/27/2021 2009 by Guerrero Ratliff RN  Outcome: Met This Shift     Problem: Confusion - Acute:  Goal: Absence of continued neurological deterioration signs and symptoms  Description: Absence of continued neurological deterioration signs and symptoms  Outcome: Not Met This Shift  Goal: Mental status will be restored to baseline  Description: Mental status will be restored to baseline  Outcome: Not Met This Shift Problem: Discharge Planning:  Goal: Ability to perform activities of daily living will improve  Description: Ability to perform activities of daily living will improve  Outcome: Not Met This Shift  Goal: Participates in care planning  Description: Participates in care planning  Outcome: Not Met This Shift     Problem: Injury - Risk of, Physical Injury:  Goal: Will remain free from falls  Description: Will remain free from falls  4/28/2021 0022 by Nan Power RN  Outcome: Met This Shift  4/27/2021 2009 by Kelly Pacheco RN  Outcome: Met This Shift  Goal: Absence of physical injury  Description: Absence of physical injury  4/28/2021 0022 by Nan Power RN  Outcome: Met This Shift  4/27/2021 2009 by Kelly Pacheco RN  Outcome: Met This Shift     Problem: Mood - Altered:  Goal: Mood stable  Description: Mood stable  Outcome: Met This Shift  Goal: Absence of abusive behavior  Description: Absence of abusive behavior  Outcome: Met This Shift  Goal: Verbalizations of feeling emotionally comfortable while being cared for will increase  Description: Verbalizations of feeling emotionally comfortable while being cared for will increase  Outcome: Not Met This Shift     Problem: Psychomotor Activity - Altered:  Goal: Absence of psychomotor disturbance signs and symptoms  Description: Absence of psychomotor disturbance signs and symptoms  Outcome: Met This Shift     Problem: Sensory Perception - Impaired:  Goal: Demonstrations of improved sensory functioning will increase  Description: Demonstrations of improved sensory functioning will increase  Outcome: Not Met This Shift  Goal: Decrease in sensory misperception frequency  Description: Decrease in sensory misperception frequency  Outcome: Met This Shift  Goal: Able to refrain from responding to false sensory perceptions  Description: Able to refrain from responding to false sensory perceptions  Outcome: Met This Shift  Goal: Demonstrates accurate environmental perceptions  Description: Demonstrates accurate environmental perceptions  Outcome: Not Met This Shift  Goal: Able to distinguish between reality-based and nonreality-based thinking  Description: Able to distinguish between reality-based and nonreality-based thinking  Outcome: Met This Shift  Goal: Able to interrupt nonreality-based thinking  Description: Able to interrupt nonreality-based thinking  Outcome: Met This Shift     Problem: Sleep Pattern Disturbance:  Goal: Appears well-rested  Description: Appears well-rested  Outcome: Met This Shift     Problem: FLUID AND ELECTROLYTE IMBALANCE  Goal: Fluid and electrolyte balance are achieved/maintained  4/28/2021 0022 by Michael Denise RN  Outcome: Not Met This Shift  4/27/2021 2009 by Danni Mccall RN  Outcome: Ongoing     Problem: HEMODYNAMIC STATUS  Goal: Hemoglobin within specified parameters  4/28/2021 0022 by Michael Denise RN  Outcome: Met This Shift  4/27/2021 2009 by Danni Mccall RN  Outcome: Ongoing     Problem: Nutrition Deficit:  Goal: Nutritional status is improving  4/28/2021 0022 by Michael Denise RN  Outcome: Not Met This Shift  4/27/2021 2009 by Danni Mccall RN  Outcome: Ongoing     Problem: Mobility - Impaired  Goal: Mobility level is maintained or improved  4/28/2021 0022 by Michael Denise RN  Outcome: Not Met This Shift  4/27/2021 2009 by Danni Mccall RN  Outcome: Ongoing   New admission

## 2021-04-28 NOTE — PROGRESS NOTES
techniques and AE recommendations to increase functional independence within precautions  [x]Energy conservation techniques to improve tolerance for ADLs/IADLs  [x]Functional transfer/mobility training/DME recommendations for increased independence, safety and fall prevention  [x]Patient/family education to increase safety and functional independence  [x]Environmental modifications for safe mobility and completion of ADLs/IADLs  []Cognitive retraining exercises to improve problem solving skills & safe participation in ADLs/IADLs   []Sensory re-education techniques to improve extremity awareness, maintain skin integrity and improve hand function   []Visual/Perceptual retraining  to improve body awareness and safety during transfers and ADLs   []Splinting/positioning needs to maintain joint/skin integrity and prevent contractures   [x]Therapeutic activity to improve functional performance during ADLs/IADLs  [x]Therapeutic exercise to improve tolerance and functional strength for ADLs/IADLs  [x]Balance retraining/tolerance tasks for facilitation of postural control with dynamic challenges during ADLs   [x]Neuromuscular re-education: facilitate righting/equilibrium reactions, midline orientation, scapular stability/mobility, normalize muscle tone, and facilitate active functional movement/attention  []Delirium prevention/treatment   []Positioning to improve functional independence and prevent skin breakdown   []Other:     Rehab Potential: Good for established goals. Patient / Family Goal: No goal stated. Patient and/or family were instructed on functional diagnosis, prognosis/goals, and OT plan of care. Demonstrated Fair understanding.     Eval Complexity: Low    Time In: 1325  Time Out: 1340  Total Treatment Time: 0 minutes      Minutes Units   OT Eval Low 98294 15 1   OT Eval Medium 54918     OT Eval High [id number]     OT Re-Eval [id number]     Therapeutic Ex 30946     Therapeutic Activities 75702     ADL/Self Care 84162 Orthotic Management 48758     Neuro Re-Ed 05355     Non-Billable Time N/A ---     Evaluation time includes thorough review of current medical information, gathering information on past medical history/social history and prior level of function, completion of standardized testing/informal observation of tasks, assessment of data, and education on plan of care and goals. Ruth Leblanc, OTR/L  License Number: [id number]

## 2021-04-28 NOTE — CONSULTS
Palliative Care Department  [phone]  Palliative Care Initial Consult  Provider Naheed Quispe PA-C    Lyle Gómez  [id number]  Hospital Day: 2    Referring Provider: Zev Ellison DO  Palliative Medicine was consulted for assistance with: goals of care    CHIEF COMPLAINT: Dehydration  Brief summary: 43-year-old male with history of alcoholic cirrhosis, hepatitis C and metastatic hepatocellular carcinoma with carcinomatosis and lung metastasis. ASSESSMENT/PLAN:     Pertinent hospital diagnoses:  1. Hepatocellular carcinoma, metastatic disease involving the abdominal cavity, carcinomatosis and pulmonary metastasis  -Oncology consulted and following patient states he wishes to continue treatments currently  2. Cirrhosis  -Recurrent ascites, has been negative for malignancy with a peritoneal/Pleurx drain placed  3. Acute kidney injury, hyponatremia  -Nephrology consulted  4. Pain, associated with neoplasm   -Patient's pain controlled with Ultram as prescribed continue  5. Nausea and vomiting   -? Chemotherapy-induced, improved currently patient with underlying carcinomatosis so at high risk for dysmotility or partial obstruction especially with large amount of ascitic fluid present    PALLIATIVE CARE ENCOUNTER  - Outcome of goals of care meeting:   -Continue current care  - Capacity: At this time, Lyle Gómez, Does have capacity for medical decision-making. Capacity is time limited and situation/question specific  - Surrogate decision maker/Legal NOK:    Lacey Fidel [phone] son  - Code Status:   DNR-CCA   - Advanced directives: None  - Spiritual assessment: No needs identified  - Bereavement and grief:  To be assessed    - DISPO: Continue current care    Referrals to: none today    HPI:   Lyle Gómez is a 77 y.o. with a past medical history of hypertension, hepatocellular carcinoma, hepatitis C, esophageal varices, cirrhosis, recurrent ascites with peritoneal drain placed, GI bleed who presented to the emergency department from home with dehydration. Patient completed workup in the ED and was admitted on 4/27/2021 with diagnosis(ses) of acute kidney injury, electrolyte imbalance with hyperkalemia and hyponatremia, hyperbilirubinemia, hepatocellular cancer and ascites. .    Patient was seen in outpatient palliative medicine clinic at the end of March of this year. Additionally reports indicate he was discharged with home health however had potentially been enrolled or trialing hospice. He did remain on oral chemotherapy. Details of Conversation: Patient is awake and alert in bed. No family is currently at bedside. I attempted to review his recent history and determine if he was enrolled in the hospice or what service was in his home in addition to home health but he was unable to answer me. He did remember being seen in outpatient palliative medicine clinic in March of this year. Patient was recently hospitalized with hyponatremia. We reviewed goals of care,diagnosis, symptoms during our discussion. Specifically we discussed his treatments would be to primarily control his cancer versus cure his cancer. We discussed with advanced stage IV cancer, appears are very unlikely. His goal currently is to live longer. He does wish to continue with treatment for his cancer. Additionally he needs to get better control of his symptoms and his overall performance status likely will need to improve in order to continue with systemic treatment. He has pain that is alleviated with Ultram.  He has some intermittent nausea however he associated it with his recent chemotherapy treatment. He denies shortness of breath, headache.     Past Medical History:   Diagnosis Date    [facility] ([facility] [address] [address]     Esophageal varices ([facility])     GI bleed 10/2020    Hepatitis C     Hepatocellular carcinoma ([facility] [facility]     Hypertension    Oncology history  Hepatocellular cancer treated with Y 90 in December 2020 followed by Tecentriq and bevacizumab. Initially patient responded but developed disease progression in March 2021 with peritoneal spread and recurrent ascites as well as disease progression in liver.     Past Surgical History:   Procedure Laterality Date    CT NEEDLE BIOPSY LIVER PERCUTANEOUS  10/15/2020    CT NEEDLE BIOPSY LIVER PERCUTANEOUS 10/15/2020 [facility] CT    ENDOSCOPY, COLON, DIAGNOSTIC  04/30/2013    IR EMBOLIZATION TUMOR / ORGAN  12/14/2020    IR EMBOLIZATION TUMOR / ORGAN 12/14/2020 James Alicea MD [facility]    IR EMBOLIZATION TUMOR / ORGAN  12/16/2020    IR EMBOLIZATION TUMOR / ORGAN 12/16/2020 James Alicea MD [facility]    IR PORT PLACEMENT EQUAL OR GREATER THAN 5 YEARS  12/14/2020    IR PORT PLACEMENT EQUAL OR GREATER THAN 5 YEARS 12/14/2020 James Alicea MD [facility]    IR TUNNELED INTRAPERITNL CATH W/IMAG  4/9/2021    IR TUNNELED INTRAPERITNL CATH W/IMAG 4/9/2021 James Alicea MD [facility]    LEG SURGERY      right leg    PARACENTESIS Right 02/08/2021    Dr. Rio Aguilar II, 4950cc hazy maroon fluid    PARACENTESIS Right 03/24/2021    6100 cc dark serosang drainage    UPPER GASTROINTESTINAL ENDOSCOPY  09/02/2016    Nevarez, nonbleeding esophageal varicies and gastritis    UPPER GASTROINTESTINAL ENDOSCOPY  03/27/2018    UPPER GASTROINTESTINAL ENDOSCOPY N/A 03/27/2018    EGD BAND LIGATION performed by Dane Mccauley MD at [address] N/A 10/14/2020    EGD CONTROL [facility]OR[facility]AGE [facility] [address] [facility] N/A 01/18/2021    EGD ESOPHAGOGASTRODUODENOSCOPY POSSIBLE BANDING performed by Malachi Yuen MD at [facility] ENDOSCOPY       Inpatient medications reviewed: yes  Home Medications reviewed: yes    No Known Allergies  Family History   Family history unknown: Yes       Social history:   status: no  Marital status: Single  Living status:

## 2021-04-28 NOTE — PROGRESS NOTES
Physical Therapy    Facility/Department: [address] MED SURG/TELE  Initial Assessment    NAME: Calin Wu  : 1955  MRN: [id number]    Date of Service: 2021      Patient Diagnosis(es): The primary encounter diagnosis was LETA (acute kidney injury) ([facility] [address] A diagnosis of Hyperkalemia was also pertinent to this visit. has a past medical history of Cirrhosis ([facility] [address] Esophageal varices ([facility] [address] GI bleed, Hepatitis C, Hepatocellular carcinoma ([facility] [address] and Hypertension. has a past surgical history that includes Leg Surgery; Endoscopy, colon, diagnostic (2013); Upper gastrointestinal endoscopy (2016); Upper gastrointestinal endoscopy (2018); Upper gastrointestinal endoscopy (N/A, 2018); Upper gastrointestinal endoscopy (N/A, 10/14/2020); CT NEEDLE BIOPSY LIVER PERCUTANEOUS (10/15/2020); IR EMBOLIZATION TUMOR/ORGAN ISCH/INFARC (2020); IR PORT PLACEMENT > 5 YEARS (2020); IR EMBOLIZATION TUMOR/ORGAN ISCH/INFARC (2020); Upper gastrointestinal endoscopy (N/A, 2021); Paracentesis (Right, 2021); Paracentesis (Right, 2021); and IR GUIDED TUNNELED INTRAPERITONEAL CATH W OR WO CONTRAST PERC (2021). Referring Provider:  Edyta Clark DO      Evaluating Therapist: [facility] PSYCHIATRY PT      Room #:533  DIAGNOSIS: Acute Kidney Injury  Additional Pertinent History:Cirrhosis, Hepatocellular carcinoma, pleurex catheter  PRECAUTIONS: falls, alarm    Social:  Pt lives alone in a 2 floor plan . Independent without device. Initial Evaluation  Date: 21 Treatment      Short Term/ Long Term   Goals   Was pt agreeable to Eval/treatment? yes     Does pt have pain?  L LE     Bed Mobility  Rolling: min assist  Supine to sit: min assist  Sit to supine: min assist  Scooting: min assist  SBA   Transfers Sit to stand: mod assist  Stand to sit: mod assist  Stand pivot: NT  CGA   Ambulation    5 feet with ww with min assist  150 feet with ww with CGA   Stair Negotiation  Ascended and descended  NT   4-12 steps with 1 rail with CGA   LE strength     3+/5    4-/5   balance      Fair with ww     AM-PAC Raw score               15/24         Pt is alert and grossly oriented. Increased processing time. Some difficulty answering question  LE ROM: WFL  Sensation: intact  Endurance: fair-     ASSESSMENT  Pt displays functional ability as noted in the objective portion of this evaluation. Patient education  Pt educated on hand placement with transfers    Patient response to education:   Pt verbalized understanding Pt demonstrated skill Pt requires further education in this area   no With cues and assist yes     ASSESSMENT:    Comments:  Pt c/o L LE pain with mobility. Increased time to complete mobility. Gait distance limited due to concern for Low BP. Pt did c/o slight dizziness in standing. Pt's/ family goals   1. To get stronger    Patient and or family understand(s) diagnosis, prognosis, and plan of care. PLAN OF CARE:    Current Treatment Recommendations     [x] Strengthening     [] ROM   [x] Balance Training   [x] Endurance Training   [x] Transfer Training   [x] Gait Training   [x] Stair Training   [] Positioning   [] Safety and Education Training   [] Patient/Caregiver Education   [] HEP  [] Other     Frequency of treatments: 2-5x/week x 5.days    Time in  130  Time out  145        Evaluation Time includes thorough review of current medical information, gathering information on past medical history/social history and prior level of function, completion of standardized testing/informal observation of tasks, assessment of data and education on plan of care and goals.     CPT codes:  [x] Low Complexity PT evaluation 35981  [] Moderate Complexity PT evaluation 59539  [] High Complexity PT evaluation 62287  [] PT Re-evaluation 83916  [] Gait training 58509 minutes  [] Manual therapy 75221 minutes  [] Therapeutic activities 58189 minutes  [] Therapeutic exercises 04168 minutes  [] Neuromuscular reeducation 81282 minutes     [facility] PSYCHIATRY PT [id number]

## 2021-04-28 NOTE — PROGRESS NOTES
Notified Dr Mk Li of need for admission orders. Pt seems somewhat confused. Is able to verify name and , but not a lot of other information. Pt states he can hear this nurse, but is not responding to most questions. Pt aware that he has CA that spread and used to be on IV chemo that was switched to PO. Does not answer how he gets around, what brought him in to the ED. Cannot remember when the Pleur-X was put in; dressing had moderate amount of serosanguineous drainage. Dressing changed. Left foot seems to hurt with palpation although right foot is more swollen.

## 2021-04-28 NOTE — PATIENT CARE CONFERENCE
P Quality Flow/Interdisciplinary Rounds Progress Note        Quality Flow Rounds held on April 28, 2021    Disciplines Attending:  Bedside Nurse, ,  and Nursing Unit Leadership    Ubaldo Rincon was admitted on 4/27/2021  4:09 PM    Anticipated Discharge Date:  Expected Discharge Date: 04/30/21    Disposition:    Misha Score:  Misha Scale Score: 14    Readmission Score:         Discussed patient goal for the day, patient clinical progression, and barriers to discharge.   The following Goal(s) of the Day/Commitment(s) have been identified:  Pleurex replacement       Suyapa Morris  April 28, 2021

## 2021-04-28 NOTE — PROGRESS NOTES
Comprehensive Nutrition Assessment    Type and Reason for Visit:  Initial, Positive Nutrition Screen    Nutrition Recommendations/Plan: Continue Current Diet, Start Oral Nutrition Supplement  Start Ensure Enlive BID & Magic Cup BID    Nutrition Assessment:  +NS for poor appetite PTA & unintended wt loss (30%x5 mon). Admit d/t LETA. Currently on oral chemo for hepatocellular carcinoma. H/o cirrhosis. Pt at nutritional risk d/t severe malnutrition. Will start ONS. Malnutrition Assessment:  Malnutrition Status:  Severe malnutrition    Context:  Chronic Illness     Findings of the 6 clinical characteristics of malnutrition:  Energy Intake:  7 - 75% or less estimated energy requirements for 1 month or longer(Per pt family)  Weight Loss:  7 - Greater than 7.5% over 3 months(30% x5mon)     Body Fat Loss:  7 - Severe body fat loss Orbital   Muscle Mass Loss:  7 - Severe muscle mass loss Temples (temporalis), Calf (gastrocnemius), Clavicles (pectoralis & deltoids)  Fluid Accumulation:  Unable to assess     Strength:  Not Performed    Estimated Daily Nutrient Needs:  Energy (kcal):  3795-5759(MSJ x 1.3 SF); Weight Used for Energy Requirements:  Current     Protein (g):  120-130(2.0-2.2g/kg CBW); Weight Used for Protein Requirements:  Current        Fluid (ml/day):  1239-0232; Method Used for Fluid Requirements:  1 ml/kcal      Nutrition Related Findings:  Disoriented, h/o ETOH abuse, missing teeth, I&O WNL, jaundice, distended abd, +BS, +1-2 pitting LE edema, mediport, hyponatremia, hyperkalemia, hypocalcemia.       Wounds:  None       Current Nutrition Therapies:    DIET GENERAL; Dental Soft    Anthropometric Measures:  · Height: 6' (182.9 cm)  · Current Body Weight: 130 lb (59 kg)(4/28, bed scale)   · Admission Body Weight: 110 lb (49.9 kg)(4/27, no method)    · Usual Body Weight: 187 lb (84.8 kg)(12/2020, actual)     · Ideal Body Weight: 178 lbs; % Ideal Body Weight 73 %   · BMI: 17.6  · BMI Categories:

## 2021-04-29 NOTE — PLAN OF CARE
Problem: Skin Integrity:  Goal: Will show no infection signs and symptoms  Description: Will show no infection signs and symptoms  Outcome: Met This Shift     Problem: Malnutrition  (NI-5.2)  Goal: Food and/or Nutrient Delivery  Description: Individualized approach for food/nutrient provision.   4/28/2021 1530 by Jasen Colvin RD, LD  Outcome: Met This Shift  4/28/2021 1530 by Jasen Colvin RD, LD  Outcome: Met This Shift

## 2021-04-29 NOTE — PATIENT CARE CONFERENCE
P Quality Flow/Interdisciplinary Rounds Progress Note        Quality Flow Rounds held on April 29, 2021    Disciplines Attending:  Bedside Nurse, ,  and Nursing Unit Leadership    Rose Jacobo was admitted on 4/27/2021  4:09 PM    Anticipated Discharge Date:  Expected Discharge Date: 04/30/21    Disposition:    Misha Score:  Misha Scale Score: 14    Readmission Risk              Risk of Unplanned Readmission:        26           Discussed patient goal for the day, patient clinical progression, and barriers to discharge.   The following Goal(s) of the Day/Commitment(s) have been identified:  Pleurex replacement       Geehta Duong  April 29, 2021

## 2021-04-29 NOTE — PROGRESS NOTES
Dr Heriberto Gutierrez notified of order to replace Pleurx. He feels Pleurx is working properly and should be drained would not recommend replacing at this time for leaking at site,floor nurse calling Dr Merlin Few and will notify please call Radiology to talk to Dr Akosua Hilton with any questions.

## 2021-04-29 NOTE — PROGRESS NOTES
this note is for you!!!!!! Dr Viktoria Cuellar is asking if this patient can go to a transitional care facility for 7-10 days then go home with Ron Amaya? His care givers (children) will not be here to care for him until after then and he cannot go home alone. . Thanks

## 2021-04-29 NOTE — PROGRESS NOTES
Paged Dr Tari Aguirre about there not being any order to drain the plurex, waiting for a return call

## 2021-04-29 NOTE — PROGRESS NOTES
Palliative Care Department  [phone]  Palliative Care Initial Consult  Provider Danielle Recinos MD    Lyle Gómez  [id number]  Hospital Day: 3    Referring Provider: Teddy Miranda DO  Palliative Medicine was consulted for assistance with: goals of care    CHIEF COMPLAINT: Dehydration  Brief summary: 70-year-old male with history of alcoholic cirrhosis, hepatitis C and metastatic hepatocellular carcinoma with carcinomatosis and lung metastasis. ASSESSMENT/PLAN:     Pertinent hospital diagnoses:  1. Hepatocellular carcinoma, metastatic disease involving the abdominal cavity, carcinomatosis and pulmonary metastasis  -Oncology consulted and following patient states he wishes to continue treatments currently  2. Cirrhosis  -Recurrent ascites, has been negative for malignancy with a peritoneal/Pleurx drain placed  3. Acute kidney injury, hyponatremia  -Nephrology consulted  4. Pain, associated with neoplasm   -Patient's pain controlled with Ultram as prescribed continue  5. Nausea and vomiting   -? Chemotherapy-induced, improved currently patient with underlying carcinomatosis so at high risk for dysmotility or partial obstruction especially with large amount of ascitic fluid present    PALLIATIVE CARE ENCOUNTER  - Outcome of goals of care meeting:   -Continue current care  - Capacity: At this time, Lyle Gómez, Does have capacity for medical decision-making. Capacity is time limited and situation/question specific  - Surrogate decision maker/Legal NOK:    Brenda Castellano [phone] son  - Code Status:   DNR-CCA   - Advanced directives: None  - Spiritual assessment: No needs identified  - Bereavement and grief:  To be assessed    - DISPO: Continue current care    Referrals to: none today    HPI:   Lyle Gómez is a 77 y.o. with a past medical history of hypertension, hepatocellular carcinoma, hepatitis C, esophageal varices, cirrhosis, recurrent ascites with peritoneal drain placed, GI bleed who presented to the emergency department from home with dehydration. Patient completed workup in the ED and was admitted on 4/27/2021 with diagnosis(ses) of acute kidney injury, electrolyte imbalance with hyperkalemia and hyponatremia, hyperbilirubinemia, hepatocellular cancer and ascites. .    Patient was seen in outpatient palliative medicine clinic at the end of March of this year. Additionally reports indicate he was discharged with home health however had potentially been enrolled or trialing hospice. He did remain on oral chemotherapy. Details of Conversation: Patient is awake and alert in bed. No family is currently at bedside. Patient currently tolerates tramadol has been using that lately, he has episodes of nausea. Currently utilizing antinausea medications. Does not complain of having any constipation. Goals of care was established yesterday. Past Medical History:   Diagnosis Date    Cirrhosis ([facility] [address]     Esophageal varices (HCC)     GI bleed 10/2020    Hepatitis C     Hepatocellular carcinoma ([facility] [address]     Hypertension    Oncology history  Hepatocellular cancer treated with Y 90 in December 2020 followed by Tecentriq and bevacizumab. Initially patient responded but developed disease progression in March 2021 with peritoneal spread and recurrent ascites as well as disease progression in liver.     Past Surgical History:   Procedure Laterality Date    CT NEEDLE BIOPSY LIVER PERCUTANEOUS  10/15/2020    CT NEEDLE BIOPSY LIVER PERCUTANEOUS 10/15/2020 [facility] CT    ENDOSCOPY, COLON, DIAGNOSTIC  04/30/2013    IR EMBOLIZATION TUMOR / ORGAN  12/14/2020    IR EMBOLIZATION TUMOR / ORGAN 12/14/2020 MD ERICK Lino [facility] PROCEDURES    IR EMBOLIZATION TUMOR / ORGAN  12/16/2020    IR EMBOLIZATION TUMOR / ORGAN 12/16/2020 MD ERICK Lino SPECIAL PROCEDURES    IR PORT PLACEMENT EQUAL OR GREATER THAN 5 YEARS  12/14/2020    IR PORT PLACEMENT EQUAL OR GREATER THAN 5 YEARS 12/14/2020 Neno Garcia Martha Stein MD [facility]    IR TUNNELED INTRAPERITNL CATH W/IMAG  4/9/2021    IR TUNNELED INTRAPERITNL CATH W/IMAG 4/9/2021 Tonie Jean Baptiste MD [facility]    LEG SURGERY      right leg    PARACENTESIS Right 02/08/2021    Dr. Ayde Sousa II, 4950cc hazy maroon fluid    PARACENTESIS Right 03/24/2021    6100 cc dark serosang drainage    UPPER GASTROINTESTINAL ENDOSCOPY  09/02/2016    Payton Lui, nonbleeding esophageal varicies and gastritis    UPPER GASTROINTESTINAL ENDOSCOPY  03/27/2018    UPPER GASTROINTESTINAL ENDOSCOPY N/A 03/27/2018    EGD BAND LIGATION performed by Darell Garg MD at [address] [place] [address] N/A 10/14/2020    EGD CONTROL HEMORRHAGE performed by Loyd Todd MD at [address] [place] [address] N/A 01/18/2021    EGD ESOPHAGOGASTRODUODENOSCOPY POSSIBLE BANDING performed by Loyd Todd MD at [facility] ENDOSCOPY       Inpatient medications reviewed: yes  Home Medications reviewed: yes    No Known Allergies  Family History   Family history unknown: Yes       Social history:   status: no  Marital status: Single  Living status: alone   Work history: unknown  Tobacco use: no  Drug use: no  Alcohol use: history    Review of Systems:  As per HPI, remaining review of systems negative/unremarkable    OBJECTIVE:   Prognosis: Poor    Physical Exam:  /60   Pulse 75   Temp 98.3 °F (36.8 °C) (Oral)   Resp 14   Ht 6' (1.829 m)   Wt 131 lb 9 oz (59.7 kg)   SpO2 99%   BMI 17.84 kg/m²   Gen: Chronically ill-appearing, thin, awake, alert, emesis during visit, jaundice?   HEENT: Temporal wasting, mucosa dry, PERRLA, EOMI, sclera icteric  Neck:  Trachea midline  Lungs: Diminished breath sounds bilaterally, relatively clear to auscultation respirations unlabored  Heart[de-identified] Rapid heart rate, distant heart tones  Abd: Distended but soft, catheter  : Deferred  Ext:  Moving all extremities, no edema, pulses present  Skin: Jaundice?, without rash, bruising, petechiae  Neuro:  Alert, oriented x 3; following commands    Objective data reviewed: labs, images, records, medication use, vitals and chart  Relevant data: Per HPI    Time/Communication  Greater than 50% of time spent, total 70 minutes in counseling and coordination of care at the bedside/over the telephone regarding goals of care, symptom management, diagnosis and prognosis and see above. Thank you for allowing Palliative Medicine to participate in the care of Lyle Gómez. Provider Faviola Mcallister MD  Palliative Medicine    Note: This report was completed using [employer] voiced recognition software. Every effort has been made to ensure accuracy; however, inadvertent computerized transcription errors may be present.

## 2021-04-29 NOTE — PROGRESS NOTES
The [facility] for [address] [employer] office notified of consult, Dr Judson Terrazas is on call for the group.  Loco de jesus

## 2021-04-29 NOTE — PROGRESS NOTES
Occupational Therapy  OT BEDSIDE TREATMENT NOTE      Date:2021  Patient Name: Chano Perera  MRN: [id number]  : 1955  Room: [address] [place] [address]        Referring Provider: Nate Leal DO  Evaluating OT: Darleen Bray. Navneet OTR/L - [id number]     AM-PAC Daily Activity Raw Score:      Recommended Adaptive Equipment: Continue to assess.      Diagnosis: LETA (acute kidney injury) ([facility] [address] [N17.9]      Pertinent Medical History: hepatocellular carcinoma, HTN, hepatitis C     Precautions: falls     Home Living: Patient lives alone in a two-floor home; patient stated that he had been staying on the main living level recently. Patient had been sleeping on the couch and using a BSC recently. Laundry is in the basement. Bathroom Setup: BSC on first floor; walk-in shower in the basement   Equipment Owned: BSC     Prior Level of Function (PLOF): Patient reported that he was independent with ADLs, IADLs, and functional mobility until recently. Patient stated that a neighbor had been assisting him with some IADLs intermittently immediately prior to this hospitalization.       Pain Level: Pt reports stiffness of LE's   Cognition: Pt awake and alert. Occasionally slow to respond.       Functional Assessment:    Initial Eval Status  Date: 2021 Treatment Status   Short Term Goals   Feeding SBA       Grooming Mod A Limited participation in ADL activity due to pt fatigue.   Supervision  (seated/standing at sink)   UB Dressing SBA   Independent   LB Dressing Mod A   SBA - with use of AE, as needed/appropriate   Bathing Mod A   SBA - with use of AE/DME, as needed/appropriate   Toileting Max A Family present and stated pt has walked to and from bathroom with them several times today.   Min A   Bed Mobility  Supine-to-Sit: Min A  Sit-to-Supine: Min A   min A supine to sit      Functional Transfers Sit-to-Stand:  Mod A   from EOB Min A and cues for hand placement to increase safety technique.   SBA   Functional Mobility Min techniques to improve extremity awareness, maintain skin integrity and improve hand function   []? ? Visual/Perceptual retraining  to improve body awareness and safety during transfers and ADLs   []? ? Splinting/positioning needs to maintain joint/skin integrity and prevent contractures   [x]? ? Therapeutic activity to improve functional performance during ADLs/IADLs  [x]? ? Therapeutic exercise to improve tolerance and functional strength for ADLs/IADLs  [x]? ? Balance retraining/tolerance tasks for facilitation of postural control with dynamic challenges during ADLs   [x]? ? Neuromuscular re-education: facilitate righting/equilibrium reactions, midline orientation, scapular stability/mobility, normalize muscle tone, and facilitate active functional movement/attention  []? ? Delirium prevention/treatment   []? Positioning to improve functional independence and prevent skin breakdown   []? ?Other:        Time In: 2:06  Time Out: 2:30      Min Units   Therapeutic Ex 83182     Therapeutic Activities 65649 48 5   ADL/Self Care 46022 8 1   Orthotic Management 20144     Neuro Re-Ed 58667     Non-Billable Time     TOTAL TIMED TREATMENT [facility] [id number]

## 2021-04-29 NOTE — PROGRESS NOTES
Admit Date: 4/27/2021  Hospital day 2    Subjective:     Patient resting in bed in NAD. Still fatigued. Abd pain this am but improved. Denies CP, SOB. Objective:       I/O last 3 completed shifts: In: 250 [P.O.:240; I.V.:10]  Out: 2050 [Urine:400; Drains:1650]      BP (!) 98/54   Pulse 90   Temp 98.5 °F (36.9 °C) (Oral)   Resp 14   Ht 6' (1.829 m)   Wt 131 lb 9 oz (59.7 kg)   SpO2 99%   BMI 17.84 kg/m²   General appearance: alert, appears stated age, cachectic, cooperative, fatigued and icteric  Lungs: clear to auscultation bilaterally  Heart: regular rate and rhythm, S1, S2 normal, no murmur, click, rub or gallop  Abdomen: soft, non-tender; bowel sounds normal; no masses,  no organomegaly  Extremities: extremities normal, atraumatic, no cyanosis or edema  Skin: diffuse jaundice.        Data ReviewCBC:       Recent Results (from the past 24 hour(s))   BASIC METABOLIC PANEL    Collection Time: 04/28/21  8:26 PM   Result Value Ref Range    Sodium 130 (L) 132 - 146 mmol/L    Potassium 4.8 3.5 - 5.0 mmol/L    Chloride 101 98 - 107 mmol/L    CO2 23 22 - 29 mmol/L    Anion Gap 6 (L) 7 - 16 mmol/L    Glucose 117 (H) 74 - 99 mg/dL    BUN 34 (H) 6 - 23 mg/dL    CREATININE 1.0 0.7 - 1.2 mg/dL    GFR Non-African American >60 >=60 mL/min/1.73    GFR African American >60     Calcium 8.3 (L) 8.6 - 10.2 mg/dL   BASIC METABOLIC PANEL    Collection Time: 04/28/21 11:30 PM   Result Value Ref Range    Sodium 131 (L) 132 - 146 mmol/L    Potassium 4.9 3.5 - 5.0 mmol/L    Chloride 102 98 - 107 mmol/L    CO2 24 22 - 29 mmol/L    Anion Gap 5 (L) 7 - 16 mmol/L    Glucose 100 (H) 74 - 99 mg/dL    BUN 31 (H) 6 - 23 mg/dL    CREATININE 0.9 0.7 - 1.2 mg/dL    GFR Non-African American >60 >=60 mL/min/1.73    GFR African American >60     Calcium 8.1 (L) 8.6 - 10.2 mg/dL   BASIC METABOLIC PANEL    Collection Time: 04/29/21  3:10 AM   Result Value Ref Range    Sodium 129 (L) 132 - 146 mmol/L    Potassium 4.5 3.5 - 5.0 mmol/L    Chloride

## 2021-04-30 NOTE — CARE COORDINATION
Sw/cm met with son Natalio Renae re discharge planning. Natalio Renae aware pt refusing PT/OTevals, any activity. Pt's appetite is poor; but according to Natalio Renae pt wants to [facility] TOW on,\"as he is of stubborn nature. Scott Stafford agreeable to ST rehab, but will be unable to attain w/o PT evals; then would need to process through insurance. BAUDILIO list given to [facility]; will provide us with 3 choices. Natalio Renae feels his dad looks poor, but also mentions that his dad states he  is not ready to stop treatment. Natalio Renae would like to eventually get his dad home with Hospice and keep him comfortable. Palliative care following, await  decisions re;further plan/ agressiveness of care . Will follow. Brian العراقي.

## 2021-04-30 NOTE — PROGRESS NOTES
Admit Date: 4/27/2021  Hospital day 3    Subjective:     Patient resting in bed in NAD. lethargic. Does not feel like he will do well at home. Objective:       I/O last 3 completed shifts: In: 10 [I.V.:10]  Out: 1650 [Drains:1650]      BP (!) 110/54 Comment: manual  Pulse 78   Temp 98 °F (36.7 °C) (Oral)   Resp 16   Ht 6' (1.829 m)   Wt 134 lb 6.4 oz (61 kg)   SpO2 97%   BMI 18.23 kg/m²   General appearance: alert, appears stated age, cachectic, cooperative, fatigued and icteric  Lungs: clear to auscultation bilaterally  Heart: regular rate and rhythm, S1, S2 normal, no murmur, click, rub or gallop  Abdomen: soft, non-tender; bowel sounds normal; no masses,  no organomegaly  Extremities: extremities normal, atraumatic, no cyanosis or edema  Skin: diffuse jaundice.        Data ReviewCBC:       Recent Results (from the past 24 hour(s))   BASIC METABOLIC PANEL    Collection Time: 04/29/21 12:10 PM   Result Value Ref Range    Sodium 133 132 - 146 mmol/L    Potassium 4.7 3.5 - 5.0 mmol/L    Chloride 106 98 - 107 mmol/L    CO2 24 22 - 29 mmol/L    Anion Gap 3 (L) 7 - 16 mmol/L    Glucose 110 (H) 74 - 99 mg/dL    BUN 26 (H) 6 - 23 mg/dL    CREATININE 0.8 0.7 - 1.2 mg/dL    GFR Non-African American >60 >=60 mL/min/1.73    GFR African American >60     Calcium 8.3 (L) 8.6 - 10.2 mg/dL   BASIC METABOLIC PANEL    Collection Time: 04/29/21  4:15 PM   Result Value Ref Range    Sodium 133 132 - 146 mmol/L    Potassium 4.7 3.5 - 5.0 mmol/L    Chloride 105 98 - 107 mmol/L    CO2 21 (L) 22 - 29 mmol/L    Anion Gap 7 7 - 16 mmol/L    Glucose 98 74 - 99 mg/dL    BUN 26 (H) 6 - 23 mg/dL    CREATININE 0.8 0.7 - 1.2 mg/dL    GFR Non-African American >60 >=60 mL/min/1.73    GFR African American >60     Calcium 8.4 (L) 8.6 - 10.2 mg/dL   Comprehensive metabolic panel    Collection Time: 04/30/21  4:21 AM   Result Value Ref Range    Sodium 133 132 - 146 mmol/L    Potassium 5.0 3.5 - 5.0 mmol/L    Chloride 105 98 - 107 mmol/L CO2 23 22 - 29 mmol/L    Anion Gap 5 (L) 7 - 16 mmol/L    Glucose 81 74 - 99 mg/dL    BUN 24 (H) 6 - 23 mg/dL    CREATININE 0.9 0.7 - 1.2 mg/dL    GFR Non-African American >60 >=60 mL/min/1.73    GFR African American >60     Calcium 8.4 (L) 8.6 - 10.2 mg/dL    Total Protein 4.9 (L) 6.4 - 8.3 g/dL    Albumin 2.0 (L) 3.5 - 5.2 g/dL    Total Bilirubin 2.4 (H) 0.0 - 1.2 mg/dL    Alkaline Phosphatase 83 40 - 129 U/L    ALT 24 0 - 40 U/L    AST 86 (H) 0 - 39 U/L           Assessment:     Active Problems:    Electrolyte imbalance    Ascites    Hyperbilirubinemia    Hepatocellular carcinoma (HCC)    LETA (acute kidney injury) (HCC)  Resolved Problems:    * No resolved hospital problems. *      Plan:     Taper ivf per renal. Lytes normalized. Will get oncology consult. Drain working well according to [employer]. Follow labs and vitals closely. Appreciate SS help with discharge. Pt will be without assistance for 7-10 days. Possible transitional care facility?

## 2021-04-30 NOTE — PROGRESS NOTES
Physician Progress Note      Patti Ellison  CSN #:                  [id number]  :                       1955  ADMIT DATE:       2021 4:09 PM  DISCH DATE:  RESPONDING  PROVIDER #:        Edward Santoro DO          QUERY TEXT:    Dr. Mickey Chris,    Patient admitted with dehydration. If possible, please document in progress   notes and discharge summary if you are evaluating and /or treating any of the   following: The medical record reflects the following:  Risk Factors: Liver cancer with mets  Clinical Indicators: BMI 17, per dietary \"-Severe malnutrition, In context of   chronic illness related to catabolic illness. Erasmo Epifanio Wilkersonon Epifanio \"  Treatment: Dietary consult, Oral nutrition supplements    Thank you,  Tushar Cazares RN  Clinical Documentation Improvement  [phone]  Options provided:  -- Protein calorie malnutrition mild  -- Protein calorie malnutrition moderate  -- Protein calorie malnutrition severe  -- Underweight with BMI 17  -- Other - I will add my own diagnosis  -- Disagree - Not applicable / Not valid  -- Disagree - Clinically unable to determine / Unknown  -- Refer to Clinical Documentation Reviewer    PROVIDER RESPONSE TEXT:    This patient has severe protein calorie malnutrition.     Query created by: Gary Rico on 2021 11:01 AM      Electronically signed by:  Edward Santoro DO 2021 3:05 PM

## 2021-04-30 NOTE — PROGRESS NOTES
04/30/2021        Current Facility-Administered Medications:     ondansetron (ZOFRAN) injection 4 mg, 4 mg, Intravenous, Q6H PRN, Yane Adryan, DO, 4 mg at 04/29/21 1006    acetaminophen (TYLENOL) tablet 500 mg, 500 mg, Oral, Q6H PRN, Yane Adryan, DO    lactulose (CHRONULAC) 10 GM/15ML solution 20 g, 20 g, Oral, BID, Yane Adryan, DO, 20 g at 04/30/21 0805    Lenvatinib (12 MG Daily Dose) CPPK 12 mg, 12 mg, Oral, Daily, Yane Adryan, DO    mirtazapine (REMERON) tablet 15 mg, 15 mg, Oral, Nightly, Yane Adryan, DO, 15 mg at 04/29/21 2024    pantoprazole (PROTONIX) tablet 40 mg, 40 mg, Oral, QAM AC, Yane Adryan, DO, 40 mg at 04/30/21 4841    sennosides-docusate sodium (SENOKOT-S) 8.6-50 MG tablet 2 tablet, 2 tablet, Oral, Daily PRN, Yane Adryan, DO    sodium chloride tablet 1 g, 1 g, Oral, BID WC, Yane Adryan, DO, 1 g at 04/30/21 0805    sodium chloride flush 0.9 % injection 5-40 mL, 5-40 mL, Intravenous, BID, Yane Adryan, DO, 10 mL at 04/30/21 0805    sodium chloride flush 0.9 % injection 5-40 mL, 5-40 mL, Intravenous, PRN, Yane Adryan, DO    traMADol Rivera [place]) tablet 50 mg, 50 mg, Oral, Q6H PRN, Yane Adryan, DO      Assessment:    Active Problems:    Electrolyte imbalance    Ascites    Hyperbilirubinemia    Hepatocellular carcinoma (HCC)    LETA (acute kidney injury) ([facility] [address]  Resolved Problems:    * No resolved hospital problems. *    CT abd Pelvis 4/15/21  Interval placement of a drainage catheter in the left lower quadrant/pelvis  with small amount of pneumoperitoneum likely related to procedure.     Hepatic cirrhosis with splenomegaly and portal venous hypertension with  venous varices and large amount of ascites.     Progressive widespread metastatic malignancy with progressive widespread  pulmonary metastasis, progressive necrotic mass lesion in the right hepatic  lobe and peritoneal carcinomatosis with the extensive mesenteric and pelvic  necrotic masses and omental caking.  There may be malignant ascites. Thomas Summers  may be tumor invasion into the right portal vein.     Partially distended gallbladder with gallstones and wall thickening which  could be either due to cholecystitis versus ascites. 80-year-old known to me with Hepatocellular carcinoma. Treated with Y-90 on 12/16/20. Started Tecentriq and Bevacizumab on 12/17/20 with good response initially. Last treatment was on 3/11/21 then recently developed progression in lungs, peritoneum with ascites and liver s/p peritoneal catheter placement last week. Anemia partially due to bloody drainage around peritoneal catheter and blood ascites. Last immunotherapy treatment 3/12/21 with Tecentriq and Mariaelena Tamez was held at that time. Has not been able to have treatment since due to frequent hospitalizations. Pleurx cath placed on 4/9. Recent progression on CT 4/15/21. Start oral Lenvatinib last week. Admitted with increased nausea, vomiting, diarrhea, dehydration, and increasing fatigue. Currently with LETA, nausea with emesis, Pain to RUQ, and Hepatocellular carcinoma with mets involving abd cavity    Plan:  Discussed extensively with patient, his son over the phone and daughter in law, brother who were present in the room. He has recent progression of his liver cancer on CT abdomen/pelvis 4/15/21 and was started on palliative intent oral Lenvatinib 12 mg daily. I discussed palliative care/hospice is certainly reasonable given his advanced disease, Sonya Ochoa at this time states he is not ready for hospice and wants to keep trying the oral therapy. Recommend cutting down the dose in half, which hopefully will allow improved tolerance. Start Scopolamine patch for nausea.     Continue daily drainage of pleurx cath      Electronically signed by Daniella Jimenez MD on 4/30/2021 at 10:02 AM

## 2021-04-30 NOTE — PROGRESS NOTES
Physical Therapy    Pt refused Rx this AM, states too fatigued, wants to stay in bed and rest. Pt encouraged to get OOB without success. Wishes respected. Will follow on another date/time.   Patience Smith PTA [id number]

## 2021-04-30 NOTE — PROGRESS NOTES
Component Value Date     04/30/2021    K 5.0 04/30/2021    K 4.0 04/15/2021     04/30/2021    CO2 23 04/30/2021    BUN 24 04/30/2021    CREATININE 0.9 04/30/2021    GFRAA >60 04/30/2021    LABGLOM >60 04/30/2021    GLUCOSE 81 04/30/2021    PROT 4.9 04/30/2021    LABALBU 2.0 04/30/2021    CALCIUM 8.4 04/30/2021    BILITOT 2.4 04/30/2021    ALKPHOS 83 04/30/2021    AST 86 04/30/2021    ALT 24 04/30/2021     BMP:    Lab Results   Component Value Date     04/30/2021    K 5.0 04/30/2021    K 4.0 04/15/2021     04/30/2021    CO2 23 04/30/2021    BUN 24 04/30/2021    LABALBU 2.0 04/30/2021    CREATININE 0.9 04/30/2021    CALCIUM 8.4 04/30/2021    GFRAA >60 04/30/2021    LABGLOM >60 04/30/2021    GLUCOSE 81 04/30/2021     Calcium:    Lab Results   Component Value Date    CALCIUM 8.4 04/30/2021     Phosphorus:    Lab Results   Component Value Date    PHOS 3.5 10/18/2016     Uric Acid:  No results found for: URICACID  U/A:  No results found for: NITRU, COLORU, PHUR, LABCAST, WBCUA, RBCUA, MUCUS, TRICHOMONAS, YEAST, BACTERIA, CLARITYU, SPECGRAV, LEUKOCYTESUR, UROBILINOGEN, BILIRUBINUR, BLOODU, GLUCOSEU, KETUA, AMORPHOUS     Imaging:  Ct Abdomen Pelvis W Iv Contrast Additional Contrast? None    Result Date: 4/15/2021  EXAMINATION: CT OF THE ABDOMEN AND PELVIS WITH CONTRAST 4/15/2021 6:07 pm TECHNIQUE: CT of the abdomen and pelvis was performed with the administration of intravenous contrast. Multiplanar reformatted images are provided for review. Dose modulation, iterative reconstruction, and/or weight based adjustment of the mA/kV was utilized to reduce the radiation dose to as low as reasonably achievable. COMPARISON: 02/01/2021.  HISTORY: ORDERING SYSTEM PROVIDED HISTORY: IR paracentesis drain placed in the left abdomen, history of cirrhosis TECHNOLOGIST PROVIDED HISTORY: Additional Contrast?->None Reason for exam:->IR paracentesis drain placed in the left abdomen, history of cirrhosis Decision Support Exception->Emergency Medical Condition (MA) FINDINGS: The lung bases demonstrate extensive/ innumerable pulmonary nodules and masses with the largest 1 in the left lower lobe measuring 4.2 x 3 cm and the largest 1 in the right lower lobe measuring 3.2 x 2.6 cm with significant progression in the disease. There is atelectasis and pleural effusion in the left lower lobe. Liver is cirrhotic with nodular border. A previously noted necrotic mass in the right hepatic lobe currently measures 8.3 x 3.3 cm which is somewhat increased in size. There may be tumor invasion with occlusion of the right portal vein which is thrombosed. There is large amount of ascites. Gallbladder is partially distended with gallstones and wall thickening. There is splenomegaly with spleen measuring 19 cm. There is prominence of the portal vein concerning for portal venous hypertension with venous varices at the GE junction and adair of the liver as well as splenic hilum. Pancreas, the adrenals and the kidneys are normal.  There is extensive metastatic nodules in the mesentery with nodules and masses throughout the abdomen pelvis with largest 1 in the abdomen measuring up to 5.5 x 4.1 cm. A large necrotic mass in the pelvis in the rectovesical space measuring 10 x 8 cm with mass effect on the bladder is noted. There is nodularity of the omentum concerning for omental caking/peritoneal carcinomatosis. Subcutaneous mass in the anterior abdominal wall measuring 2.3 x 2.8 cm is noted. Some small retroperitoneal adenopathy is also noted. There is interval placement of a I had catheter in the pelvis with small amount of pneumoperitoneum with air anteriorly likely resultant from the procedure. Pelvis. Bladder is partially distended with the nodularity associated with the bladder wall, probably metastasis to the bladder wall. The previously described the necrotic mass in the pelvis/rectovesical space is noted.  Malignant ascites is noted.  There is constipation. Appendix is normal.     Interval placement of a drainage catheter in the left lower quadrant/pelvis with small amount of pneumoperitoneum likely related to procedure. Hepatic cirrhosis with splenomegaly and portal venous hypertension with venous varices and large amount of ascites. Progressive widespread metastatic malignancy with progressive widespread pulmonary metastasis, progressive necrotic mass lesion in the right hepatic lobe and peritoneal carcinomatosis with the extensive mesenteric and pelvic necrotic masses and omental caking. There may be malignant ascites. There may be tumor invasion into the right portal vein. Partially distended gallbladder with gallstones and wall thickening which could be either due to cholecystitis versus ascites. Ir Guided Tunneled Intraperitoneal Cath W Or Wo Contrast Perc    Result Date: 2021  NUMBER OF IMAGES: Patient MRN:  [id number] : 1955 Age: 77 years Gender: Male Order Date:  2021 11:37 AM EXAM: IR GUIDED TUNNELED INTRAPERITONEAL CATH W OR WO CONTRAST PERC NUMBER OF IMAGES:  4 INDICATION: C22.0 Hepatocellular carcinoma ([address] placement of plexus catheter for recurrent ascites What reading provider will be dictating this exam?->[facility] COMPARISON: None Insertion of a permanent peritoneal drainage catheter with tunnel creation: After obtaining informed consent and following the routine sterile prep and drape a combination of ultrasound and fluoroscopy were utilized to place a needle into the peritoneal cavity and ascitic fluid was obtained. Subsequently after the administration of local anesthesia a tunnel tract was created and dilated. Two separate incisions were made. Routine ultrasound and Doppler ultrasound were used. Using direct real-time ultrasound imaging peritoneal access was obtained. An image was stored and copy was transmitted to PACS.  Subsequently with real-time guidance a needle was inserted intravascular and BID  2. Continue to monitor   3. Patient not eating? 4. Prognosis is poor.

## 2021-04-30 NOTE — CARE COORDINATION
IR evaluated drain; no need for change; PCP ordered hemonc consult. [facility] 15/24. Palliative care following; await hemonc input; will d/w pt /family re; ST rehab placement, with eventual transition to home (with Hospice?). Will adddress with pt/family. Nia Gonzalez.

## 2021-04-30 NOTE — PROGRESS NOTES
Nephrology Progress Note      Events Reviewed. Patient's Name: Dustin Perez  1:36 PM  4/30/2021    Subjective: We are following Mr. Joshua Ramires for LETA. Blood pressures are better. States he feels better today.      Current Medications:    scopolamine (TRANSDERM-SCOP) transdermal patch 1 patch, Q72H  ondansetron (ZOFRAN) injection 4 mg, Q6H PRN  acetaminophen (TYLENOL) tablet 500 mg, Q6H PRN  lactulose (CHRONULAC) 10 GM/15ML solution 20 g, BID  Lenvatinib (12 MG Daily Dose) CPPK 12 mg, Daily  mirtazapine (REMERON) tablet 15 mg, Nightly  pantoprazole (PROTONIX) tablet 40 mg, QAM AC  sennosides-docusate sodium (SENOKOT-S) 8.6-50 MG tablet 2 tablet, Daily PRN  sodium chloride tablet 1 g, BID WC  sodium chloride flush 0.9 % injection 5-40 mL, BID  sodium chloride flush 0.9 % injection 5-40 mL, PRN  traMADol (ULTRAM) tablet 50 mg, Q6H PRN      Physical exam:   Constitutional:    Vitals: BP (!) 114/57   Pulse 105   Temp 97.8 °F (36.6 °C) (Oral)   Resp 16   Ht 6' (1.829 m)   Wt 134 lb 6.4 oz (61 kg)   SpO2 98%   BMI 18.23 kg/m²      Cachectic looking male in mild distress  Skin: no rash, turgor wnl  Heent:  eomi, mmm  Neck: no bruits or jvd noted  Cardiovascular:  S1, S2 without m/r/g  Respiratory: CTA B without w/r/r  Abdomen:  +bs, soft, nt, distended  Ext: no lower extremity edema  Psychiatric: mood and affect appropriate  Musculoskeletal:  Rom, muscular strength intact    Data:   Labs:  CBC:   Lab Results   Component Value Date    WBC 11.1 04/27/2021    RBC 4.50 04/27/2021    RBC  09/01/2016      RBC           [id number]    transfused   09/02/16  00:19  S[facility]    RBC  09/01/2016      RBC           [id number]    transfused   09/02/16  19:47  [facility]    HGB 10.9 04/27/2021    HCT 37.5 04/27/2021    MCV 83.3 04/27/2021    MCH 24.2 04/27/2021    MCHC 29.1 04/27/2021    RDW 21.1 04/27/2021     04/27/2021    MPV 10.6 04/27/2021     CMP:    Lab Results   Component Value Date     04/30/2021 Interval placement of a drainage catheter in the left lower quadrant/pelvis with small amount of pneumoperitoneum likely related to procedure. Hepatic cirrhosis with splenomegaly and portal venous hypertension with venous varices and large amount of ascites. Progressive widespread metastatic malignancy with progressive widespread pulmonary metastasis, progressive necrotic mass lesion in the right hepatic lobe and peritoneal carcinomatosis with the extensive mesenteric and pelvic necrotic masses and omental caking. There may be malignant ascites. There may be tumor invasion into the right portal vein. Partially distended gallbladder with gallstones and wall thickening which could be either due to cholecystitis versus ascites. Ir Guided Tunneled Intraperitoneal Cath W Or Wo Contrast Perc    Result Date: 2021  NUMBER OF IMAGES: Patient MRN:  [id number] : 1955 Age: 77 years Gender: Male Order Date:  2021 11:37 AM EXAM: IR GUIDED TUNNELED INTRAPERITONEAL CATH W OR WO CONTRAST PERC NUMBER OF IMAGES:  4 INDICATION: C22.0 Hepatocellular carcinoma ([address] placement of plexus catheter for recurrent ascites What reading provider will be dictating this exam?->[facility] COMPARISON: None Insertion of a permanent peritoneal drainage catheter with tunnel creation: After obtaining informed consent and following the routine sterile prep and drape a combination of ultrasound and fluoroscopy were utilized to place a needle into the peritoneal cavity and ascitic fluid was obtained. Subsequently after the administration of local anesthesia a tunnel tract was created and dilated. Two separate incisions were made. Routine ultrasound and Doppler ultrasound were used. Using direct real-time ultrasound imaging peritoneal access was obtained. An image was stored and copy was transmitted to PACS. Subsequently with real-time guidance a needle was inserted intravascular and through the needle a wire.  The needle tip was visualized with real time guidance as it was placed into the peritoneal space. A tunneled catheter was subsequently advanced from the first incision into the second incision and subsequently a catheter was advanced into the peritoneal space. Confirmation of position was confirmed under fluoroscopic control. Time out occurred at 1148 hours. Patient received 1 mg of Versed, 50 mcg of fentanyl and local anesthesia and was monitored for 30 minutes or less by a registered nurse. Patient received 12 seconds fluoroscopy. Subsequently ascitic fluid was drained. The patient tolerated the procedure well. The skin was sutured with 3-0 nylon. The drain was sutured in place. The cuff was placed appropriately. Successful uncomplicated placement of a Pleurx catheter for a permanent peritoneal catheter drainage. Assessment  Reji Pascual is a 77 y.o. male with past medical history of metastatic hepatocellular carcinoma with peritoneal carcinomatosis, recurrent ascites needing paracentesis, recently had an abdominal catheter placed for drainage at home, presented with chief complaints of N/V and weakness. I recently have seen him for hyponatremia and he was discharged on 4/20/21 with a BUN of 17, cr of 0.5, Na of 127, I advised him to take salt tabs 1 gm PO BID for 1 week after discharge along with a strict 1 L fluid restriction and a general diet with salt and protein intake. Labs from the ER showed a BUN of 41, cr of 1.3, K of 5.6 along with hypotension      1. Acute Kidney Injury secondary to severe pre renal azotemia from volume depletion and hypotension- now resolved. 2. Hyperkalemia secondary to LETA and acidosis- now resolved   3. Chronic hyponatremia secondary to hypovolemia, poor solute intake and excess free water intake- sodium level stable today at 133    Plan    1. Stop  IVF  2. Continue Sodium chloride tablets 1 gm PO BID  3. Repeat Urine electrolytes, creatinine, osmolality  4.  Continue to monitor Ramone Walton MD

## 2021-04-30 NOTE — CARE COORDINATION
Social Work / Discharge Planning :SW and CM met with patient son and discussed goals at discharge at length. SNF list provided and he will provide SW/CM with top three choices. Will need therapy input. Patient still wanting oral chemo and this needs to be discussed as well as Pleur-X cath to which facility can accept. SW to follow. Electronically signed by MELISSA Griffin on 4/30/21 at 2:04 PM EDT     Addendum : Son provided SW with three SNF choices : 1.) [facility] 2[facility] and 3.) [facility]. SW made referral to [facility] but will need therapy input to complete referral. SW to follow.  Electronically signed by MELISSA Griffin on 4/30/21 at 3:01 PM EDT

## 2021-05-01 NOTE — PROGRESS NOTES
Nephrology Progress Note      Events Reviewed. Patient's Name: Omid Manzano  12:44 PM  5/1/2021    Subjective: We are following Mr. Javier Tamez for LETA. Not responding to any questions. Family at bedside, states he is more awake today but not as alert.      Current Medications:    heparin flush 100 UNIT/ML injection 300 Units, BID  heparin flush 100 UNIT/ML injection 100 Units, PRN  scopolamine (TRANSDERM-SCOP) transdermal patch 1 patch, Q72H  ondansetron (ZOFRAN) injection 4 mg, Q6H PRN  acetaminophen (TYLENOL) tablet 500 mg, Q6H PRN  lactulose (CHRONULAC) 10 GM/15ML solution 20 g, BID  Lenvatinib (12 MG Daily Dose) CPPK 12 mg, Daily  mirtazapine (REMERON) tablet 15 mg, Nightly  pantoprazole (PROTONIX) tablet 40 mg, QAM AC  sennosides-docusate sodium (SENOKOT-S) 8.6-50 MG tablet 2 tablet, Daily PRN  sodium chloride tablet 1 g, BID WC  sodium chloride flush 0.9 % injection 5-40 mL, BID  sodium chloride flush 0.9 % injection 5-40 mL, PRN  traMADol (ULTRAM) tablet 50 mg, Q6H PRN      Physical exam:   Constitutional:    Vitals: BP (!) 96/58 Comment: mANUAL  Pulse 98   Temp 97.6 °F (36.4 °C) (Oral)   Resp 16   Ht 6' (1.829 m)   Wt 139 lb 3.2 oz (63.1 kg)   SpO2 98%   BMI 18.88 kg/m²      Cachectic looking male in mild distress  Skin: no rash, turgor wnl  Heent:  eomi, mmm  Neck: no bruits or jvd noted  Cardiovascular:  S1, S2 without m/r/g  Respiratory: CTA B without w/r/r  Abdomen:  +bs, soft, nt, distended  Ext: no lower extremity edema  Psychiatric: mood and affect appropriate  Musculoskeletal:  Rom, muscular strength intact    Data:   Labs:  CBC:   Lab Results   Component Value Date    WBC 11.1 04/27/2021    RBC 4.50 04/27/2021    RBC  09/01/2016      RBC           [id number]    transfused   09/02/16  00:19  SCC    RBC  09/01/2016      RBC           [id number]    transfused   09/02/16  19:47  SCC    HGB 10.9 04/27/2021    HCT 37.5 04/27/2021    MCV 83.3 04/27/2021    MCH 24.2 04/27/2021 MCHC 29.1 04/27/2021    RDW 21.1 04/27/2021     04/27/2021    MPV 10.6 04/27/2021     CMP:    Lab Results   Component Value Date     05/01/2021    K 4.2 05/01/2021    K 4.0 04/15/2021    CL 99 05/01/2021    CO2 21 05/01/2021    BUN 23 05/01/2021    CREATININE 0.8 05/01/2021    GFRAA >60 05/01/2021    LABGLOM >60 05/01/2021    GLUCOSE 89 05/01/2021    PROT 4.9 05/01/2021    LABALBU 2.6 05/01/2021    CALCIUM 8.3 05/01/2021    BILITOT 2.7 05/01/2021    ALKPHOS 67 05/01/2021    AST 67 05/01/2021    ALT 20 05/01/2021     BMP:    Lab Results   Component Value Date     05/01/2021    K 4.2 05/01/2021    K 4.0 04/15/2021    CL 99 05/01/2021    CO2 21 05/01/2021    BUN 23 05/01/2021    LABALBU 2.6 05/01/2021    CREATININE 0.8 05/01/2021    CALCIUM 8.3 05/01/2021    GFRAA >60 05/01/2021    LABGLOM >60 05/01/2021    GLUCOSE 89 05/01/2021     Calcium:    Lab Results   Component Value Date    CALCIUM 8.3 05/01/2021     Phosphorus:    Lab Results   Component Value Date    PHOS 3.5 10/18/2016     Uric Acid:  No results found for: URICACID  U/A:  No results found for: NITRU, COLORU, PHUR, LABCAST, WBCUA, RBCUA, MUCUS, TRICHOMONAS, YEAST, BACTERIA, CLARITYU, SPECGRAV, LEUKOCYTESUR, UROBILINOGEN, BILIRUBINUR, BLOODU, GLUCOSEU, KETUA, AMORPHOUS     Imaging:  Ct Abdomen Pelvis W Iv Contrast Additional Contrast? None    Result Date: 4/15/2021  EXAMINATION: CT OF THE ABDOMEN AND PELVIS WITH CONTRAST 4/15/2021 6:07 pm TECHNIQUE: CT of the abdomen and pelvis was performed with the administration of intravenous contrast. Multiplanar reformatted images are provided for review. Dose modulation, iterative reconstruction, and/or weight based adjustment of the mA/kV was utilized to reduce the radiation dose to as low as reasonably achievable. COMPARISON: 02/01/2021.  HISTORY: ORDERING SYSTEM PROVIDED HISTORY: IR paracentesis drain placed in the left abdomen, history of cirrhosis TECHNOLOGIST PROVIDED HISTORY: Additional Contrast?->None Reason for exam:->IR paracentesis drain placed in the left abdomen, history of cirrhosis Decision Support Exception->Emergency Medical Condition (MA) FINDINGS: The lung bases demonstrate extensive/ innumerable pulmonary nodules and masses with the largest 1 in the left lower lobe measuring 4.2 x 3 cm and the largest 1 in the right lower lobe measuring 3.2 x 2.6 cm with significant progression in the disease. There is atelectasis and pleural effusion in the left lower lobe. Liver is cirrhotic with nodular border. A previously noted necrotic mass in the right hepatic lobe currently measures 8.3 x 3.3 cm which is somewhat increased in size. There may be tumor invasion with occlusion of the right portal vein which is thrombosed. There is large amount of ascites. Gallbladder is partially distended with gallstones and wall thickening. There is splenomegaly with spleen measuring 19 cm. There is prominence of the portal vein concerning for portal venous hypertension with venous varices at the GE junction and adair of the liver as well as splenic hilum. Pancreas, the adrenals and the kidneys are normal.  There is extensive metastatic nodules in the mesentery with nodules and masses throughout the abdomen pelvis with largest 1 in the abdomen measuring up to 5.5 x 4.1 cm. A large necrotic mass in the pelvis in the rectovesical space measuring 10 x 8 cm with mass effect on the bladder is noted. There is nodularity of the omentum concerning for omental caking/peritoneal carcinomatosis. Subcutaneous mass in the anterior abdominal wall measuring 2.3 x 2.8 cm is noted. Some small retroperitoneal adenopathy is also noted. There is interval placement of a I had catheter in the pelvis with small amount of pneumoperitoneum with air anteriorly likely resultant from the procedure. Pelvis.   Bladder is partially distended with the nodularity associated with the bladder wall, probably metastasis to the bladder wall.  The previously described the necrotic mass in the pelvis/rectovesical space is noted. Malignant ascites is noted. There is constipation. Appendix is normal.     Interval placement of a drainage catheter in the left lower quadrant/pelvis with small amount of pneumoperitoneum likely related to procedure. Hepatic cirrhosis with splenomegaly and portal venous hypertension with venous varices and large amount of ascites. Progressive widespread metastatic malignancy with progressive widespread pulmonary metastasis, progressive necrotic mass lesion in the right hepatic lobe and peritoneal carcinomatosis with the extensive mesenteric and pelvic necrotic masses and omental caking. There may be malignant ascites. There may be tumor invasion into the right portal vein. Partially distended gallbladder with gallstones and wall thickening which could be either due to cholecystitis versus ascites. Ir Guided Tunneled Intraperitoneal Cath W Or Wo Contrast Perc    Result Date: 2021  NUMBER OF IMAGES: Patient MRN:  [id number] : 1955 Age: 77 years Gender: Male Order Date:  2021 11:37 AM EXAM: IR GUIDED TUNNELED INTRAPERITONEAL CATH W OR WO CONTRAST PERC NUMBER OF IMAGES:  4 INDICATION: C22.0 Hepatocellular carcinoma ([address] placement of plexus catheter for recurrent ascites What reading provider will be dictating this exam?->[facility] COMPARISON: None Insertion of a permanent peritoneal drainage catheter with tunnel creation: After obtaining informed consent and following the routine sterile prep and drape a combination of ultrasound and fluoroscopy were utilized to place a needle into the peritoneal cavity and ascitic fluid was obtained. Subsequently after the administration of local anesthesia a tunnel tract was created and dilated. Two separate incisions were made. Routine ultrasound and Doppler ultrasound were used. Using direct real-time ultrasound imaging peritoneal access was obtained.  An image was stored and copy was transmitted to PACS. Subsequently with real-time guidance a needle was inserted intravascular and through the needle a wire. The needle tip was visualized with real time guidance as it was placed into the peritoneal space. A tunneled catheter was subsequently advanced from the first incision into the second incision and subsequently a catheter was advanced into the peritoneal space. Confirmation of position was confirmed under fluoroscopic control. Time out occurred at 1148 hours. Patient received 1 mg of Versed, 50 mcg of fentanyl and local anesthesia and was monitored for 30 minutes or less by a registered nurse. Patient received 12 seconds fluoroscopy. Subsequently ascitic fluid was drained. The patient tolerated the procedure well. The skin was sutured with 3-0 nylon. The drain was sutured in place. The cuff was placed appropriately. Successful uncomplicated placement of a Pleurx catheter for a permanent peritoneal catheter drainage. Assessment  Windy Garcia is a 77 y.o. male with past medical history of metastatic hepatocellular carcinoma with peritoneal carcinomatosis, recurrent ascites needing paracentesis, recently had an abdominal catheter placed for drainage at home, presented with chief complaints of N/V and weakness. I recently have seen him for hyponatremia and he was discharged on 4/20/21 with a BUN of 17, cr of 0.5, Na of 127, I advised him to take salt tabs 1 gm PO BID for 1 week after discharge along with a strict 1 L fluid restriction and a general diet with salt and protein intake. Labs from the ER showed a BUN of 41, cr of 1.3, K of 5.6 along with hypotension      1. Acute Kidney Injury secondary to severe pre renal azotemia from volume depletion and hypotension- now resolved. Renal function stable. 2. Hyperkalemia secondary to LETA and acidosis- now resolved   3.  Chronic hyponatremia secondary to hypovolemia, poor solute intake and excess free water intake- sodium dropped to 129 today. Will start on fluid restriction. 4. Hypoalbuminemia- secondary to cancer and poor nutrition    Plan    1. Continue sodium chloride tablets 1 gm PO BID  2. Continue to monitor kidney function   3. Continue to monitor sodium levels  4. Obtain urine indices  5. 1.2 L fluid restriction  6. Monitor BP  7. Prognosis is poor.

## 2021-05-01 NOTE — PROGRESS NOTES
[facility]) Hospitalist Progress Note    Admission Date  4/27/2021  4:09 PM  Chief Complaint Dehydration  Admit Dx   LETA (acute kidney injury) ([facility] [address] [N17.9]    Subjective  History of Present Illness  Pt seen at bedside w children Stefanie Segundo and Ike Esqueda present. Pt more awake today but less alert per both children and nursing. Sons note pt is having some visual hallucinations and occasionally has some very short-lived chest pains. Pt himself did not answer any questions today but when I was introducing myself to his sons he held up his hand to shake. In no distress on my visit. Sons report he has not been taking his lactulose. Review of Systems - unable to obtain    Objective  Physical Exam  Vitals: BP (!) 96/58 Comment: mANUAL  Pulse 98   Temp 97.6 °F (36.4 °C) (Oral)   Resp 16   Ht 6' (1.829 m)   Wt 139 lb 3.2 oz (63.1 kg)   SpO2 98%   BMI 18.88 kg/m²   General: well-developed, well-nourished, no acute distress, cooperative  Skin: warm, dry, intact, jaundiced  HEENT: normocephalic, atraumatic, mucous membranes normal  Respiratory: clear to auscultation bilaterally without respiratory distress  Cardiovascular: regular rate and rhythm without murmur / rub / gallop  Abdominal: soft, nontender, normoactive bowel sounds, distended w drain in place  Extremities: no mottling, pulses intact, no edema  Neurologic: awake, alert, not answering questions or following commands  Psychiatric: unable to assess    Assessment / Plan  HCC w hyperbilirubinemia and ascites - fairly clear that this will cause pt's demise in the near future. Recently switched chemotherapeutic agents, most recently lenvatinib. Prior to this had radiographic progression of dz and was placed on the lenvatinib w palliative intent. Pt himself appears resistant to hospice, though family agreeable. Obviously holding off for now as pt does not want. Continue to drain, fluid restrict, monitor.   Consultant input reviewed and appreciated.     Electrolyte imbalance - hyperK d/t LETA and acidosis now 4.2; hypoNa chronic as per nephro who is following    LETA - resolved    Code status  DNRCCA  DVT prophylaxis None currently  Disposition  To be determined    Electronically signed by Cecily Alexander DO on 5/1/2021 at 3:54 PM

## 2021-05-01 NOTE — CONSULTS
5/1/2021  12:41 PM      Nutrition Consult    Type and Reason for Visit: Consult    Nutrition Recommendations/Plan: Continue Dental Soft diet and ONS, as tolerated    Nutrition Assessment:  Consult re: Poor appetite/intake 5 or more days d/t pt End-stage cirrhosis/CA - Full Nutrition assessment completed 4/28 and Standard High Calorie and Frozen ONS were started at that time. Pt continues to have poor appetite on current Dental Soft Diet and meets criteria for Severe Malnutrition. Pt fatigue/lethargy contribute to inadequate oral intake but d/t end-stage status, aggressive means of nutrition support may not be appropriate or desirable. Will continue to monitor pt status and POC. Malnutrition Assessment:  Malnutrition Status: Severe malnutrition    Current Nutrition Therapies:    DIET GENERAL; Dental Soft  Dietary Nutrition Supplements: Standard High Calorie Oral Supplement  Dietary Nutrition Supplements: Frozen Oral Supplement      Nutrition Interventions:   Food and/or Nutrient Delivery:  Continue Current Diet, Start Oral Nutrition Supplement(Start Ensure Enlive BID & Magic Cup BID)  Nutrition Education/Counseling:  Education not indicated   Coordination of Nutrition Care:  Continue to monitor while inpatient    Goals:  Pt will consume >25% meals/ONS       Nutrition Monitoring and Evaluation:   Behavioral-Environmental Outcomes:  None Identified   Food/Nutrient Intake Outcomes:  Food and Nutrient Intake, Supplement Intake  Physical Signs/Symptoms Outcomes:  Biochemical Data, Chewing or Swallowing, Fluid Status or Edema, Nutrition Focused Physical Findings, Skin, Weight     Discharge Planning:     Too soon to determine     Electronically signed by Katlyn Zuñiga RD, CNSC, LD on 5/1/21 at 12:41 PM EDT    Contact: [phone]

## 2021-05-02 NOTE — PROGRESS NOTES
Physical Therapy    Facility/Department: [address] MED SURG/TELE  Treatment Note  NAME: Ubaldo Rincon  : 1955  MRN: [id number]    Date of Service: 2021      Referring Provider:  Janell Rubio DO    Evaluating Therapist: Yesenia GALLO    Room #:533  DIAGNOSIS: Acute Kidney Injury  Additional Pertinent History: Cirrhosis, Hepatocellular carcinoma, pleurex catheter  PRECAUTIONS: falls, alarm    Social:  Pt lives alone in a 2 floor plan . Independent without device. Initial Evaluation  Date: 21 Treatment      Short Term/ Long Term   Goals   Was pt agreeable to Eval/treatment? yes Non-verbal, but participated with PT    Does pt have pain? L LE No c/o pain or apparent pain    Bed Mobility  Rolling: min assist  Supine to sit: min assist  Sit to supine: min assist  Scooting: min assist Rolling: MIN A  Supine to sit: MOD A  Sit to supine: NA  Scooting: MIN A SBA   Transfers Sit to stand: mod assist  Stand to sit: mod assist  Stand pivot: NT Sit to stand: MIN A  Stand to sit: MIN A  Stand pivot: MOD A with ww CGA   Ambulation    5 feet with ww with min assist 3 feet with ww MOD A 150 feet with ww with CGA   Stair Negotiation  Ascended and descended  NT NA  4-12 steps with 1 rail with CGA   AM-PAC Raw score               15/24 14/24      BLE ROM is WFL  BLE Strength is grossly 4-/5  Balance: sitting EOB MIN A/SBA and standing with ww MIN A/MOD A    Patient education  Pt educated on hand placement during transfers. Patient response to education:   Pt verbalized understanding Pt demonstrated skill Pt requires further education in this area   No, pt did not speak inconsistent yes     ASSESSMENT:    Comments:  Pt was non-verbal throughout PT treatment, but did follow directions and participate with PT. He was unsteady on his feet with ww and at times let go of ww and required VCs and tactile cues to correct.   Pt fatigued with light activity and this limited amb distance and makes him a high fall risk at this time. Treatment:  Patient practiced and was instructed in the following treatment:     Bed mobility, transfers, sat EOB, and gait with ww to improve functional strength, balance, and endurance. Pt was left sitting up in recliner chair with BLE elevated and with call light left by patient. Chair/bed alarm: chair alarm was activated. PLAN:    Pt is making good progress toward established Physical Therapy goals. Continue with physical therapy current plan of care. Time in  08:10  Time out  08:30    Total Treatment Time  20 minutes     Evaluation Time includes thorough review of current medical information, gathering information on past medical history/social history and prior level of function, completion of standardized testing/informal observation of tasks, assessment of data and education on plan of care and goals. CPT codes:  [] Low Complexity PT evaluation 07408  [] Moderate Complexity PT evaluation 35895  [] High Complexity PT evaluation 23918  [] PT Re-evaluation 95081  [] Gait training 66221 ** minutes  [] Manual therapy 53538 ** minutes  [x] Therapeutic activities 72290 20 minutes  [] Therapeutic exercises 96662 ** minutes  [] Neuromuscular reeducation 09932 ** minutes     Timothy B. Robley Mcburney., P.T.   License Number: [id number]

## 2021-05-02 NOTE — PROGRESS NOTES
[facility]) Hospitalist Progress Note    Admission Date  4/27/2021  4:09 PM  Chief Complaint Dehydration  Admit Dx   LETA (acute kidney injury) ([facility] [address] [N17.9]    Subjective  History of Present Illness  Patient seen at bedside without family this morning, he is in no distress, awake but appears less alert today. Not answering questions or following commands unfortunately. Discussed with charge nurse, I do realize that the patient in the recent past had expressed a desire to continue with treatment, it seems fairly clear that his current pathology will claim his life, and patient does appear significantly altered, potentially to the point where he was not quite in his right mind when asked about hospice earlier. Would at least like them to evaluate the situation and speak with the patient and his family, not committing to anything at this point. Review of Systems - unable to obtain    Objective  Physical Exam  Vitals: BP 98/65   Pulse 114   Temp 97.8 °F (36.6 °C)   Resp 16   Ht 6' (1.829 m)   Wt 139 lb 3.2 oz (63.1 kg)   SpO2 100%   BMI 18.88 kg/m²   General: well-developed, well-nourished, no acute distress, cooperative  Skin: warm, dry, intact, jaundiced  HEENT: normocephalic, atraumatic, mucous membranes normal  Respiratory: clear to auscultation bilaterally without respiratory distress  Cardiovascular: regular rate and rhythm without murmur / rub / gallop  Abdominal: soft, nontender, normoactive bowel sounds, distended w drain in place  Extremities: no mottling, pulses intact, no edema  Neurologic: awake, less alert today, not answering questions or following commands  Psychiatric: unable to assess    Assessment / Plan  HCC w hyperbilirubinemia and ascites - fairly clear that this will cause pt's demise in the near future. Recently switched chemotherapeutic agents, most recently lenvatinib. Prior to this had radiographic progression of dz and was placed on the lenvatinib w palliative intent. Pt himself appears resistant to hospice, though family agreeable. Obviously holding off for now as pt does not want. Continue to drain, fluid restrict, monitor. Consultant input reviewed and appreciated.     Electrolyte imbalance - hyperK d/t LETA and acidosis now 5.2 no hyperkalemic changes on tele, continue to monitor    LETA - resolved    Code status  DNRCCA  DVT prophylaxis None currently  Disposition  To be determined    Electronically signed by Fanny Church DO on 5/2/2021 at 1:45 PM

## 2021-05-02 NOTE — PLAN OF CARE
Problem: Falls - Risk of:  Goal: Will remain free from falls  Description: Will remain free from falls  Outcome: Met This Shift     Problem: Skin Integrity:  Goal: Will show no infection signs and symptoms  Description: Will show no infection signs and symptoms  Outcome: Met This Shift     Problem: Pain:  Goal: Pain level will decrease  Description: Pain level will decrease  Outcome: Met This Shift     Problem: Injury - Risk of, Physical Injury:  Goal: Will remain free from falls  Description: Will remain free from falls  Outcome: Met This Shift     Problem: Confusion - Acute:  Goal: Absence of continued neurological deterioration signs and symptoms  Description: Absence of continued neurological deterioration signs and symptoms  Outcome: Ongoing

## 2021-05-02 NOTE — PROGRESS NOTES
Nephrology Progress Note      Events Reviewed. Patient's Name: Gustabo Meza  3:12 PM  5/2/2021    Subjective: We are following Mr. Osmar Gonzalez for LETA. Again, not responding to any questions. Nursing states he is becoming more confused.      Current Medications:    heparin flush 100 UNIT/ML injection 300 Units, BID  heparin flush 100 UNIT/ML injection 100 Units, PRN  scopolamine (TRANSDERM-SCOP) transdermal patch 1 patch, Q72H  ondansetron (ZOFRAN) injection 4 mg, Q6H PRN  acetaminophen (TYLENOL) tablet 500 mg, Q6H PRN  lactulose (CHRONULAC) 10 GM/15ML solution 20 g, BID  mirtazapine (REMERON) tablet 15 mg, Nightly  pantoprazole (PROTONIX) tablet 40 mg, QAM AC  sennosides-docusate sodium (SENOKOT-S) 8.6-50 MG tablet 2 tablet, Daily PRN  sodium chloride tablet 1 g, BID WC  sodium chloride flush 0.9 % injection 5-40 mL, BID  sodium chloride flush 0.9 % injection 5-40 mL, PRN  traMADol (ULTRAM) tablet 50 mg, Q6H PRN      Physical exam:   Constitutional:    Vitals: BP 98/65 Comment: manual by PCA  Pulse 114   Temp 97.8 °F (36.6 °C) (Axillary)   Resp 16   Ht 6' (1.829 m)   Wt 139 lb 3.2 oz (63.1 kg)   SpO2 100%   BMI 18.88 kg/m²      Cachectic looking male in mild distress  Skin: no rash, turgor wnl  Heent:  eomi, mmm  Neck: no bruits or jvd noted  Cardiovascular:  S1, S2 without m/r/g  Respiratory: CTA B without w/r/r  Abdomen:  +bs, soft, nt, distended  Ext: no lower extremity edema  Psychiatric: mood and affect appropriate  Musculoskeletal:  Rom, muscular strength intact    Data:   Labs:  CBC:   Lab Results   Component Value Date    WBC 11.1 04/27/2021    RBC 4.50 04/27/2021    RBC  09/01/2016      RBC           [id number]    transfused   09/02/16  00:19  SCC    RBC  09/01/2016      RBC           [id number]    transfused   09/02/16  19:47  SCC    HGB 10.9 04/27/2021    HCT 37.5 04/27/2021    MCV 83.3 04/27/2021    MCH 24.2 04/27/2021    MCHC 29.1 04/27/2021    RDW 21.1 04/27/2021    PLT 164 04/27/2021    MPV 10.6 04/27/2021     CMP:    Lab Results   Component Value Date     05/02/2021    K 5.2 05/02/2021    K 4.0 04/15/2021     05/02/2021    CO2 21 05/02/2021    BUN 20 05/02/2021    CREATININE 0.7 05/02/2021    GFRAA >60 05/02/2021    LABGLOM >60 05/02/2021    GLUCOSE 95 05/02/2021    PROT 5.4 05/02/2021    LABALBU 3.1 05/02/2021    CALCIUM 8.8 05/02/2021    BILITOT 3.4 05/02/2021    ALKPHOS 65 05/02/2021    AST 63 05/02/2021    ALT 19 05/02/2021     BMP:    Lab Results   Component Value Date     05/02/2021    K 5.2 05/02/2021    K 4.0 04/15/2021     05/02/2021    CO2 21 05/02/2021    BUN 20 05/02/2021    LABALBU 3.1 05/02/2021    CREATININE 0.7 05/02/2021    CALCIUM 8.8 05/02/2021    GFRAA >60 05/02/2021    LABGLOM >60 05/02/2021    GLUCOSE 95 05/02/2021     Calcium:    Lab Results   Component Value Date    CALCIUM 8.8 05/02/2021     Phosphorus:    Lab Results   Component Value Date    PHOS 3.5 10/18/2016     Uric Acid:  No results found for: URICACID  U/A:  No results found for: NITRU, COLORU, PHUR, LABCAST, WBCUA, RBCUA, MUCUS, TRICHOMONAS, YEAST, BACTERIA, CLARITYU, SPECGRAV, LEUKOCYTESUR, UROBILINOGEN, BILIRUBINUR, BLOODU, GLUCOSEU, KETUA, AMORPHOUS     Imaging:  Ct Abdomen Pelvis W Iv Contrast Additional Contrast? None    Result Date: 4/15/2021  EXAMINATION: CT OF THE ABDOMEN AND PELVIS WITH CONTRAST 4/15/2021 6:07 pm TECHNIQUE: CT of the abdomen and pelvis was performed with the administration of intravenous contrast. Multiplanar reformatted images are provided for review. Dose modulation, iterative reconstruction, and/or weight based adjustment of the mA/kV was utilized to reduce the radiation dose to as low as reasonably achievable. COMPARISON: 02/01/2021.  HISTORY: ORDERING SYSTEM PROVIDED HISTORY: IR paracentesis drain placed in the left abdomen, history of cirrhosis TECHNOLOGIST PROVIDED HISTORY: Additional Contrast?->None Reason for exam:->IR paracentesis drain placed in the left abdomen, history of cirrhosis Decision Support Exception->Emergency Medical Condition (MA) FINDINGS: The lung bases demonstrate extensive/ innumerable pulmonary nodules and masses with the largest 1 in the left lower lobe measuring 4.2 x 3 cm and the largest 1 in the right lower lobe measuring 3.2 x 2.6 cm with significant progression in the disease. There is atelectasis and pleural effusion in the left lower lobe. Liver is cirrhotic with nodular border. A previously noted necrotic mass in the right hepatic lobe currently measures 8.3 x 3.3 cm which is somewhat increased in size. There may be tumor invasion with occlusion of the right portal vein which is thrombosed. There is large amount of ascites. Gallbladder is partially distended with gallstones and wall thickening. There is splenomegaly with spleen measuring 19 cm. There is prominence of the portal vein concerning for portal venous hypertension with venous varices at the GE junction and adair of the liver as well as splenic hilum. Pancreas, the adrenals and the kidneys are normal.  There is extensive metastatic nodules in the mesentery with nodules and masses throughout the abdomen pelvis with largest 1 in the abdomen measuring up to 5.5 x 4.1 cm. A large necrotic mass in the pelvis in the rectovesical space measuring 10 x 8 cm with mass effect on the bladder is noted. There is nodularity of the omentum concerning for omental caking/peritoneal carcinomatosis. Subcutaneous mass in the anterior abdominal wall measuring 2.3 x 2.8 cm is noted. Some small retroperitoneal adenopathy is also noted. There is interval placement of a I had catheter in the pelvis with small amount of pneumoperitoneum with air anteriorly likely resultant from the procedure. Pelvis. Bladder is partially distended with the nodularity associated with the bladder wall, probably metastasis to the bladder wall.   The previously described the necrotic mass in the pelvis/rectovesical space is noted. Malignant ascites is noted. There is constipation. Appendix is normal.     Interval placement of a drainage catheter in the left lower quadrant/pelvis with small amount of pneumoperitoneum likely related to procedure. Hepatic cirrhosis with splenomegaly and portal venous hypertension with venous varices and large amount of ascites. Progressive widespread metastatic malignancy with progressive widespread pulmonary metastasis, progressive necrotic mass lesion in the right hepatic lobe and peritoneal carcinomatosis with the extensive mesenteric and pelvic necrotic masses and omental caking. There may be malignant ascites. There may be tumor invasion into the right portal vein. Partially distended gallbladder with gallstones and wall thickening which could be either due to cholecystitis versus ascites. Ir Guided Tunneled Intraperitoneal Cath W Or Wo Contrast Perc    Result Date: 2021  NUMBER OF IMAGES: Patient MRN:  [id number] : 1955 Age: 77 years Gender: Male Order Date:  2021 11:37 AM EXAM: IR GUIDED TUNNELED INTRAPERITONEAL CATH W OR WO CONTRAST PERC NUMBER OF IMAGES:  4 INDICATION: C22.0 Hepatocellular carcinoma ([address] placement of plexus catheter for recurrent ascites What reading provider will be dictating this exam?->[facility] COMPARISON: None Insertion of a permanent peritoneal drainage catheter with tunnel creation: After obtaining informed consent and following the routine sterile prep and drape a combination of ultrasound and fluoroscopy were utilized to place a needle into the peritoneal cavity and ascitic fluid was obtained. Subsequently after the administration of local anesthesia a tunnel tract was created and dilated. Two separate incisions were made. Routine ultrasound and Doppler ultrasound were used. Using direct real-time ultrasound imaging peritoneal access was obtained. An image was stored and copy was transmitted to PACS.  Subsequently with 4. Hypoalbuminemia- secondary to cancer and poor nutrition    Plan    · Continue sodium chloride tablets 1 gm PO BID  · Low potassium diet   · Continue to monitor kidney function   · Continue to monitor sodium levels  · Monitor potassium level  · 1.2 L fluid restriction  · Monitor BP  · Prognosis is poor.

## 2021-05-02 NOTE — PROGRESS NOTES
Referral received. Chart reviewed. Does not appear patient/family was choiced for hospice agency. Called placed to unit. Asked RN to please reach out to family and choice them for a hospice agency. If they would like to speak with [facility], please call back and will have nurse liaison provide information.

## 2021-05-02 NOTE — PROGRESS NOTES
plure-X drained 1900 ml out, drainage yellow cloudy, BP and HR taken, [place] [address], DR. Almodovar is made aware of the above, no new orders, continue to monitor

## 2021-05-03 NOTE — PROGRESS NOTES
Occupational Therapy  OT BEDSIDE TREATMENT NOTE      Date:5/3/2021  Patient Name: Gabriel Baumann  MRN: [id number]  : 1955  Room: [address] [place] [address]     Referring Marivel [address], DO  Evaluating OT: [facility]. Navneet OTR/L - [id number]     AM-PAC Daily Activity Raw Score:      Recommended Adaptive Equipment: Continue to assess.      Diagnosis: LETA (acute kidney injury) ([facility] [address] [N17.9]      Pertinent Medical History: hepatocellular carcinoma, HTN, hepatitis C     Precautions: falls     Home Living: Patient lives alone in a two-floor home; patient stated that he had been staying on the main living level recently. Patient had been sleeping on the couch and using a BSC recently. Laundry is in the basement. Bathroom Setup: BSC on first floor; walk-in shower in the basement   Equipment Owned: BSC     Prior Level of Function (PLOF): Patient reported that he was independent with ADLs, IADLs, and functional mobility until recently. Patient stated that a neighbor had been assisting him with some IADLs intermittently immediately prior to this hospitalization.        Pain Level: No c/o pain  Cognition: Pt alert and conversing with confusion noted.   Pt required increased processing time and demonstrated difficulty responding appropriately to therapist's questions.       Functional Assessment:    Initial Eval Status  Date: 2021 Treatment Status 5/3/21    Short Term Goals   Feeding SBA       Grooming Mod A  Supervision  (seated/standing at sink)   UB Dressing SBA  Independent   LB Dressing Mod A  SBA - with use of AE, as needed/appropriate   Bathing Mod A  SBA - with use of AE/DME, as needed/appropriate   Toileting Max A Pt declined need for toileting Min A   Bed Mobility  Supine-to-Sit: Min A  Sit-to-Supine: Min A  Sit to supine: Min A     Functional Transfers Sit-to-Stand: Mod A   from EOB Min A and cues for hand placement to increase safety technique.   SBA   Functional Mobility Min A  (with walker) for few steps maintain skin integrity and improve hand function   []? ? ? Visual/Perceptual retraining  to improve body awareness and safety during transfers and ADLs   []? ?? Splinting/positioning needs to maintain joint/skin integrity and prevent contractures   [x]? ? ? Therapeutic activity to improve functional performance during ADLs/IADLs  [x]? ? ? Therapeutic exercise to improve tolerance and functional strength for ADLs/IADLs  [x]? ? ? Balance retraining/tolerance tasks for facilitation of postural control with dynamic challenges during ADLs   [x]? ? ?Neuromuscular re-education: facilitate righting/equilibrium reactions, midline orientation, scapular stability/mobility, normalize muscle tone, and facilitate active functional movement/attention  []? ? ? Delirium prevention/treatment   []? ? Positioning to improve functional independence and prevent skin breakdown   []? ??Other:     Treatment Charges: Mins Units   Ther Ex  [id number]     Manual Therapy [id number] 1   ADL/Home Mgt [id number]     Neuro Re-ed [id number]     Group Therapy      Orthotic manage/training  [id number]     Non-Billable Time       Time In: 1:00  Time Out: 1:10  Total Time: [address] [place]/CE [id number]

## 2021-05-03 NOTE — PROGRESS NOTES
Admit Date: 4/27/2021  Hospital day 6    Subjective:     Patient resting in bed in NAD. Extremely lethargic. Not responding. Objective:       I/O last 3 completed shifts: In: 657.2 [P.O.:350; I.V.:307.2]  Out: 275 [Urine:275]      BP 84/60   Pulse 99   Temp 98 °F (36.7 °C) (Oral)   Resp 16   Ht 6' (1.829 m)   Wt 134 lb 6.4 oz (61 kg)   SpO2 97%   BMI 18.23 kg/m²   General appearance: alert, appears stated age, cachectic, cooperative, fatigued and icteric  Lungs: clear to auscultation bilaterally  Heart: regular rate and rhythm, S1, S2 normal, no murmur, click, rub or gallop  Abdomen: soft, non-tender; bowel sounds normal; no masses,  no organomegaly  Extremities: extremities normal, atraumatic, no cyanosis or edema  Skin: diffuse jaundice.        Data ReviewCBC:       Recent Results (from the past 24 hour(s))   Urine electrolytes    Collection Time: 05/02/21 12:46 PM   Result Value Ref Range    Sodium, Ur <20 Not Established mmol/L    Potassium, Ur 58.8 Not Established mmol/L    Chloride 23 Not Established mmol/L   UREA NITROGEN, URINE    Collection Time: 05/02/21 12:46 PM   Result Value Ref Range    Urea Nitrogen, Ur 1281 800 - 1666 mg/dL   Creatinine, urine, random    Collection Time: 05/02/21 12:46 PM   Result Value Ref Range    Creatinine, Ur 190 40 - 278 mg/dL   Osmolality, urine    Collection Time: 05/02/21 12:46 PM   Result Value Ref Range    Osmolality, Ur 800 300 - 900 mOsm/kg   Comprehensive metabolic panel    Collection Time: 05/03/21  5:00 AM   Result Value Ref Range    Sodium 134 132 - 146 mmol/L    Potassium 5.0 3.5 - 5.0 mmol/L    Chloride 103 98 - 107 mmol/L    CO2 23 22 - 29 mmol/L    Anion Gap 8 7 - 16 mmol/L    Glucose 111 (H) 74 - 99 mg/dL    BUN 26 (H) 6 - 23 mg/dL    CREATININE 0.8 0.7 - 1.2 mg/dL    GFR Non-African American >60 >=60 mL/min/1.73    GFR African American >60     Calcium 8.9 8.6 - 10.2 mg/dL    Total Protein 5.5 (L) 6.4 - 8.3 g/dL    Albumin 3.0 (L) 3.5 - 5.2 g/dL Total Bilirubin 3.2 (H) 0.0 - 1.2 mg/dL    Alkaline Phosphatase 72 40 - 129 U/L    ALT 18 0 - 40 U/L    AST 46 (H) 0 - 39 U/L           Assessment:     Active Problems:    Electrolyte imbalance    Ascites    Hyperbilirubinemia    Hepatocellular carcinoma (HCC)    [facility] ([facility]  Resolved Problems:    * No resolved hospital problems. *      Plan:      pt declining. Will speak with family regarding Hospice. Supportive care for now.

## 2021-05-03 NOTE — PROGRESS NOTES
Nephrology Progress Note      Events Reviewed. Patient's Name: Vi Azevedo  3:07 PM  5/3/2021    Subjective: We are following Mr. Jemal Akhtar for LETA. Again, not responding to any questions. Extremely lethargic.     Current Medications:    LORazepam (ATIVAN) injection 0.5 mg, Q4H PRN  HYDROmorphone (DILAUDID) injection 0.5 mg, Q4H PRN  0.9 % sodium chloride infusion, Continuous  heparin flush 100 UNIT/ML injection 300 Units, BID  heparin flush 100 UNIT/ML injection 100 Units, PRN  scopolamine (TRANSDERM-SCOP) transdermal patch 1 patch, Q72H  ondansetron (ZOFRAN) injection 4 mg, Q6H PRN  acetaminophen (TYLENOL) tablet 500 mg, Q6H PRN  lactulose (CHRONULAC) 10 GM/15ML solution 20 g, BID  mirtazapine (REMERON) tablet 15 mg, Nightly  pantoprazole (PROTONIX) tablet 40 mg, QAM AC  sennosides-docusate sodium (SENOKOT-S) 8.6-50 MG tablet 2 tablet, Daily PRN  sodium chloride tablet 1 g, BID WC  sodium chloride flush 0.9 % injection 5-40 mL, BID  sodium chloride flush 0.9 % injection 5-40 mL, PRN  traMADol (ULTRAM) tablet 50 mg, Q6H PRN      Physical exam:   Constitutional:    Vitals: BP (!) 80/55   Pulse 108   Temp 98.4 °F (36.9 °C) (Oral)   Resp 16   Ht 6' (1.829 m)   Wt 134 lb 6.4 oz (61 kg)   SpO2 99%   BMI 18.23 kg/m²      Cachectic looking male in mild distress  Skin: no rash, turgor wnl  Heent:  eomi, mmm  Neck: no bruits or jvd noted  Cardiovascular:  S1, S2 without m/r/g  Respiratory: CTA B without w/r/r  Abdomen:  +bs, soft, nt, distended  Ext: no lower extremity edema  Psychiatric: mood and affect appropriate  Musculoskeletal:  Rom, muscular strength intact    Data:   Labs:  CBC:   Lab Results   Component Value Date    WBC 11.1 04/27/2021    RBC 4.50 04/27/2021    RBC  09/01/2016      RBC           [id number]    transfused   09/02/16  00:19  SCC    RBC  09/01/2016      RBC           [id number]    transfused   09/02/16  19:47  SCC    HGB 10.9 04/27/2021    HCT 37.5 04/27/2021    MCV 83.3 04/27/2021    MCH 24.2 04/27/2021    MCHC 29.1 04/27/2021    RDW 21.1 04/27/2021     04/27/2021    MPV 10.6 04/27/2021     CMP:    Lab Results   Component Value Date     05/03/2021    K 5.0 05/03/2021    K 4.0 04/15/2021     05/03/2021    CO2 23 05/03/2021    BUN 26 05/03/2021    CREATININE 0.8 05/03/2021    GFRAA >60 05/03/2021    LABGLOM >60 05/03/2021    GLUCOSE 111 05/03/2021    PROT 5.5 05/03/2021    LABALBU 3.0 05/03/2021    CALCIUM 8.9 05/03/2021    BILITOT 3.2 05/03/2021    ALKPHOS 72 05/03/2021    AST 46 05/03/2021    ALT 18 05/03/2021     BMP:    Lab Results   Component Value Date     05/03/2021    K 5.0 05/03/2021    K 4.0 04/15/2021     05/03/2021    CO2 23 05/03/2021    BUN 26 05/03/2021    LABALBU 3.0 05/03/2021    CREATININE 0.8 05/03/2021    CALCIUM 8.9 05/03/2021    GFRAA >60 05/03/2021    LABGLOM >60 05/03/2021    GLUCOSE 111 05/03/2021     Calcium:    Lab Results   Component Value Date    CALCIUM 8.9 05/03/2021     Phosphorus:    Lab Results   Component Value Date    PHOS 3.5 10/18/2016     Uric Acid:  No results found for: URICACID  U/A:  No results found for: NITRU, COLORU, PHUR, LABCAST, WBCUA, RBCUA, MUCUS, TRICHOMONAS, YEAST, BACTERIA, CLARITYU, SPECGRAV, LEUKOCYTESUR, UROBILINOGEN, BILIRUBINUR, BLOODU, GLUCOSEU, KETUA, AMORPHOUS     Imaging:  Ct Abdomen Pelvis W Iv Contrast Additional Contrast? None    Result Date: 4/15/2021  EXAMINATION: CT OF THE ABDOMEN AND PELVIS WITH CONTRAST 4/15/2021 6:07 pm TECHNIQUE: CT of the abdomen and pelvis was performed with the administration of intravenous contrast. Multiplanar reformatted images are provided for review. Dose modulation, iterative reconstruction, and/or weight based adjustment of the mA/kV was utilized to reduce the radiation dose to as low as reasonably achievable. COMPARISON: 02/01/2021.  HISTORY: ORDERING SYSTEM PROVIDED HISTORY: IR paracentesis drain placed in the left abdomen, history of cirrhosis TECHNOLOGIST PROVIDED HISTORY: Additional Contrast?->None Reason for exam:->IR paracentesis drain placed in the left abdomen, history of cirrhosis Decision Support Exception->Emergency Medical Condition (MA) FINDINGS: The lung bases demonstrate extensive/ innumerable pulmonary nodules and masses with the largest 1 in the left lower lobe measuring 4.2 x 3 cm and the largest 1 in the right lower lobe measuring 3.2 x 2.6 cm with significant progression in the disease. There is atelectasis and pleural effusion in the left lower lobe. Liver is cirrhotic with nodular border. A previously noted necrotic mass in the right hepatic lobe currently measures 8.3 x 3.3 cm which is somewhat increased in size. There may be tumor invasion with occlusion of the right portal vein which is thrombosed. There is large amount of ascites. Gallbladder is partially distended with gallstones and wall thickening. There is splenomegaly with spleen measuring 19 cm. There is prominence of the portal vein concerning for portal venous hypertension with venous varices at the GE junction and adair of the liver as well as splenic hilum. Pancreas, the adrenals and the kidneys are normal.  There is extensive metastatic nodules in the mesentery with nodules and masses throughout the abdomen pelvis with largest 1 in the abdomen measuring up to 5.5 x 4.1 cm. A large necrotic mass in the pelvis in the rectovesical space measuring 10 x 8 cm with mass effect on the bladder is noted. There is nodularity of the omentum concerning for omental caking/peritoneal carcinomatosis. Subcutaneous mass in the anterior abdominal wall measuring 2.3 x 2.8 cm is noted. Some small retroperitoneal adenopathy is also noted. There is interval placement of a I had catheter in the pelvis with small amount of pneumoperitoneum with air anteriorly likely resultant from the procedure. Pelvis.   Bladder is partially distended with the nodularity associated with the bladder wall, probably metastasis to the bladder wall. The previously described the necrotic mass in the pelvis/rectovesical space is noted. Malignant ascites is noted. There is constipation. Appendix is normal.     Interval placement of a drainage catheter in the left lower quadrant/pelvis with small amount of pneumoperitoneum likely related to procedure. Hepatic cirrhosis with splenomegaly and portal venous hypertension with venous varices and large amount of ascites. Progressive widespread metastatic malignancy with progressive widespread pulmonary metastasis, progressive necrotic mass lesion in the right hepatic lobe and peritoneal carcinomatosis with the extensive mesenteric and pelvic necrotic masses and omental caking. There may be malignant ascites. There may be tumor invasion into the right portal vein. Partially distended gallbladder with gallstones and wall thickening which could be either due to cholecystitis versus ascites. Ir Guided Tunneled Intraperitoneal Cath W Or Wo Contrast Perc    Result Date: 2021  NUMBER OF IMAGES: Patient MRN:  [id number] : 1955 Age: 77 years Gender: Male Order Date:  2021 11:37 AM EXAM: IR GUIDED TUNNELED INTRAPERITONEAL CATH W OR WO CONTRAST PERC NUMBER OF IMAGES:  4 INDICATION: C22.0 Hepatocellular carcinoma ([address] placement of plexus catheter for recurrent ascites What reading provider will be dictating this exam?->[facility] COMPARISON: None Insertion of a permanent peritoneal drainage catheter with tunnel creation: After obtaining informed consent and following the routine sterile prep and drape a combination of ultrasound and fluoroscopy were utilized to place a needle into the peritoneal cavity and ascitic fluid was obtained. Subsequently after the administration of local anesthesia a tunnel tract was created and dilated. Two separate incisions were made. Routine ultrasound and Doppler ultrasound were used.  Using direct real-time ultrasound imaging peritoneal access was obtained. An image was stored and copy was transmitted to PACS. Subsequently with real-time guidance a needle was inserted intravascular and through the needle a wire. The needle tip was visualized with real time guidance as it was placed into the peritoneal space. A tunneled catheter was subsequently advanced from the first incision into the second incision and subsequently a catheter was advanced into the peritoneal space. Confirmation of position was confirmed under fluoroscopic control. Time out occurred at 1148 hours. Patient received 1 mg of Versed, 50 mcg of fentanyl and local anesthesia and was monitored for 30 minutes or less by a registered nurse. Patient received 12 seconds fluoroscopy. Subsequently ascitic fluid was drained. The patient tolerated the procedure well. The skin was sutured with 3-0 nylon. The drain was sutured in place. The cuff was placed appropriately. Successful uncomplicated placement of a Pleurx catheter for a permanent peritoneal catheter drainage. Assessment  Chrissy Nicholas is a 77 y.o. male with past medical history of metastatic hepatocellular carcinoma with peritoneal carcinomatosis, recurrent ascites needing paracentesis, recently had an abdominal catheter placed for drainage at home, presented with chief complaints of N/V and weakness. I recently have seen him for hyponatremia and he was discharged on 4/20/21 with a BUN of 17, cr of 0.5, Na of 127, I advised him to take salt tabs 1 gm PO BID for 1 week after discharge along with a strict 1 L fluid restriction and a general diet with salt and protein intake. Labs from the ER showed a BUN of 41, cr of 1.3, K of 5.6 along with hypotension      1. Acute Kidney Injury secondary to severe pre renal azotemia from volume depletion and hypotension- now resolved. Renal function stable. 2. Hyperkalemia, will start on low potassium diet  3.  Chronic hyponatremia secondary to hypovolemia, poor solute intake and excess free water intake- sodium level 130 today, to continue on fluid restriction. 4. Hypoalbuminemia- secondary to cancer and poor nutrition    Plan    · Continue sodium chloride tablets 1 gm PO BID  · Low potassium diet   · Continue to monitor kidney function   · Continue to monitor sodium levels  · Monitor potassium level  · 1.2 L fluid restriction   · Monitor BP  · Prognosis is poor. Family is thinking about hospice.

## 2021-05-03 NOTE — PLAN OF CARE
Problem: Falls - Risk of:  Goal: Will remain free from falls  Description: Will remain free from falls  Outcome: Met This Shift  Goal: Absence of physical injury  Description: Absence of physical injury  Outcome: Met This Shift     Problem: Skin Integrity:  Goal: Will show no infection signs and symptoms  Description: Will show no infection signs and symptoms  Outcome: Met This Shift  Goal: Absence of new skin breakdown  Description: Absence of new skin breakdown  Outcome: Met This Shift     Problem: Pain:  Goal: Pain level will decrease  Description: Pain level will decrease  Outcome: Met This Shift  Goal: Control of acute pain  Description: Control of acute pain  Outcome: Met This Shift  Goal: Control of chronic pain  Description: Control of chronic pain  Outcome: Met This Shift     Problem: Confusion - Acute:  Goal: Absence of continued neurological deterioration signs and symptoms  Description: Absence of continued neurological deterioration signs and symptoms  Outcome: Ongoing  Goal: Mental status will be restored to baseline  Description: Mental status will be restored to baseline  Outcome: Ongoing     Problem: Discharge Planning:  Goal: Ability to perform activities of daily living will improve  Description: Ability to perform activities of daily living will improve  Outcome: Ongoing  Goal: Participates in care planning  Description: Participates in care planning  Outcome: Ongoing     Problem: Injury - Risk of, Physical Injury:  Goal: Will remain free from falls  Description: Will remain free from falls  Outcome: Met This Shift  Goal: Absence of physical injury  Description: Absence of physical injury  Outcome: Met This Shift     Problem: Mood - Altered:  Goal: Mood stable  Description: Mood stable  Outcome: Met This Shift  Goal: Absence of abusive behavior  Description: Absence of abusive behavior  Outcome: Met This Shift  Goal: Verbalizations of feeling emotionally comfortable while being cared for will

## 2021-05-03 NOTE — PROGRESS NOTES
[facility] [place]     Liaison Information Visit Note              Patient Name: Josse Thomson   :  1955  MRN:  [id number]  [address] [facility] date:  2021   Hospital Admitting Physician:  Janna Lockett DO   PCP:  Janna Lockett DO  Primary Insurance: Payor: Demi Gant /  /  /    Emergency Contact:      Contact/Relation:   /         Phone:     Advance Directive  Patient has NOT completed an advance directive  and Patient does not have a documented healthcare surrogate  Discussed with: Family member  DPOA-HC Name-Relation:    Phone:     Terminal Diagnosis Hepatocellular cancer with mets as confirmed by Dr. Crista Craft Problem List:   Patient Active Problem List   Diagnosis Code    Hematemesis with nausea K92.0    Acute blood loss anemia D62    Alcohol abuse F10.10    Gastrointestinal hemorrhage with hematemesis K92.0    Chronic superficial gastritis without bleeding K29.30    Secondary esophageal varices with bleeding (HCC) I85.11    GI bleed K92.2    Esophageal varices ([facility] [address] I85.00    H/O esophageal varices Z87.19    Hepatocellular carcinoma ([address] C22.0    Postoperative observation Z48.89    Pancytopenia ([address] D61.818    Electrolyte imbalance E87.8    Constipation K59.00    Ascites R18.8    Hyperbilirubinemia E80.6    Hyperammonemia (HCC) E72.20    LETA (acute kidney injury) ([facility] [address] N17.9       Code Status Order: DNR-CCA     Allergies:  Patient has no known allergies. Family Goal: Comfort Care     Meeting held with Son's fatoumata and memo    The hospice benefit and philosophy were explained including that hospice is end of life care in which, per Medicare, a patient has a terminal diagnosis that life expectancy would be 6 months or less. Hospice care is a service that is covered by most insurance plans.   The following levels of hospice care were discussed including, routine level of hospice care at private home or facility, which patient/family is responsible for any room and board fees at the facility, and general in patient level of care (GIP) at the [facility] for short term symptom management. Per Medicare guidelines, a patient is considered appropriate for GIP if they have uncontrolled symptoms such as pain, agitation, labored breathing or nausea/vomiting. Once symptoms become managed, the patient would need to be moved to a lower level of care such as home with hospice, ECF with hospice or the Transition program.  [facility] transition program also explained which is routine hospice care provided at the [facility] instead of an ECF or home. The transition program is private pay $300/day for room/board. Room/board for the transition program is not covered by Medicaid as would be in an ECF. Family informed that with the routine level of care at home or ECF,  the hospice team consists of the RN who visits 1-3 times a week, a  who visits within the first five days of the hospice election, the personal care team who visit 1-3 times a week, non-medical volunteers and Chaplains. Explained that at home in routine level of care, familles are responsible for the 24 hour care. Discussed that under hospice care patient would not receive chemotherapy, radiation, immune therapy, IV antibiotics, dialysis or blood transfusions. Explained that once in hospice care, all aggressive treatments would be stopped and allow nature to takes its course with focus on comfort care for the patient. Met with Priscilla Chaudhary outside of patient's room. Priscilla Chaudhary would like hospice services. They are leaning towards taking patient home with hospice over an ECF. They are going to reach out to their employers regarding FMLA and discuss together different options. They would like a follow-up tomorrow with hospice. Patient, Stefanie Segundo complaining of abdominal pain. Labored breathing noticed as well as some twitching. Requested bedside nurse medicate.  Spoke with palliative who adjusted medications and added IV option due to patient's increasing lethargy. Discharge Plan:  Discharge Disposition; undecided    [facility] plan:  1. Follow-up tomorrow. 2. Please call [facility] [phone] with any questions. 3. Patient not currently under the care of hospice.     Electronically signed by Jeremie Kumar RN on 5/3/2021 at 12:41 PM

## 2021-05-03 NOTE — PROGRESS NOTES
Notified Dr. Yajaira Manzano of MEWS score of 4 and low BP. Awaiting reply. Per Dr. Yajaira Manzano; start NS @ 50 mL/hr IV.  -Electronically signed by Loree Bonilla RN on 5/2/2021 at 10:55 PM

## 2021-05-03 NOTE — CARE COORDINATION
Social Work / Discharge Planning : MATILDE spoke to patient son Sonya Ochoa and they do want to speak to Leilani Gómez. Tara updated and she did speak with patient and his two sons. Plan at discharge is HOME with Leilani Gómez. The son is going to his employer today for FANI to care for patient at home with JAYA. MATILDE to follow.  Electronically signed by MELISSA Oliver on 5/3/21 at 11:57 AM EDT

## 2021-05-03 NOTE — PLAN OF CARE
Problem: Falls - Risk of:  Goal: Will remain free from falls  Description: Will remain free from falls  Outcome: Met This Shift  Goal: Absence of physical injury  Description: Absence of physical injury  Outcome: Met This Shift     Problem: Skin Integrity:  Goal: Will show no infection signs and symptoms  Description: Will show no infection signs and symptoms  Outcome: Met This Shift  Goal: Absence of new skin breakdown  Description: Absence of new skin breakdown  Outcome: Met This Shift     Problem: Pain:  Description: Pain management should include both nonpharmacologic and pharmacologic interventions.   Goal: Pain level will decrease  Description: Pain level will decrease  Outcome: Met This Shift  Goal: Control of acute pain  Description: Control of acute pain  Outcome: Met This Shift  Goal: Control of chronic pain  Description: Control of chronic pain  Outcome: Met This Shift     Problem: Mobility - Impaired  Goal: Mobility level is maintained or improved  Outcome: Met This Shift     Problem: Injury - Risk of, Physical Injury:  Goal: Will remain free from falls  Description: Will remain free from falls  Outcome: Met This Shift  Goal: Absence of physical injury  Description: Absence of physical injury  Outcome: Met This Shift     Problem: Mood - Altered:  Goal: Mood stable  Description: Mood stable  Outcome: Met This Shift  Goal: Absence of abusive behavior  Description: Absence of abusive behavior  Outcome: Met This Shift

## 2021-05-03 NOTE — PROGRESS NOTES
Physical Therapy    Facility/Department: [address] MED SURG/TELE  Treatment Note  NAME: Calin Wu  : 1955  MRN: [id number]    Date of Service: 5/3/2021      Referring Provider:  Edyta Clark DO    Evaluating Therapist: TONY RUVALCABA FOR PSYCHIATRY PT    Room #:533  DIAGNOSIS: Acute Kidney Injury  Additional Pertinent History: Cirrhosis, Hepatocellular carcinoma, pleurex catheter  PRECAUTIONS: falls, alarm    Social:  Pt lives alone in a 2 floor plan . Independent without device. Initial Evaluation  Date: 21 Treatment  5/3/21    Short Term/ Long Term   Goals   Was pt agreeable to Eval/treatment? yes Yes, was encouraged by his visitor to go back to bed    Does pt have pain? L LE No c/o pain or apparent pain    Bed Mobility  Rolling: min assist  Supine to sit: min assist  Sit to supine: min assist  Scooting: min assist Rolling: NT  Supine to sit: NT  Sit to supine: MOD A  Scooting: NT SBA   Transfers Sit to stand: mod assist  Stand to sit: mod assist  Stand pivot: NT Sit to stand: MOD A  Stand to sit: MOD A  Stand pivot: MOD A with ww CGA   Ambulation    5 feet with ww with min assist 3 small steps during pivot transfer MOD A using WW for support 150 feet with ww with CGA   Stair Negotiation  Ascended and descended  NT NA  4-12 steps with 1 rail with CGA   AM-PAC Raw score               15/24 14/24        Pt is alert and able to follow instruction  Balance: poor during pivot transfer using [facility] for support    Pt performed therapeutic exercise of the following: NT    Patient education  Pt was educated on UE usage to assist with transfer    Patient response to education:   Pt verbalized understanding Pt demonstrated skill Pt requires further education in this area   yes With prompt for transfer safety yes     ASSESSMENT:   Comments: Nurse ok with rx. Pt found in a recliner chair, Visitor present, states Pt has been OOB since 8:00 AM and ready to go back to bed.  Pt able to WB and advance LEs during pivot chair to bed but displays poor balance, remains unsafe to transfer alone. Pt then assisted to bed per request. Pt fatigued after activity. Treatment: Patient practiced and was instructed in the following treatment: transfer safety     Pt was left in bed with call light in reach    Chair/bed alarm: bed alarm active    Time in 1301   Time out 1311   Total Treatment Time 10 minutes   CPT codes:     Therapeutic activities 80206 10 minutes   Therapeutic exercises 80560 0 minutes       Pt is making fair progress toward established Physical Therapy goals as per brief functional mobility performed. Continue with physical therapy current plan of care.     Stacy Montalvo PTA   License Number: [id number]

## 2021-05-04 NOTE — PROGRESS NOTES
5/4/2021  1:45 PM    Nutrition Note:  Continue to provide comfort care measures. Pt status declining and plan for discharge to [facility] later. Noted and dietitian available per consult.     Electronically signed by Olinda Limon RD, CNSC, LD on 5/4/21 at 1:46 PM EDT  [phone]

## 2021-05-04 NOTE — PROGRESS NOTES
Admit Date: 4/27/2021  Hospital day 7    Subjective:     Patient resting in bed in NAD. More alert today. Denies CP, SOB, HA, N/V, diarrhea. Does c/o mild abd pain. Objective:       I/O last 3 completed shifts:  In: -   Out: 900 [Drains:900]      BP (!) 100/59   Pulse 101   Temp 98.4 °F (36.9 °C) (Oral)   Resp 17   Ht 6' (1.829 m)   Wt 134 lb 4 oz (60.9 kg)   SpO2 98%   BMI 18.21 kg/m²   General appearance: alert, appears stated age, cachectic, cooperative, fatigued and icteric  Lungs: clear to auscultation bilaterally  Heart: regular rate and rhythm, S1, S2 normal, no murmur, click, rub or gallop  Abdomen: soft, non-tender; bowel sounds normal; no masses,  no organomegaly  Extremities: extremities normal, atraumatic, no cyanosis or edema  Skin: diffuse jaundice. Data ReviewCBC:       Recent Results (from the past 24 hour(s))   Comprehensive metabolic panel    Collection Time: 05/04/21  4:00 AM   Result Value Ref Range    Sodium 131 (L) 132 - 146 mmol/L    Potassium 5.5 (H) 3.5 - 5.0 mmol/L    Chloride 101 98 - 107 mmol/L    CO2 22 22 - 29 mmol/L    Anion Gap 8 7 - 16 mmol/L    Glucose 97 74 - 99 mg/dL    BUN 31 (H) 6 - 23 mg/dL    CREATININE 0.8 0.7 - 1.2 mg/dL    GFR Non-African American >60 >=60 mL/min/1.73    GFR African American >60     Calcium 8.8 8.6 - 10.2 mg/dL    Total Protein 5.3 (L) 6.4 - 8.3 g/dL    Albumin 2.7 (L) 3.5 - 5.2 g/dL    Total Bilirubin 3.6 (H) 0.0 - 1.2 mg/dL    Alkaline Phosphatase 70 40 - 129 U/L    ALT 16 0 - 40 U/L    AST 42 (H) 0 - 39 U/L           Assessment:     Active Problems:    Electrolyte imbalance    Ascites    Hyperbilirubinemia    Hepatocellular carcinoma (HCC)    LETA (acute kidney injury) ([address]  Resolved Problems:    * No resolved hospital problems. *      Plan:      Pt declining. Inc fluids. Add Bentyl for abd cramping. Family and pt still considering Hospice. Supportive care for now. Renal and oncology recs appreciated.

## 2021-05-04 NOTE — PROGRESS NOTES
Tele pack cleaned and returned to closet, discharge instructions sent with patient to hospice house.  Port heparin locked, per hospice request.

## 2021-05-04 NOTE — PROGRESS NOTES
Nephrology Progress Note      Events Reviewed. Patient's Name: Elenita Ramos  12:05 PM  5/4/2021    Subjective: We are following Mr. Yanci Kidd for [facility]. He is very tired and lethargic although able to answer some questions. Complaining of pain. Code status changed to DNR CC. He is going to the hospice house this afternoon.        Current Medications:    dicyclomine (BENTYL) injection 20 mg, 4x Daily  LORazepam (ATIVAN) injection 0.5 mg, Q4H PRN  HYDROmorphone (DILAUDID) injection 0.5 mg, Q4H PRN  0.9 % sodium chloride infusion, Continuous  heparin flush 100 UNIT/ML injection 300 Units, BID  heparin flush 100 UNIT/ML injection 100 Units, PRN  scopolamine (TRANSDERM-SCOP) transdermal patch 1 patch, Q72H  ondansetron (ZOFRAN) injection 4 mg, Q6H PRN  acetaminophen (TYLENOL) tablet 500 mg, Q6H PRN  lactulose (CHRONULAC) 10 GM/15ML solution 20 g, BID  mirtazapine (REMERON) tablet 15 mg, Nightly  pantoprazole (PROTONIX) tablet 40 mg, QAM AC  sennosides-docusate sodium (SENOKOT-S) 8.6-50 MG tablet 2 tablet, Daily PRN  sodium chloride tablet 1 g, BID WC  sodium chloride flush 0.9 % injection 5-40 mL, BID  sodium chloride flush 0.9 % injection 5-40 mL, PRN  traMADol (ULTRAM) tablet 50 mg, Q6H PRN      Physical exam:   Constitutional:    Vitals: BP (!) 102/59   Pulse 113   Temp 98.4 °F (36.9 °C) (Axillary)   Resp 16   Ht 6' (1.829 m)   Wt 134 lb 4 oz (60.9 kg)   SpO2 100%   BMI 18.21 kg/m²      Cachectic looking male in mild distress  Skin: no rash, turgor wnl  Heent:  eomi, mmm  Neck: no bruits or jvd noted  Cardiovascular:  S1, S2 without m/r/g  Respiratory: CTA B without w/r/r  Abdomen:  +bs, soft, nt, distended  Ext: no lower extremity edema  Psychiatric: mood and affect appropriate  Musculoskeletal:  Rom, muscular strength intact    Data:   Labs:  CBC:   Lab Results   Component Value Date    WBC 11.1 04/27/2021    RBC 4.50 04/27/2021    RBC  09/01/2016      RBC           [id number]    transfused 09/02/16  00:19  SCC    RBC  09/01/2016      RBC           [id number]    transfused   09/02/16  19:47  S[facility]    HGB 10.9 04/27/2021    HCT 37.5 04/27/2021    MCV 83.3 04/27/2021    MCH 24.2 04/27/2021    MCHC 29.1 04/27/2021    RDW 21.1 04/27/2021     04/27/2021    MPV 10.6 04/27/2021     CMP:    Lab Results   Component Value Date     05/04/2021    K 5.5 05/04/2021    K 4.0 04/15/2021     05/04/2021    CO2 22 05/04/2021    BUN 31 05/04/2021    CREATININE 0.8 05/04/2021    GFRAA >60 05/04/2021    LABGLOM >60 05/04/2021    GLUCOSE 97 05/04/2021    PROT 5.3 05/04/2021    LABALBU 2.7 05/04/2021    CALCIUM 8.8 05/04/2021    BILITOT 3.6 05/04/2021    ALKPHOS 70 05/04/2021    AST 42 05/04/2021    ALT 16 05/04/2021     BMP:    Lab Results   Component Value Date     05/04/2021    K 5.5 05/04/2021    K 4.0 04/15/2021     05/04/2021    CO2 22 05/04/2021    BUN 31 05/04/2021    LABALBU 2.7 05/04/2021    CREATININE 0.8 05/04/2021    CALCIUM 8.8 05/04/2021    GFRAA >60 05/04/2021    LABGLOM >60 05/04/2021    GLUCOSE 97 05/04/2021     Calcium:    Lab Results   Component Value Date    CALCIUM 8.8 05/04/2021     Phosphorus:    Lab Results   Component Value Date    PHOS 3.5 10/18/2016     Uric Acid:  No results found for: URICACID  U/A:  No results found for: NITRU, COLORU, PHUR, LABCAST, WBCUA, RBCUA, MUCUS, TRICHOMONAS, YEAST, BACTERIA, CLARITYU, SPECGRAV, LEUKOCYTESUR, UROBILINOGEN, BILIRUBINUR, BLOODU, GLUCOSEU, KETUA, AMORPHOUS     Imaging:  Ct Abdomen Pelvis W Iv Contrast Additional Contrast? None    Result Date: 4/15/2021  EXAMINATION: CT OF THE ABDOMEN AND PELVIS WITH CONTRAST 4/15/2021 6:07 pm TECHNIQUE: CT of the abdomen and pelvis was performed with the administration of intravenous contrast. Multiplanar reformatted images are provided for review.  Dose modulation, iterative reconstruction, and/or weight based adjustment of the mA/kV was utilized to reduce the radiation dose to as low as reasonably achievable. COMPARISON: 02/01/2021. HISTORY: ORDERING SYSTEM PROVIDED HISTORY: IR paracentesis drain placed in the left abdomen, history of cirrhosis TECHNOLOGIST PROVIDED HISTORY: Additional Contrast?->None Reason for exam:->IR paracentesis drain placed in the left abdomen, history of cirrhosis Decision Support Exception->Emergency Medical Condition (MA) FINDINGS: The lung bases demonstrate extensive/ innumerable pulmonary nodules and masses with the largest 1 in the left lower lobe measuring 4.2 x 3 cm and the largest 1 in the right lower lobe measuring 3.2 x 2.6 cm with significant progression in the disease. There is atelectasis and pleural effusion in the left lower lobe. Liver is cirrhotic with nodular border. A previously noted necrotic mass in the right hepatic lobe currently measures 8.3 x 3.3 cm which is somewhat increased in size. There may be tumor invasion with occlusion of the right portal vein which is thrombosed. There is large amount of ascites. Gallbladder is partially distended with gallstones and wall thickening. There is splenomegaly with spleen measuring 19 cm. There is prominence of the portal vein concerning for portal venous hypertension with venous varices at the GE junction and adair of the liver as well as splenic hilum. Pancreas, the adrenals and the kidneys are normal.  There is extensive metastatic nodules in the mesentery with nodules and masses throughout the abdomen pelvis with largest 1 in the abdomen measuring up to 5.5 x 4.1 cm. A large necrotic mass in the pelvis in the rectovesical space measuring 10 x 8 cm with mass effect on the bladder is noted. There is nodularity of the omentum concerning for omental caking/peritoneal carcinomatosis. Subcutaneous mass in the anterior abdominal wall measuring 2.3 x 2.8 cm is noted. Some small retroperitoneal adenopathy is also noted.   There is interval placement of a I had catheter in the pelvis with small amount of pneumoperitoneum with air anteriorly likely resultant from the procedure. Pelvis. Bladder is partially distended with the nodularity associated with the bladder wall, probably metastasis to the bladder wall. The previously described the necrotic mass in the pelvis/rectovesical space is noted. Malignant ascites is noted. There is constipation. Appendix is normal.     Interval placement of a drainage catheter in the left lower quadrant/pelvis with small amount of pneumoperitoneum likely related to procedure. Hepatic cirrhosis with splenomegaly and portal venous hypertension with venous varices and large amount of ascites. Progressive widespread metastatic malignancy with progressive widespread pulmonary metastasis, progressive necrotic mass lesion in the right hepatic lobe and peritoneal carcinomatosis with the extensive mesenteric and pelvic necrotic masses and omental caking. There may be malignant ascites. There may be tumor invasion into the right portal vein. Partially distended gallbladder with gallstones and wall thickening which could be either due to cholecystitis versus ascites. Ir Guided Tunneled Intraperitoneal Cath W Or Wo Contrast Perc    Result Date: 2021  NUMBER OF IMAGES: Patient MRN:  [id number] : 1955 Age: 77 years Gender: Male Order Date:  2021 11:37 AM EXAM: IR GUIDED TUNNELED INTRAPERITONEAL CATH W OR WO CONTRAST PERC NUMBER OF IMAGES:  4 INDICATION: C22.0 Hepatocellular carcinoma ([address] placement of plexus catheter for recurrent ascites What reading provider will be dictating this exam?->[facility] COMPARISON: None Insertion of a permanent peritoneal drainage catheter with tunnel creation: After obtaining informed consent and following the routine sterile prep and drape a combination of ultrasound and fluoroscopy were utilized to place a needle into the peritoneal cavity and ascitic fluid was obtained.  Subsequently after the administration of local anesthesia a tunnel tract was created and dilated. Two separate incisions were made. Routine ultrasound and Doppler ultrasound were used. Using direct real-time ultrasound imaging peritoneal access was obtained. An image was stored and copy was transmitted to PACS. Subsequently with real-time guidance a needle was inserted intravascular and through the needle a wire. The needle tip was visualized with real time guidance as it was placed into the peritoneal space. A tunneled catheter was subsequently advanced from the first incision into the second incision and subsequently a catheter was advanced into the peritoneal space. Confirmation of position was confirmed under fluoroscopic control. Time out occurred at 1148 hours. Patient received 1 mg of Versed, 50 mcg of fentanyl and local anesthesia and was monitored for 30 minutes or less by a registered nurse. Patient received 12 seconds fluoroscopy. Subsequently ascitic fluid was drained. The patient tolerated the procedure well. The skin was sutured with 3-0 nylon. The drain was sutured in place. The cuff was placed appropriately. Successful uncomplicated placement of a Pleurx catheter for a permanent peritoneal catheter drainage. Assessment  Neda Vidal is a 77 y.o. male with past medical history of metastatic hepatocellular carcinoma with peritoneal carcinomatosis, recurrent ascites needing paracentesis, recently had an abdominal catheter placed for drainage at home, presented with chief complaints of N/V and weakness. I recently have seen him for hyponatremia and he was discharged on 4/20/21 with a BUN of 17, cr of 0.5, Na of 127, I advised him to take salt tabs 1 gm PO BID for 1 week after discharge along with a strict 1 L fluid restriction and a general diet with salt and protein intake. Labs from the ER showed a BUN of 41, cr of 1.3, K of 5.6 along with hypotension      1.  Acute Kidney Injury secondary to severe pre renal azotemia from volume depletion and hypotension- now resolved. Renal function stable. 2. Hyperkalemia, will start on low potassium diet  3. Chronic hyponatremia secondary to hypovolemia, poor solute intake and excess free water intake- sodium level 130 today, to continue on fluid restriction. 4. Hypoalbuminemia- secondary to cancer and poor nutrition    Plan    · Prognosis is poor. Plan is for patient to go to hospice house this afternoon.  Code status changed to DNR CC.

## 2021-05-04 NOTE — PROGRESS NOTES
Follow-up visit made to patient's room. Hattie Sesay is alert and able to answer some questions. He is tired and very lethargic. Often times has a blank stare. He is having labored breathing with the use of accessory muscles and continues to complain of abdominal pain. RR 26/min. Per nursing he is in more pain when his ascites is drained daily. He was given a dose of morphine yesterday. Nursing has been reluctant to medicate because his code status has not been changed to a DNR-CC and patient's blood pressure can be low at times. Message out to palliative care to change code status and requested they medicate patient. Spoke with Fatemeh Hugo who accepted patient for uncontrolled pain and dyspnea. Spoke with son Hattie Sesay. He is able to come sign consents for hospice at [phone]. Called hospice house and they would like patient to come at 1600 to room 107. Set up transportation with [facility] for 1600. Updated charge nurse.    Electronically signed by Harmony Lorenzana RN on 5/4/2021 at 10:43 AM

## 2021-05-05 NOTE — DISCHARGE SUMMARY
[id number] [address]                               DISCHARGE SUMMARY    PATIENT NAME: Rex Gillis                      :        1955  MED REC NO:   [id number]                            ROOM:       0533  ACCOUNT NO:   [de-identified]                           ADMIT DATE: 2021  PROVIDER:     Frida Anderson DO                  [facility] DATE: 2021    DATE OF ADMISSION:  2021. DATE OF DISCHARGE:  2021    ADMITTING DIAGNOSES:  1. Acute kidney injury. 2.  Electrolyte imbalance with hyperkalemia and hyponatremia. 3.  Hyperbilirubinemia. 4.  Hepatocellular carcinoma. 5.  Ascites. CONSULTANTS:  Renal was consulted regarding the patient's acute kidney  injury. She did feel that this was secondary to severe prerenal  azotemia with volume depletion resulting in hypotension. Noted  hyperkalemia secondary to acute kidney injury and acidosis and chronic  hyponatremia secondary to hypovolemia and poor solute intake with excess  free water intake. Plan was for aggressive IV hydration, monitoring of  laboratory studies, followed the patient throughout his stay. Blood  pressure stabilized as did his renal function and electrolytes however,  the patient's overall status remained very poor and ultimately no  further renal recommendations were given. Oncology was consulted,  however, it does not appear as though Oncology was able to see the  patient nor give any suggestions for therapeutic adjustments. The  patient was at this point end-stage, therefore no changes were expected  to be of any benefit. Palliative care was ultimately consulted with  recommendations for hospice management. Eventually family and the  patient were agreeable and therefore aJ Rodriguez was asked to see the  patient.     HISTORY AND PHYSICAL FINDINGS:  This 66-year-old male presented to [facility] Emergency Room with a recent onset of nausea,  vomiting, diarrhea, and increasing fatigue. He states that his  chemotherapeutic agent had been recently changed and this resulted in  increasing nausea and vomiting. While in the emergency room, laboratory  studies suggested acute kidney injury with elevated creatinine from a  baseline of 1.32 to 1.3 and a BUN of 41. Electrolyte abnormalities were  also noticed. Bilirubin elevated from baseline to 3.6 with a low  protein at 6.0. The patient was slightly anemic with hemoglobin of  10.9. The patient had already been known to have hepatocellular  carcinoma with widespread metastases and was recently seen in the  hospital for ascites and drainage from adjacent areas of PleurX  catheter. He was discharged stable condition, however, the above  symptoms began. The patient initially had decided on Hospice Care prior  to this admission; however, declined hospice upon return to the  emergency room and therefore he was admitted and evaluation and  treatment was initiated. HOSPITAL COURSE AND TREATMENT:  The patient's hospital course was  essentially remarkable only in the fact that the patient continued to  decline even after his volume status was stabilized. IV resuscitation  with fluids was accomplished. Blood pressure was for the most part  stable as were his electrolytes, however, his overall status remained  poor. He continued to appear more and more cachectic and lethargic with  each day. Attempts were made for adjusting chemotherapeutic agents  without success. I did speak with his sons on several occasions. They  were in agreement that Hospice would be the next logical choice and  ultimately after discussing with the patient, this was in fact their  wishes. On the date of discharge, Hospice House was consulted and  arrangements were made for transfer. CONDITION ON DISCHARGE:  Stable, however, with poor prognosis.     DISCHARGE INSTRUCTIONS:  Pain management and management of anxiety would  be at the discretion of Ja Rodriguez. I would be available for any  suggestions or prescriptions if necessary. PRIMARY DIAGNOSES:  1. Hepatocellular carcinoma with widespread metastases. 2.  Ascites secondary to hepatocellular carcinoma with widespread  metastases. 3.  Acute kidney injury. 4.  Electrolyte imbalance. 5.  Hyperbilirubinemia. Total time spent with the patient including discharge, discharge  summary, and instructions greater than or equal to half hour.         Michael Penn DO    D: 05/04/2021 12:20:43       T: 05/04/2021 12:28:37     [email or web address]  Job#: [id number]     Doc#: [id number]    CC:

## 2023-11-08 NOTE — DISCHARGE INSTR - DIET

## 2025-05-17 NOTE — DISCHARGE INSTR - DIET

## (undated) DEVICE — BLOCK BITE 60FR RUBBER ADLT DENTAL

## (undated) DEVICE — GRADUATE TRIANG MEASURE 1000ML BLK PRNT

## (undated) DEVICE — TEN SHOOTER SAEED MULTI-BAND LIGATOR: Brand: SAEED

## (undated) DEVICE — CLIPPING DEVICE: Brand: RESOLUTION CLIP

## (undated) DEVICE — SPONGE GZ W4XL4IN RAYON POLY FILL CVR W/ NONWOVEN FAB